# Patient Record
Sex: FEMALE | Employment: OTHER | URBAN - METROPOLITAN AREA
[De-identification: names, ages, dates, MRNs, and addresses within clinical notes are randomized per-mention and may not be internally consistent; named-entity substitution may affect disease eponyms.]

---

## 2018-01-13 ENCOUNTER — HOSPITAL ENCOUNTER (EMERGENCY)
Age: 58
Discharge: HOME | End: 2018-01-13
Attending: EMERGENCY MEDICINE
Payer: SELF-PAY

## 2018-01-13 VITALS
WEIGHT: 293 LBS | RESPIRATION RATE: 17 BRPM | OXYGEN SATURATION: 97 % | SYSTOLIC BLOOD PRESSURE: 141 MMHG | DIASTOLIC BLOOD PRESSURE: 82 MMHG | TEMPERATURE: 98.5 F | HEIGHT: 69 IN | BODY MASS INDEX: 43.4 KG/M2 | HEART RATE: 93 BPM

## 2018-01-13 DIAGNOSIS — J20.9 ACUTE BRONCHITIS, UNSPECIFIED ORGANISM: Primary | ICD-10-CM

## 2018-01-13 LAB
FLUAV AG NPH QL IA: NEGATIVE
FLUBV AG NPH QL IA: NEGATIVE

## 2018-01-13 PROCEDURE — 87804 INFLUENZA ASSAY W/OPTIC: CPT | Performed by: EMERGENCY MEDICINE

## 2018-01-13 PROCEDURE — 99282 EMERGENCY DEPT VISIT SF MDM: CPT | Performed by: EMERGENCY MEDICINE

## 2018-01-13 RX ORDER — AZITHROMYCIN 250 MG/1
TABLET, FILM COATED ORAL
Qty: 6 TAB | Refills: 0 | Status: SHIPPED | OUTPATIENT
Start: 2018-01-13 | End: 2018-01-18

## 2018-01-13 RX ORDER — BENZONATATE 100 MG/1
100 CAPSULE ORAL
Qty: 21 CAP | Refills: 0 | Status: SHIPPED | OUTPATIENT
Start: 2018-01-13 | End: 2018-01-20

## 2018-01-13 NOTE — ED NOTES
I have reviewed discharge instructions with the patient. The patient verbalized understanding. Patient left ED via Discharge Method: ambulatory to Home with (self). Opportunity for questions and clarification provided. Patient given 2 scripts. To continue your aftercare when you leave the hospital, you may receive an automated call from our care team to check in on how you are doing. This is a free service and part of our promise to provide the best care and service to meet your aftercare needs.  If you have questions, or wish to unsubscribe from this service please call [phone]. Thank you for Choosing our [facility] Emergency Department.

## 2018-01-13 NOTE — DISCHARGE INSTRUCTIONS
Bronchitis: Care Instructions  Your Care Instructions    Bronchitis is inflammation of the bronchial tubes, which carry air to the lungs. The tubes swell and produce mucus, or phlegm. The mucus and inflamed bronchial tubes make you cough. You may have trouble breathing. Most cases of bronchitis are caused by viruses like those that cause colds. Antibiotics usually do not help and they may be harmful. Bronchitis usually develops rapidly and lasts about 2 to 3 weeks in otherwise healthy people. Follow-up care is a key part of your treatment and safety. Be sure to make and go to all appointments, and call your doctor if you are having problems. It's also a good idea to know your test results and keep a list of the medicines you take. How can you care for yourself at home? · Take all medicines exactly as prescribed. Call your doctor if you think you are having a problem with your medicine. · Get some extra rest.  · Take an over-the-counter pain medicine, such as acetaminophen (Tylenol), ibuprofen (Advil, Motrin), or naproxen (Aleve) to reduce fever and relieve body aches. Read and follow all instructions on the label. · Do not take two or more pain medicines at the same time unless the doctor told you to. Many pain medicines have acetaminophen, which is Tylenol. Too much acetaminophen (Tylenol) can be harmful. · Take an over-the-counter cough medicine that contains dextromethorphan to help quiet a dry, hacking cough so that you can sleep. Avoid cough medicines that have more than one active ingredient. Read and follow all instructions on the label. · Breathe moist air from a humidifier, hot shower, or sink filled with hot water. The heat and moisture will thin mucus so you can cough it out. · Do not smoke. Smoking can make bronchitis worse. If you need help quitting, talk to your doctor about stop-smoking programs and medicines. These can increase your chances of quitting for good.   When should you call for help? Call 911 anytime you think you may need emergency care. For example, call if:  ? · You have severe trouble breathing. ?Call your doctor now or seek immediate medical care if:  ? · You have new or worse trouble breathing. ? · You cough up dark brown or bloody mucus (sputum). ? · You have a new or higher fever. ? · You have a new rash. ? Watch closely for changes in your health, and be sure to contact your doctor if:  ? · You cough more deeply or more often, especially if you notice more mucus or a change in the color of your mucus. ? · You are not getting better as expected. Where can you learn more? Go to http://fly-pam.info/. Enter H333 in the search box to learn more about \"Bronchitis: Care Instructions. \"  Current as of: May 12, 2017  Content Version: 11.4  © 6134-5208 [employer]. Care instructions adapted under license by [employer] (which disclaims liability or warranty for this information). If you have questions about a medical condition or this instruction, always ask your healthcare professional. [address] any warranty or liability for your use of this information.

## 2018-01-13 NOTE — ED PROVIDER NOTES
HPI Comments: Patient with cough, runny nose, sore throat onset 3 to 4 days ago. Diarrhea onset last night with 3 loose stools. No nausea or vomiting. No fever. No myalgias. Has had headaches. Not a smoker. 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 Patient is a 62 y.o. female presenting with cough. The history is provided by the patient. Cough   This is a new problem. The current episode started more than 2 days ago. The problem has not changed since onset. The cough is productive of brown sputum. There has been no fever. Associated symptoms include chest pain, headaches, rhinorrhea and sore throat. Pertinent negatives include no chills, no sweats, no myalgias, no wheezing, no nausea and no vomiting. She has tried nothing for the symptoms. She is not a smoker. Her past medical history does not include asthma. History reviewed. No pertinent past medical history. Past Surgical History:   Procedure Laterality Date    HX HYSTERECTOMY      HX ORTHOPAEDIC      right hand         History reviewed. No pertinent family history. Social History     Social History    Marital status:      Spouse name: N/A    Number of children: N/A    Years of education: N/A     Occupational History    Not on file. Social History Main Topics    Smoking status: Never Smoker    Smokeless tobacco: Never Used    Alcohol use No    Drug use: No    Sexual activity: Not on file     Other Topics Concern    Not on file     Social History Narrative         ALLERGIES: Tylenol-codeine elixir    Review of Systems   Constitutional: Negative. Negative for chills. HENT: Positive for congestion, rhinorrhea and sore throat. Eyes: Negative. Respiratory: Positive for cough. Negative for wheezing. Cardiovascular: Positive for chest pain. Negative for palpitations and leg swelling. Gastrointestinal: Positive for diarrhea. Negative for abdominal pain, nausea and vomiting.    Genitourinary: Negative. Musculoskeletal: Negative. Negative for myalgias. Skin: Negative. Neurological: Positive for headaches. Hematological: Negative. Psychiatric/Behavioral: Negative. Vitals:    01/13/18 0919   BP: 141/82   Pulse: 93   Resp: 17   Temp: 98.5 °F (36.9 °C)   SpO2: 97%   Weight: 156.5 kg (345 lb)   Height: 5' 9\" (1.753 m)            Physical Exam   Constitutional: She is oriented to person, place, and time. She appears well-developed and well-nourished. HENT:   Head: Normocephalic and atraumatic. Right Ear: External ear normal.   Left Ear: External ear normal.   Mouth/Throat: Oropharynx is clear and moist.   Eyes: Conjunctivae and EOM are normal. Pupils are equal, round, and reactive to light. No scleral icterus. Neck: Normal range of motion. Neck supple. No JVD present. Cardiovascular: Normal rate, regular rhythm, normal heart sounds and intact distal pulses. Pulmonary/Chest: Effort normal and breath sounds normal.   Abdominal: Soft. Bowel sounds are normal. She exhibits no mass. There is no tenderness. Musculoskeletal: Normal range of motion. She exhibits no edema or tenderness. Neurological: She is alert and oriented to person, place, and time. Skin: Skin is warm and dry. Psychiatric: She has a normal mood and affect. Her behavior is normal.   Nursing note and vitals reviewed.        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

## 2019-07-10 ENCOUNTER — HOSPITAL ENCOUNTER (EMERGENCY)
Age: 59
Discharge: HOME | End: 2019-07-10
Attending: EMERGENCY MEDICINE
Payer: COMMERCIAL

## 2019-07-10 VITALS
WEIGHT: 293 LBS | BODY MASS INDEX: 45.99 KG/M2 | SYSTOLIC BLOOD PRESSURE: 131 MMHG | DIASTOLIC BLOOD PRESSURE: 84 MMHG | OXYGEN SATURATION: 100 % | HEIGHT: 67 IN | TEMPERATURE: 98 F | HEART RATE: 89 BPM | RESPIRATION RATE: 16 BRPM

## 2019-07-10 DIAGNOSIS — R42 VERTIGO: Primary | ICD-10-CM

## 2019-07-10 PROCEDURE — 74011250636 HC RX REV CODE- 250/636: Performed by: EMERGENCY MEDICINE

## 2019-07-10 PROCEDURE — 99283 EMERGENCY DEPT VISIT LOW MDM: CPT | Performed by: EMERGENCY MEDICINE

## 2019-07-10 RX ORDER — MECLIZINE HYDROCHLORIDE 25 MG/1
25 TABLET ORAL
Status: COMPLETED | OUTPATIENT
Start: 2019-07-10 | End: 2019-07-10

## 2019-07-10 RX ORDER — MECLIZINE HYDROCHLORIDE 25 MG/1
25 TABLET ORAL
Qty: 25 TAB | Refills: 0 | Status: SHIPPED | OUTPATIENT
Start: 2019-07-10 | End: 2019-07-20

## 2019-07-10 RX ADMIN — MECLIZINE HYDROCHLORIDE 25 MG: 25 TABLET ORAL at 11:13

## 2019-07-10 NOTE — ED TRIAGE NOTES
Pt states she has been feeling dizzy for a few hours ramy when she looks down or moves head. States she does take BP medications and has taken them this morning. Denies headache.

## 2019-07-10 NOTE — ED PROVIDER NOTES
[facility] Emergency Department  Arrival Date/Time: 7/10/2019 10:13 AM      Damian Lopez  MRN: [id number]    YOB: 1960   62 y.o. female    [facility] EMERGENCY DEPT ER01/01  Seen on 7/11/2019 @ 10:47 AM      Today's Chief Complaint:   Chief Complaint   Patient presents with    Hypertension       HPI: 70-year-old female presents to the emergency department with the sudden onset of dizziness. Patient reports feeling like she spinning. Worse when she looks down towards the floor or looks way up. Other position changes also cause some dizziness but not as bad as those    No head injury. Some nausea no vomiting. No chest pain or shortness of breath    No slurred speech fusion. No focal weakness. Not dropping things. She did have some difficulty walking because she felt like she was spinning and falling to the side    She has a history of hypertension. She does take medications. HPI    Review of Systems: Review of Systems   Constitutional: Negative for activity change, appetite change, fatigue and fever. HENT: Negative for rhinorrhea and sore throat. Eyes: Negative for photophobia, pain and redness. Respiratory: Negative for cough, shortness of breath and wheezing. Cardiovascular: Negative for chest pain and palpitations. Gastrointestinal: Positive for vomiting. Negative for abdominal pain and nausea. Genitourinary: Negative for dysuria and frequency. Musculoskeletal: Negative for back pain. Skin: Negative for color change and rash. Neurological: Positive for dizziness. Negative for seizures, syncope, speech difficulty, weakness and headaches. Psychiatric/Behavioral: Negative. Past Medical History: Primary Care Doctor: Other, MD [place]  Meds, PMH, PSHx, SocHx at end of this note     Allergies:    Allergies   Allergen Reactions    Tylenol-Codeine Elixir Nausea and Vomiting         Key Anti-Platelet Anticoagulant Meds     The patient is on no antiplatelet meds or anticoagulants. Physical Exam:  Nursing documentation reviewed. Vitals:    07/10/19 1012 07/10/19 1252   BP: (!) 143/96 131/84   Pulse: 92 89   Resp: 16 16   Temp: 98.1 °F (36.7 °C) 98 °F (36.7 °C)   SpO2: 99% 100%    Vital signs were reviewed. Physical Exam   Constitutional: She is oriented to person, place, and time. She appears well-developed and well-nourished. HENT:   Head: Normocephalic and atraumatic. Eyes: Pupils are equal, round, and reactive to light. EOM are normal.   Cardiovascular: Normal rate and regular rhythm. Pulmonary/Chest: Breath sounds normal. No stridor. No respiratory distress. Abdominal: Soft. She exhibits no distension. There is no tenderness. Musculoskeletal: Normal range of motion. Neurological: She is alert and oriented to person, place, and time. Reassuring HINTS exam  Normal speech   equal     Skin: Skin is warm and dry. Capillary refill takes less than 2 seconds. Psychiatric: She has a normal mood and affect. Her behavior is normal.   Nursing note and vitals reviewed. MEDICAL DECISION MAKING:   Differential Diagnosis:    MDM  Number of Diagnoses or Management Options  Diagnosis management comments: 26-year-old female presents to the emergency department with vertiginous dizziness    Reassuring hints of exam.  Normal neurologic exam    Positive Hallpike maneuver. No fever no chills no slurred speech no confusion no head injury    I don't think imaging of the brain at this time is indicated    I suspect she has benign positional vertigo. I don't suspect a central origin at this time    We'll give her a dose of Antivert monitor for improvement plan discharge home. Data:      Lab findings during this visit (only abnormal values will be noted, if no value noted then the result   was normal range): Labs Reviewed - No data to display     Radiology studies during this visit: No results found.      Medications given in the ED:   Medications meclizine (ANTIVERT) tablet 25 mg (25 mg Oral Given 7/10/19 1113)        Recheck and Additional Documentation (Sepsis, Stroke, Hip):   Patient feeling much better. No slurred speech. No focal weakness. She like to go home. Again I do not think any imaging is indicated at this time. Procedure Documentation: Procedures     Assessment and Plan:      Impression:     ICD-10-CM ICD-9-CM   1. Vertigo R42 780.4        Disposition: Discharged      Follow-up:   Follow-up Information     Follow up With Specialties Details Why [facility] EMERGENCY DEPT Emergency Medicine   [address] [facility]  [phone]           Discharge Medications:   Discharge Medication List as of 7/10/2019 12:33 PM      START taking these medications    Details   meclizine (ANTIVERT) 25 mg tablet Take 1 Tab by mouth three (3) times daily as needed for Dizziness for up to 10 days. , Print, Disp-25 Tab, R-0         CONTINUE these medications which have NOT CHANGED    Details   LEVOTHYROXINE SODIUM (SYNTHROID PO) Take  by mouth., Historical Med              Michael Morelos MD; 07/11/19  10:47 AM      Other ED Course Notes:         Past Medical History:      Past Medical History:   Diagnosis Date    Hypertension      Past Surgical History:   Procedure Laterality Date    HX HYSTERECTOMY      HX ORTHOPAEDIC      right hand     Social History     Tobacco Use    Smoking status: Never Smoker    Smokeless tobacco: Never Used   Substance Use Topics    Alcohol use: No    Drug use: No      Home Medication:   Prior to Admission Medications   Prescriptions Last Dose Informant Patient Reported? Taking? LEVOTHYROXINE SODIUM (SYNTHROID PO)   Yes No   Sig: Take  by mouth.       Facility-Administered Medications: None

## 2019-07-10 NOTE — ED NOTES
I have reviewed discharge instructions with the patient. The patient verbalized understanding. Patient left ED via Discharge Method: ambulatory to Home with self. Opportunity for questions and clarification provided. Patient given 1 scripts. To continue your aftercare when you leave the hospital, you may receive an automated call from our care team to check in on how you are doing. This is a free service and part of our promise to provide the best care and service to meet your aftercare needs.  If you have questions, or wish to unsubscribe from this service please call [phone]. Thank you for Choosing our [facility] Emergency Department.

## 2019-07-10 NOTE — LETTER
[facility] EMERGENCY DEPT 
[address] [place] [id number] [phone] Work/School Note Date: 7/10/2019 To Whom It May concern: 
 
Kamryn Park was seen and treated today in the emergency room by the following provider(s): 
Attending Provider: Li Martin MD. Kamryn Park may return to work on [address], 07- Sincerely,

## 2021-08-20 NOTE — THERAPY EVALUATION
Alie Dominguez  : 1960  Primary: [facility]  Secondary:  [address] at [address]  [address], [address]  [phone]   [phone]           OUTPATIENT PHYSICAL THERAPY:Initial Assessment 2021    ICD-10: Treatment Diagnosis:  Pain in right knee (M25.561)  Pain in left knee (M25.562)  Stiffness of right knee, not elsewhere classified (M25.661)  Stiffness of left knee, not elsewhere classified (M25.662)  Chondromalacia, right knee (M94.261)  Chondromalacia, left knee (X23.868)          Precautions/Allergies:   Patient has no allergy information on record. Fall Risk Score: 0 (? 5 = High Risk)  MD Orders: Eval and Treat  MEDICAL/REFERRING DIAGNOSIS:  Primary osteoarthritis of left knee [M17.12]  Primary osteoarthritis of right knee [M17.11]   DATE OF ONSET: *Chronic  REFERRING PHYSICIAN: Kristyn Bradley MD  RETURN PHYSICIAN APPOINTMENT: February      INITIAL ASSESSMENT:  Ms. Maude Rueda presents to physical therapy with decreased strength, ROM, joint mobility, functional mobility -- she is not a candidate for knee surgery secondary to obesity, but suffers significant B knee pain and ROM deficits. She returns to see MD in February. Patient will benefit from skilled physical therapy for manual therapeutic techniques (as appropriate), therapeutic exercises and activities, balance and comprehensive home exercises program to address current impairments and functional limitations. ROM 10-95 deg B. Will want to transition to aquatics once she demonstrates safety walking in clinic and can get in and out of pool. Alie Dominguez will benefit from skilled PT (medically necessary) in order to address above deficits affecting participation in basic ADLs and overall functional tolerance. PROBLEM LIST (Impacting functional limitations):   1. Decreased Strength  2. Decreased ADL/Functional Activities  3.  Decreased Transfer Abilities  4. Decreased Ambulation Ability/Technique  5. Decreased Balance  6. Increased Pain  7. Decreased Joint mobility/Flexibility   8. Decreased Activity Tolerance  9. Decreased [place] with Home Exercise Program INTERVENTIONS PLANNED:   1. Balance Exercise  2. Bed Mobility  3. Cold  4. Cryotherapy  5. Family Education  6. Gait Training  7. Heat  8. Home Exercise Program (HEP)  9. Manual Therapy  10. Neuromuscular Re-education/Strengthening  11. Range of Motion (ROM)  12. Therapeutic Activites  13. Therapeutic Exercise/Strengthening  14. Transfer Training  15. Ultrasound (US)  16. Vasopneumatic Compression  17. Aquatic Therapy          TREATMENT PLAN:  Effective Dates: 8/23/2021 TO 11/18/2021 (90 days). Frequency/Duration: 2-3 times a week for 90 Days    GOALS: (Goals have been discussed and agreed upon with patient.)   Short-Term Goals~4 weeks  Goal Met   1. David Barillas will be independent with HEP for strength and ROM 1.  [] Date:   2. David Barillas will improve MMT B LE to >=4+/5 to improve current level of independence and community reintegration. 2.  [] Date:   3. David Barillas will demonstrate Bilateral knee flexion >= 100 degrees to improve functional mobility and tolerance of ADLs. 3.  [] Date:   4. David Barillas will be able to stand >5 minutes without rest break in order to improve strength and mobility. 4.  [] Date:   5. David Barillas will be able walk >50 feet with improve balance and strength. 5.  [] Date:   6. Faith Francis to increase lower extremity functional scale by 10 points to show improvement in areas of difficulty 6. [] Date:         Long Term Goals~8 weeks Goal Met   1. David Barillas will be able to go up and down stairs with B railing and good balance. 1.  [] Date:   2. David Barillas will be able to walk >75 feet without rest break and least assistive device. 2.  [] Date:   3.  Julianne Francis to increase lower extremity functional scale by 20 points to show improvement in areas of difficulty  4. [] Date:               Outcome Measure: Tool Used: Lower Extremity Functional Scale (LEFS)    Score:  Initial: 27/80 Most Recent: X/80 (Date: -- )   Interpretation of Score: 20 questions each scored on a 5 point scale with 0 representing \"extreme difficulty or unable to perform\" and 4 representing \"no difficulty\". The lower the score, the greater the functional disability. 80/80 represents no disability. Minimal detectable change is 9 points. Medical Necessity:   · Skilled intervention continues to be required due to current impairment. Reason for Services/Other Comments:  · Patient continues to require skilled intervention due to patient continues to present with impairments assessed at initial evaluation and requiring skilled physical therapy to meet goals for PT. Total Treatment Duration:  PT Patient Time In/Time Out  Time In: 1015  Time Out: 1100      Rehabilitation Potential For Stated Goals: Good  Regarding Julianne Francis's therapy, I certify that the treatment plan above will be carried out by a therapist or under their direction. Thank you for this referral,  Nikhil Latif DPT     Referring Physician Signature: Jolie Rockwell MD              Date                        HISTORY:   History of Present Injury/Illness (Reason for Referral): Jackie Orozco has been dealin with knee pain for a while, but it's been a lot worse the last 4-5 months. She's been on a walker at home for 3 months--she was prescribed this by MD.  She has w/c that she uses when necessary. Overall significantly decreased cardiopulmonary endurance. Currently she is limited with gait and walks only about 10-15 feet max--she states she walks 10 feet a day.   From car to home is typically the only time she walks, except for bathroom--she uses [facility] at night.      -Present Symptoms (on day of evaluation): B knee pain (R>L)      Pain Severity:  · Currently: 5/10  · Best: 3/10  · Worst: 6/10    · Aggravating factors: going up and down stairs, standing, walking, rising after sitting, any weight bearing   · Relieving factors: \"lying around\"  · Irritability: Medium (Onset of pain is equal to alleviation)    Past Medical History/Comorbidities:   Ms. Osvaldo Arndt  has no past medical history on file. Ms. Osvaldo Arndt  has no past surgical history on file. Active Ambulatory Problems     Diagnosis Date Noted    No Active Ambulatory Problems     Resolved Ambulatory Problems     Diagnosis Date Noted    No Resolved Ambulatory Problems     No Additional Past Medical History     Social History/Living Environment:        Social History     Socioeconomic History    Marital status: UNKNOWN     Spouse name: Not on file    Number of children: Not on file    Years of education: Not on file    Highest education level: Not on file   Occupational History    Not on file   Tobacco Use    Smoking status: Not on file   Substance and Sexual Activity    Alcohol use: Not on file    Drug use: Not on file    Sexual activity: Not on file   Other Topics Concern    Not on file   Social History Narrative    Not on file     Social Determinants of Health     Financial Resource Strain:     Difficulty of Paying Living Expenses:    Food Insecurity:     Worried About Running Out of Food in the Last Year:     Owen of Food in the Last Year:    Transportation Needs:     Lack of Transportation (Medical):      Lack of Transportation (Non-Medical):    Physical Activity:     Days of Exercise per Week:     Minutes of Exercise per Session:    Stress:     Feeling of Stress :    Social Connections:     Frequency of Communication with Friends and Family:     Frequency of Social Gatherings with Friends and Family:     Attends Restorationist Services:     Active Member of Clubs or Organizations:     Attends Club or Organization Meetings:     Marital Status:    Intimate Partner Violence:     Fear of Current or Ex-Partner:  Emotionally Abused:     Physically Abused:     Sexually Abused:      Prior Level of Function/Work/Activity:  Has been using a walker and progressively becoming more and more sedentary. Previous Treatment Approach  none  Other Clinical Tests:  X-RAY Positive for oa  Current Medications:  No current outpatient medications on file. Ambulatory/Rehab Services H2 Model Falls Risk Assessment    Risk Factors:       No Risk Factors Identified Ability to Rise from Chair:       (0)  Ability to rise in a single movement    Falls Prevention Plan:       No modifications necessary   Total: (5 or greater = High Risk): 0    ©2010 [employer] [place]. All Rights Reserved. [facility] Patent #[id number]. Federal Law prohibits the replication, distribution or use without written permission from [employer] of [address] [place] [address]         Date Last Reviewed:  8/23/2021   Number of Personal Factors/Comorbidities that affect the Plan of Care: 0: LOW COMPLEXITY   EXAMINATION:   Observation/Orthostatic Postural Assessment:   Gait:  Uses walker and very slow gait, decreased posture and decreased step length L LE, minimal push off B and supination feet.       Palpation:  Assessed @ Initial Visit: Tenderness to *Joint line B (R>;)    AROM/PROM         Joint: Eval Date:  Re-Assess Date:  Re-Assess Date:    Active ROM RIGHT LEFT RIGHT LEFT RIGHT LEFT   Knee Extension 10 10           Knee Flexion 95 95           Hip Flexion             Ankle mobility                                                 Passive ROM         Knee Extension             Knee Flexion               Strength:     Eval Date:  Re-Assess Date:  Re-Assess Date:      RIGHT LEFT RIGHT LEFT RIGHT LEFT   Knee Flexion  4/5  4/5           Knee Extension  4/5  4/5           Hip Flexion  4/5  4/5           Hip Abduction  4/5  4/5           Hip Extension  4-/5  4/5           Ankle Dorsiflexion   5/5 5/5           Ankle PF 5/5 5/5            Special Tests:   Lachman: Negative  Anterior Drawer: Negative  Cyndi: Negative      Neurological Screen:  No radiating symptoms down leg    Functional Mobility:   Sit to Stand=  Squat=  Single Leg Step Down=      Balance and Mobility:  Test Result   Timed up and Go NA Seconds          Body Structures Involved:  1. Bones  2. Joints  3. Muscles  4. Ligaments Body Functions Affected:  1. Sensory/Pain  2. Neuromusculoskeletal  3. Movement Related Activities and Participation Affected:  1. Mobility  2. Self Care   Number of elements that affect the Plan of Care: 4+: HIGH COMPLEXITY   CLINICAL PRESENTATION:   Presentation: Stable and uncomplicated: LOW COMPLEXITY   CLINICAL DECISION MAKING:      Use of outcome tool(s) and clinical judgement create a POC that gives a: Clear prediction of patient's progress: LOW COMPLEXITY   See treatment note for associated treatment provided today.       Future Appointments   Date Time Provider Julio Cesar Lis   2/24/2022 10:15 AM Essie Warner MD [address] [place] [address]         Nicole Sun DPT

## 2021-08-20 NOTE — PROGRESS NOTES
Tonita Lesch  : 1960  Payor: Griffin Cormier / Plan: Jessie Human / Product Type: PPO /  [address]  at [facility]  [address], [address]  [phone]   [phone]                                                            Beth Mathias MD      OUTPATIENT PHYSICAL THERAPY: Daily Treatment Note 2021 Visit Count:  1    Tx Diagnosis:  Pain in right knee (M25.561)  Pain in left knee (M25.562)  Stiffness of right knee, not elsewhere classified (M25.661)  Stiffness of left knee, not elsewhere classified (M25.662)  Chondromalacia, right knee (M94.261)  Chondromalacia, left knee (M94.262)        Pre-treatment Symptoms/Complaints: See Initial Eval Dated 21 for more details. Pain: Initial:4/10  Medications Last Reviewed:  2021     Post Session: 4/10   Updated Objective Findings: See Initial Eval for more details. 10-95 deg B        TREATMENT:   THERAPEUTIC EXERCISE: (24 minutes):  Exercises per grid below to improve mobility, strength and balance. Required minimal visual, verbal and manual cues to promote proper body alignment and promote proper body posture. Progressed resistance and complexity of movement as indicated. Date:  2021 Date:   Date:     Activity/Exercise Parameters Parameters Parameters   Education HEP, POC, PT goals, anatomy/pathology    Talked about walking and getting RW to clinic and transition to pool WHEN safe. LAQ 10X10\"     Bridges 10x10\"     NuStep      HRs      Walking                   MANUAL THERAPY: ( minutes): Joint mobilization, Soft tissue mobilization was utilized and necessary because of the patient's restricted joint motion and restricted motion of soft tissue mobility.         Date  2021    Technique Used Grade  Level # Time(s) Effect while being performed                                                                 HEP Log Date 1.    2021   2.  2021   3. 2021   4.    5. [employer]  Treatment/Session Summary:    Response to Treatment: Pt demonstrated understanding of POC and initial HEP. No increase in pain or adverse reactions. Communication/Consultation:  POC, HEP, PT goals, Faxed initial evaluation to MD.   Equipment provided today: HEP Handout   Recommendations/Intent for next treatment session:   Next visit will focus on knee mobility, strength, functional activities and education. Treatment Plan of Care Effective Dates: 8/23/2021 TO 11/18/2021 (90 days).   Frequency/Duration: 2-3 times a week for 90 Days             Total Treatment Billable Duration:   24  Rx plus Eval   PT Patient Time In/Time Out  Time In: 1015  Time Out: [address] Phoebe Beards, DPT    Future Appointments   Date Time Provider Julio Cesar Hays   2/24/2022 10:15 AM MD THI Walters

## 2021-08-23 ENCOUNTER — HOSPITAL ENCOUNTER (OUTPATIENT)
Dept: PHYSICAL THERAPY | Age: 61
Discharge: HOME | End: 2021-08-23
Payer: COMMERCIAL

## 2021-08-23 DIAGNOSIS — M17.12 PRIMARY OSTEOARTHRITIS OF LEFT KNEE: ICD-10-CM

## 2021-08-23 DIAGNOSIS — M17.11 PRIMARY OSTEOARTHRITIS OF RIGHT KNEE: ICD-10-CM

## 2021-08-23 PROCEDURE — 97161 PT EVAL LOW COMPLEX 20 MIN: CPT

## 2021-08-23 PROCEDURE — 97110 THERAPEUTIC EXERCISES: CPT

## 2021-08-26 ENCOUNTER — HOSPITAL ENCOUNTER (OUTPATIENT)
Dept: PHYSICAL THERAPY | Age: 61
Discharge: HOME | End: 2021-08-26
Payer: COMMERCIAL

## 2021-08-26 PROCEDURE — 97110 THERAPEUTIC EXERCISES: CPT

## 2021-08-26 NOTE — PROGRESS NOTES
Milli Miladys  : 1960  Payor: Patricia Acosta / Plan: Acie Sicard / Product Type: PPO /  [address]  at [address]  [address], [address]  [phone]   [phone]                                                            Sowmya Borges MD      OUTPATIENT PHYSICAL THERAPY: Daily Treatment Note 2021 Visit Count:  2    Tx Diagnosis:  Pain in right knee (M25.561)  Pain in left knee (M25.562)  Stiffness of right knee, not elsewhere classified (M25.661)  Stiffness of left knee, not elsewhere classified (M25.662)  Chondromalacia, right knee (M94.261)  Chondromalacia, left knee (M94.262)        Pre-treatment Symptoms/Complaints: See Initial Eval Dated 21 for more details. Patient reports knee is aching. today   Pain: Initial:4/10  Medications Last Reviewed:  2021     Post Session: 4/10   Updated Objective Findings: See Initial Eval for more details. TREATMENT:   THERAPEUTIC EXERCISE: (45 minutes):  Exercises per grid below to improve mobility, strength and balance. Required minimal visual, verbal and manual cues to promote proper body alignment and promote proper body posture. Progressed resistance and complexity of movement as indicated. Date:  2021 Date:  21 Date:     Activity/Exercise Parameters Parameters Parameters   Education HEP, POC, PT goals, anatomy/pathology    Talked about walking and getting RW to clinic and transition to pool WHEN safe. LAQ 10X10\"     Bridges 10x10\"     NuStep      HRs      Walking   X 25    Sit to stand  2 x 10    SLR  2 x 10    SAQ  2 x 10    QS  2 x 10    Seated bicep curl  # 4 2 x10    Lateral stepping   X 5 (difficult with right leg)          MANUAL THERAPY: ( minutes): Joint mobilization, Soft tissue mobilization was utilized and necessary because of the patient's restricted joint motion and restricted motion of soft tissue mobility.         Date  2021    Technique Used Grade Level # Time(s) Effect while being performed                                                                 HEP Log Date 1.    8/26/2021   2.  8/26/2021   3. 8/26/2021   4.    5.           [employer] Portal  Treatment/Session Summary:    Response to Treatment: Patient demonstrated good effort with all exercises. Needs to work on strength and endurance. Communication/Consultation:  POC, HEP, PT goals, Faxed initial evaluation to MD.   Equipment provided today: HEP Handout   Recommendations/Intent for next treatment session:   Next visit will focus on knee mobility, strength, functional activities and education. Treatment Plan of Care Effective Dates: 8/26/2021 TO 11/18/2021 (90 days).   Frequency/Duration: 2-3 times a week for 90 Days             Total Treatment Billable Duration:   45 3 TE  PT Patient Time In/Time Out  Time In: 0315  Time Out: [address], PTA    Future Appointments   Date Time Provider Julio Cesar Hays   8/31/2021  1:00 PM [facility]   9/2/2021  1:00 PM Mark Edwards DPT [facility]   9/7/2021  3:30 PM Alexander Warner [facility]   9/9/2021  1:00 PM Alexander Warner [facility]   9/14/2021  1:00 PM Alexander Warner [facility]   9/16/2021  1:00 PM Mark Edwards DPT [facility]   9/21/2021  1:00 PM [facility]   9/23/2021  1:00 PM [facility]   2/24/2022 10:15 AM MD THI Milan

## 2021-08-31 ENCOUNTER — HOSPITAL ENCOUNTER (OUTPATIENT)
Dept: PHYSICAL THERAPY | Age: 61
Discharge: HOME | End: 2021-08-31
Payer: COMMERCIAL

## 2021-08-31 NOTE — PROGRESS NOTES
Puja Matt  : 1960  Primary: [facility]  Secondary:  [address] at [facility]  [address], [facility] [address]  [phone]   [phone]      OUTPATIENT DAILY NOTE    NAME/AGE/GENDER: Puja Matt is a 61 y.o. female. DATE: 2021    Ms. Ar Garland for today's appointment due to [facility]. Jarred Lopez, PTA

## 2021-09-02 ENCOUNTER — HOSPITAL ENCOUNTER (OUTPATIENT)
Dept: PHYSICAL THERAPY | Age: 61
Discharge: HOME | End: 2021-09-02
Payer: COMMERCIAL

## 2021-09-02 PROCEDURE — 97110 THERAPEUTIC EXERCISES: CPT

## 2021-09-07 ENCOUNTER — HOSPITAL ENCOUNTER (OUTPATIENT)
Dept: PHYSICAL THERAPY | Age: 61
Discharge: HOME | End: 2021-09-07
Payer: COMMERCIAL

## 2021-09-07 NOTE — PROGRESS NOTES
Stanislaw Dexter  : 1960  Primary: [facility]  Secondary:  [address] at [facility]  [address], [facility] [address]  [phone]   [phone]      OUTPATIENT DAILY NOTE    NAME/AGE/GENDER: Stanislaw Dexter is a 61 y.o. female. DATE: 2021    Ms. Shikha Mendoza for today's appointment due to having a flat tire. Kat Leger, PTA

## 2021-09-14 ENCOUNTER — HOSPITAL ENCOUNTER (OUTPATIENT)
Dept: PHYSICAL THERAPY | Age: 61
Discharge: HOME | End: 2021-09-14
Payer: COMMERCIAL

## 2021-09-14 PROCEDURE — 97110 THERAPEUTIC EXERCISES: CPT

## 2021-09-14 NOTE — PROGRESS NOTES
Therapy Center at [facility]  [address], [address]  [phone]   [phone]    Called Mere Upton at 11:11 AM to remind her of appointment today - she agreed she is coming.  :)

## 2021-09-14 NOTE — PROGRESS NOTES
David Record  : 1960  Payor: Monica Sero / Plan: Diego Cough / Product Type: PPO /  [address]  at [facility]  [address], [address]  [phone]   [phone]                                                            Alejandra Zaragoza MD      OUTPATIENT PHYSICAL THERAPY: Daily Treatment Note 2021 Visit Count:  4    Tx Diagnosis:  Pain in right knee (M25.561)  Pain in left knee (M25.562)  Stiffness of right knee, not elsewhere classified (M25.661)  Stiffness of left knee, not elsewhere classified (M25.662)  Chondromalacia, right knee (M94.261)  Chondromalacia, left knee (M94.262)        Pre-treatment Symptoms/Complaints: See Initial Eval Dated 21 for more details. My knees are really hurting today. Pain: Initial: 610  Medications Last Reviewed:  2021     Post Session: 4/10   Updated Objective Findings: See Initial Eval for more details. TREATMENT:   THERAPEUTIC EXERCISE: (54 minutes):  Exercises per grid below to improve mobility, strength and balance. Required minimal visual, verbal and manual cues to promote proper body alignment and promote proper body posture. Progressed resistance and complexity of movement as indicated. Date:  2021 Date:  21 Date:  21 Date     Activity/Exercise Parameters Parameters Parameters Parameters   Education HEP, POC, PT goals, anatomy/pathology    Talked about walking and getting RW to clinic and transition to pool WHEN safe. LAQ 10X10\"  10x10\" 10 x 10   Bridges 10x10\"  10x10\" 10 x 10   NuStep   7  Minutes, no resistance.   X 10 min    HRs   Standing 10x to walking x 2 Standing 10x to walking x 2   Walking   X 25 50'x4    Sit to stand  2 x 10 2x10 2 x 10   SLR  2 x 10  2 x 10   SAQ  2 x 10  2 x 10   QS  2 x 10 2x10  2 x 10   Seated bicep curl  # 4 2 x10 # 4 2 x10    Lateral stepping   X 5 (difficult with right leg)     Seated marching   2x10 2 x 10   HRs   2x10 MANUAL THERAPY: ( minutes): Joint mobilization, Soft tissue mobilization was utilized and necessary because of the patient's restricted joint motion and restricted motion of soft tissue mobility. Date  9/14/2021    Technique Used Grade  Level # Time(s) Effect while being performed                                                                 HEP Log Date 1.    9/14/2021   2.  9/14/2021   3. 9/14/2021   4.    5.           [employer] Portal  Treatment/Session Summary:    Response to Treatment: Patient tolerated treatment with mild discomfort. Patient needs to continue to do home exercises at least once a day. Communication/Consultation:  POC, HEP, PT goals, Faxed initial evaluation to MD.   Equipment provided today: HEP Handout   Recommendations/Intent for next treatment session:   Next visit will focus on knee mobility, strength, functional activities and education. Treatment Plan of Care Effective Dates: 9/14/2021 TO 11/18/2021 (90 days).   Frequency/Duration: 2-3 times a week for 90 Days             Total Treatment Billable Duration:   45 3 TE  PT Patient Time In/Time Out  Time In: 0100  Time Out: DANIELA German    Future Appointments   Date Time Provider Julio Cesar Hays   9/16/2021  1:00 PM OSIRIS English [facility]   9/21/2021  1:00 PM Russ [facility]   9/23/2021  1:00 PM Luisa Coats DPT [facility]RIST[facility]   2/24/2022 10:15 AM MD THI Duron

## 2021-09-16 ENCOUNTER — HOSPITAL ENCOUNTER (OUTPATIENT)
Dept: PHYSICAL THERAPY | Age: 61
Discharge: HOME | End: 2021-09-16
Payer: COMMERCIAL

## 2021-09-16 PROCEDURE — 97110 THERAPEUTIC EXERCISES: CPT

## 2021-09-16 NOTE — PROGRESS NOTES
Viridiana Falcon  : 1960  Payor: Nilepeggy Chelita / Plan: Danny Girt / Product Type: PPO /  [address]  at [facility]  [address], [address]  [phone]   [phone]                                                            Adwoa Galeana MD      OUTPATIENT PHYSICAL THERAPY: Daily Treatment Note 2021 Visit Count:  5    Tx Diagnosis:  Pain in right knee (M25.561)  Pain in left knee (M25.562)  Stiffness of right knee, not elsewhere classified (M25.661)  Stiffness of left knee, not elsewhere classified (M25.662)  Chondromalacia, right knee (M94.261)  Chondromalacia, left knee (M94.262)        Pre-treatment Symptoms/Complaints: See Initial Eval Dated 21 for more details. Knees are a bit sore today. Pain: Initial: 610  Medications Last Reviewed:  2021     Post Session: 4/10   Updated Objective Findings: See Initial Eval for more details. TREATMENT:   THERAPEUTIC EXERCISE: (54 minutes):  Exercises per grid below to improve mobility, strength and balance. Required minimal visual, verbal and manual cues to promote proper body alignment and promote proper body posture. Progressed resistance and complexity of movement as indicated. Date:  2021 Date:  21 Date:  21 Date   Date:  21   Activity/Exercise Parameters Parameters Parameters Parameters    Education HEP, POC, PT goals, anatomy/pathology    Talked about walking and getting RW to clinic and transition to pool WHEN safe. LAQ 10X10\"  10x10\" 10 x 10 10x10\"   Bridges 10x10\"  10x10\" 10 x 10 10x10   NuStep   7  Minutes, no resistance.   X 10 min  x10 min   HRs   Standing 10x to walking x 2 Standing 10x to walking x 2 Standing 10x to walking x 2   Walking   X 25 50'x4     Sit to stand  2 x 10 2x10 2 x 10 2x10   SLR  2 x 10  2 x 10 2x10   SAQ  2 x 10  2 x 10 2x10   QS  2 x 10 2x10  2 x 10 10x10\"   Seated bicep curl  # 4 2 x10 # 4 2 x10     Lateral stepping X 5 (difficult with right leg)      Seated marching   2x10 2 x 10 2x10   HRs   2x10           MANUAL THERAPY: ( minutes): Joint mobilization, Soft tissue mobilization was utilized and necessary because of the patient's restricted joint motion and restricted motion of soft tissue mobility. Date  9/16/2021    Technique Used Grade  Level # Time(s) Effect while being performed                                                                 HEP Log Date 1.    9/16/2021   2.  9/16/2021   3. 9/16/2021   4.    5.           [employer] Portal  Treatment/Session Summary:    Response to Treatment: Patient agreed to do home exercises. Slight discomfort on NuStep. Communication/Consultation:  POC, HEP, PT goals, Faxed initial evaluation to MD.   Equipment provided today: HEP Handout   Recommendations/Intent for next treatment session:   Next visit will focus on knee mobility, strength, functional activities and education. Treatment Plan of Care Effective Dates: 9/16/2021 TO 11/18/2021 (90 days).   Frequency/Duration: 2-3 times a week for 90 Days             Total Treatment Billable Duration:   47'  4TE  PT Patient Time In/Time Out  Time In: 1300  Time Out: [facility] Gino Dennison DPT     Future Appointments   Date Time Provider Julio Cesar Hays   9/21/2021  1:00 PM [facility]   9/23/2021  1:00 PM Gregory Vera DPT [facility]   2/24/2022 10:15 AM MD THI Kumar

## 2021-09-21 ENCOUNTER — HOSPITAL ENCOUNTER (OUTPATIENT)
Dept: PHYSICAL THERAPY | Age: 61
Discharge: HOME | End: 2021-09-21
Payer: COMMERCIAL

## 2021-09-21 PROCEDURE — 97110 THERAPEUTIC EXERCISES: CPT

## 2021-09-21 NOTE — PROGRESS NOTES
Damon Katrina  : 1960  Payor: Jonothan Fleischer / Plan: Sarah Dose / Product Type: PPO /  [address]  at [facility]  [address], [address]  [phone]   [phone]                                                            Nuha Todd MD      OUTPATIENT PHYSICAL THERAPY: Daily Treatment Note 2021 Visit Count:  6    Tx Diagnosis:  Pain in right knee (M25.561)  Pain in left knee (M25.562)  Stiffness of right knee, not elsewhere classified (M25.661)  Stiffness of left knee, not elsewhere classified (M25.662)  Chondromalacia, right knee (M94.261)  Chondromalacia, left knee (M94.262)        Pre-treatment Symptoms/Complaints: See Initial Eval Dated 21 for more details. Knees have been hurting more   Pain: Initial: 610  Medications Last Reviewed:  2021     Post Session: 4/10   Updated Objective Findings: See Initial Eval for more details. TREATMENT:   THERAPEUTIC EXERCISE: (45minutes):  Exercises per grid below to improve mobility, strength and balance. Required minimal visual, verbal and manual cues to promote proper body alignment and promote proper body posture. Progressed resistance and complexity of movement as indicated. Date:  2021 Date   Date:  21 Date  21   Activity/Exercise Parameters Parameters     Education HEP, POC, PT goals, anatomy/pathology    Talked about walking and getting RW to clinic and transition to pool WHEN safe.       LAQ 10X10\" 10 x 10 10x10\" 10 x10   Bridges 10x10\" 10 x 10 10x10    NuStep  X 10 min  x10 min X 11 min   HRs  Standing 10x to walking x 2 Standing 10x to walking x 2    Walking        Sit to stand  2 x 10 2x10 2 x 10   SLR  2 x 10 2x10 2 x 10   SAQ  2 x 10 2x10    QS  2 x 10 10x10\"    Seated bicep curl       Lateral stepping        Seated marching  2 x 10 2x10    HRs       Standing marching     10 15  20   Standing with weights    Bicep curls # 2 x 20   Standing shoulder press     #2 x 2 min         MANUAL THERAPY: ( minutes): Joint mobilization, Soft tissue mobilization was utilized and necessary because of the patient's restricted joint motion and restricted motion of soft tissue mobility. Date  9/21/2021    Technique Used Grade  Level # Time(s) Effect while being performed                                                                 HEP Log Date 1.    9/21/2021   2.  9/21/2021   3. 9/21/2021   4.    5.           [employer] Portal  Treatment/Session Summary:    Response to Treatment: Patient tolerated treatment better today. Low endurance and exercise tolerance. Reviewed home exercises for clear understanding. Communication/Consultation:  POC, HEP, PT goals, Faxed initial evaluation to MD.   Equipment provided today: HEP Handout   Recommendations/Intent for next treatment session:   Next visit will focus on knee mobility, strength, functional activities and education. Treatment Plan of Care Effective Dates: 9/21/2021 TO 11/18/2021 (90 days).   Frequency/Duration: 2-3 times a week for 90 Days             Total Treatment Billable Duration:  3 TE  PT Patient Time In/Time Out  Time In: 0100  Time Out: DANIELA German    Future Appointments   Date Time Provider Julio Cesar Hays   9/23/2021  1:00 PM Keisha Saunders DPT [facility]   2/24/2022 10:15 AM MD THI Rothman

## 2021-09-23 ENCOUNTER — HOSPITAL ENCOUNTER (OUTPATIENT)
Dept: PHYSICAL THERAPY | Age: 61
Discharge: HOME | End: 2021-09-23
Payer: COMMERCIAL

## 2021-09-23 PROCEDURE — 97110 THERAPEUTIC EXERCISES: CPT

## 2021-09-23 NOTE — PROGRESS NOTES
Delia Cano  : 1960  Payor: Dav Delacruz / Plan: Adonis Goodrich / Product Type: PPO /  [address]  at [facility] Therapy  [address], [address]  [phone]   [phone]                                                            Amena Mckeon MD      OUTPATIENT PHYSICAL THERAPY: Daily Treatment Note 2021 Visit Count:  7    Tx Diagnosis:  Pain in right knee (M25.561)  Pain in left knee (M25.562)  Stiffness of right knee, not elsewhere classified (M25.661)  Stiffness of left knee, not elsewhere classified (M25.662)  Chondromalacia, right knee (M94.261)  Chondromalacia, left knee (M94.262)        Pre-treatment Symptoms/Complaints: See Initial Eval Dated 21 for more details. I am doing okay today---marco a hurts in the back (of knee (left)) when I sit down   Pain: Initial: 610  Medications Last Reviewed:  2021     Post Session: 4/10   Updated Objective Findings: See Initial Eval for more details. TREATMENT:   THERAPEUTIC EXERCISE: (54 minutes):  Exercises per grid below to improve mobility, strength and balance. Required minimal visual, verbal and manual cues to promote proper body alignment and promote proper body posture. Progressed resistance and complexity of movement as indicated. Date:  2021 Date   Date:  21 Date  21 Date:     Activity/Exercise Parameters Parameters      Education HEP, POC, PT goals, anatomy/pathology    Talked about walking and getting RW to clinic and transition to pool WHEN safe.        LAQ 10X10\" 10 x 10 10x10\" 10 x10 10x10\"   Bridges 10x10\" 10 x 10 10x10     NuStep  X 10 min  x10 min X 10 min  85 SPM X 10 min  85 SPM   HRs  Standing 10x to walking x 2 Standing 10x to walking x 2     Walking         Sit to stand  2 x 10 2x10 2 x 10 2x10   SLR  2 x 10 2x10 2 x 10    SAQ  2 x 10 2x10     QS  2 x 10 10x10\"     Seated bicep curl        Lateral stepping         Seated marching  2 x 10 2x10 HRs        Standing marching     10 15  20 *Seated today due to pain. Standing with weights    Bicep curls # 2 x 20    Standing shoulder press     #2 x 2 min 4# ~2 minutes lateral and OH. MANUAL THERAPY: ( minutes): Joint mobilization, Soft tissue mobilization was utilized and necessary because of the patient's restricted joint motion and restricted motion of soft tissue mobility. Date  9/23/2021    Technique Used Grade  Level # Time(s) Effect while being performed                                                                 HEP Log Date 1.    9/23/2021   2.  9/23/2021   3. 9/23/2021   4.    5.           [employer] Portal  Treatment/Session Summary:    Response to Treatment: Slight discomfort with eccentric strengthening - sit. Overall progressing, but does need encouragement. Recommending increased intensity on bike as well as challenge to keep steps at or above specified level throughout. Communication/Consultation:  POC, HEP, PT goals, Faxed initial evaluation to MD.   Equipment provided today: HEP Handout   Recommendations/Intent for next treatment session:   Next visit will focus on knee mobility, strength, functional activities and education. Treatment Plan of Care Effective Dates: 9/23/2021 TO 11/18/2021 (90 days).   Frequency/Duration: 2-3 times a week for 90 Days             Total Treatment Billable Duration: 47 Min   PT Patient Time In/Time Out  Time In: 1300  Time Out: [facility] Mona Ken DPT     Future Appointments   Date Time Provider Julio Cesar Hays   2/24/2022 10:15 AM MD THI Lance

## 2021-09-27 ENCOUNTER — APPOINTMENT (OUTPATIENT)
Dept: PHYSICAL THERAPY | Age: 61
End: 2021-09-27
Payer: COMMERCIAL

## 2021-10-07 ENCOUNTER — HOSPITAL ENCOUNTER (OUTPATIENT)
Dept: PHYSICAL THERAPY | Age: 61
Discharge: HOME | End: 2021-10-07
Payer: COMMERCIAL
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
## 2021-10-19 ENCOUNTER — HOSPITAL ENCOUNTER (OUTPATIENT)
Dept: PHYSICAL THERAPY | Age: 61
Discharge: HOME | End: 2021-10-19
Payer: COMMERCIAL

## 2021-10-19 PROCEDURE — 97110 THERAPEUTIC EXERCISES: CPT

## 2021-10-19 NOTE — PROGRESS NOTES
Catherne Lefort  : 1960  Payor: Fatou Solitario / Plan: Christophe Sethi / Product Type: PPO /  [address]  at [facility] Therapy  [address], [address]  [phone]   [phone]                                                            Radha Berry MD      OUTPATIENT PHYSICAL THERAPY: Daily Treatment Note 10/19/2021 Visit Count:  8    Tx Diagnosis:  Pain in right knee (M25.561)  Pain in left knee (M25.562)  Stiffness of right knee, not elsewhere classified (M25.661)  Stiffness of left knee, not elsewhere classified (M25.662)  Chondromalacia, right knee (M94.261)  Chondromalacia, left knee (M94.262)        Pre-treatment Symptoms/Complaints: See Initial Eval Dated 21 for more details. Patient reports that her knees have been so painful for the past week she hasn't done much walking because she feels like she might fall. Pain: Initial: 610  Medications Last Reviewed:  10/19/2021     Post Session: 4/10   Updated Objective Findings: See Initial Eval for more details. TREATMENT:   THERAPEUTIC EXERCISE: (45 minutes):  Exercises per grid below to improve mobility, strength and balance. Required minimal visual, verbal and manual cues to promote proper body alignment and promote proper body posture. Progressed resistance and complexity of movement as indicated. Date:  21 Date  21 Date:   Date  10/19   Activity/Exercise       Education       LAQ 10x10\" 10 x10 10x10\" 10 x 10   Bridges 10x10      NuStep x10 min X 10 min  85 SPM X 10 min  85 SPM X 10 min  85 SPM   HRs Standing 10x to walking x 2      Walking        Sit to stand 2x10 2 x 10 2x10 2 x 10   SLR 2x10 2 x 10  2 x 10   SAQ 2x10      QS 10x10\"      Seated bicep curl       Lateral stepping     2 x 10   Seated marching 2x10      HRs       Standing marching   10 15  20 *Seated today due to pain.  5 10 15   Standing with weights  Bicep curls # 2 x 20     Standing shoulder press   #2 x 2 min 4# ~2 minutes lateral and OH. Standing heel toe raises    2 x 10                MANUAL THERAPY: ( minutes): Joint mobilization, Soft tissue mobilization was utilized and necessary because of the patient's restricted joint motion and restricted motion of soft tissue mobility. Date  10/19/2021    Technique Used Grade  Level # Time(s) Effect while being performed                                                                 HEP Log Date 1.    10/19/2021   2.  10/19/2021   3. 10/19/2021   4.    5.           [employer] Portal  Treatment/Session Summary:    Response to Treatment: Patient tolerated treatment with moderate discomfort. Patient indicated that most things hurt her knees now. Discussed with acting PT and patient that the pool might be the best option for her at this time. Patient plans to do pool exercises for 1 month before returning to the pool. Communication/Consultation:  POC, HEP, PT goals, Faxed initial evaluation to MD.   Equipment provided today: HEP Handout   Recommendations/Intent for next treatment session:   Next visit will focus on knee mobility, strength, functional activities and education. Treatment Plan of Care Effective Dates: 10/19/2021 TO 11/18/2021 (90 days).   Frequency/Duration: 2-3 times a week for 90 Days             Total Treatment Billable Duration: 39 Min   PT Patient Time In/Time Out  Time In: 0100  Time Out: [facility]    Future Appointments   Date Time Provider Julio Cesar Hays   10/22/2021 12:30 PM [facility]   11/2/2021  4:30 PM DANIS Fontanez [facility]   11/4/2021  4:30 PM DANIS Fontanez [facility]   2/24/2022 10:15 AM MD THI Almonte

## 2021-10-22 ENCOUNTER — HOSPITAL ENCOUNTER (OUTPATIENT)
Dept: PHYSICAL THERAPY | Age: 61
Discharge: HOME | End: 2021-10-22
Payer: COMMERCIAL
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
## 2021-11-17 ENCOUNTER — HOSPITAL ENCOUNTER (OUTPATIENT)
Dept: PHYSICAL THERAPY | Age: 61
Discharge: HOME | End: 2021-11-17

## 2021-11-17 PROCEDURE — 97113 AQUATIC THERAPY/EXERCISES: CPT

## 2021-11-17 PROCEDURE — 97110 THERAPEUTIC EXERCISES: CPT

## 2021-11-17 NOTE — PROGRESS NOTES
Jennifer Yadav  : 1960  Payor: Meg Rajan / Plan: Elizabet Lucio / Product Type: PPO /  [address]  at [facility] HENRY BAEZ  [address] [facility], [address], [place] [address].  [phone]   [phone]       OUTPATIENT PHYSICAL THERAPY: Daily Treatment Note 2021  Visit Count: 9     ICD-10: Treatment Diagnosis: ICD-10: Treatment Diagnosis:  Pain in right knee (M25.561)  Pain in left knee (M25.562)  Stiffness of right knee, not elsewhere classified (M25.661)  Stiffness of left knee, not elsewhere classified (M25.662)  Chondromalacia, right knee (M94.261)  Chondromalacia, left knee (H06.284)     Precautions/Allergies: Patient has no allergy information on record. TREATMENT PLAN:   Effective Dates: 2021 TO 2021 (90 days). Frequency/Duration: 2-3 times a week for 90 Days   MEDICAL/REFERRING DIAGNOSIS:  Unilateral primary osteoarthritis, left knee [M17.12]  Unilateral primary osteoarthritis, right knee [M17.11]    DATE OF ONSET: chronic  REFERRING PHYSICIAN: Jorgito Hernandez MD MD Orders: Casimiro Gibson and treat  Return MD Appointment: 2022       Pre-treatment Symptoms/Complaints:  Patient states \"The R knee is really bothering me. \"  Pain: Initial:    6/10 R knee greater than L Post Session:  Pt reported less pain in the pool but she reported increased pain ambulating on land with rolling walker. Medications Last Reviewed:  21    Updated Objective Findings:   Pt ambulated with a rolling walker with an antalgic gait and decreased B heel strike. She leans forward on the walker and must take frequent rest breaks due to knee pain and fatigue. TREATMENT:     Therapeutic Exercise: ( 15 min):  Exercises per grid below to improve mobility and strength. Required moderate visual, verbal and manual cues to complete tasks. Progressed complexity of movement as indicated. Patient  ambulated from her car to the main lobby of the clinic on uneven surfaces independently.  She took a rest break and called the therapist to help her. She required min assist to transfer from sitting to standing. She ambulated 30 ft with the rolling walker and took one rest break. She required mod assist x2 to transfer from sitting to standing. Then she ambulated 20 ft into the pool area. She transferred from standing to the pool transfer chair with verbal cues for correct sequencing of steps. Pt required verbal cues and min assist x2 in the pool to transfer from the chair <> pool. After aquatics she transferred from sitting to standing using the RW for support and SBA. She ambulated 30 feet with the rolling walker with CGA and took a rest break in the waiting room. She requested a wheel chair due to knee pain. She transferred independently from the wheel chair to standing and she was able to transfer into the car by holding onto the car door. Aquatic Therapy (30 minutes): Aquatic treatment performed per flow grid for Decreased muscle strength and Decreased activity endurance. Cues provided for correct body mechanics. Assistance by therapist provided for transferring into the pool with the transfer chair. Min A x2 and pt required constant verbal cues for correct sequencing of steps to transfer from pool chair to pool. Patient has difficulty with activity tolerance due to pain. The therapist held her hand during gait activities due to fear of water. Therapist was in the pool with the pt.      Date:  11/17/21 Date:   Date:     Aquatic exercise Parameters in 3.5 ft water Parameters Parameters   Forward gait with hand held assist and UE on bar 6 lengths     Lateral walk with UE support on bar 6 lengths     Backwards gait with hand held assist and one UE on bar 6 lengths     LAQ x10 B on pool bench     Heel raises at bar x10      Standing hip extension at bar x10 B with verbal and tactile cues for hip extension               HEP: continue current HEP  [employer] Portal    Treatment/Session Summary:    · Response to Treatment: Pt had much less pain ambulating in the pool. She is limited by knee pain and requires constant verbal cues for correct body and gait mechanics. She puts forth good effort during aquatics and gait training but the knee pain limits her independence and ability to ambulate greater than 30 ft. See above for details. · Communication/Consultation:  None today  · Equipment provided today:  None today  · Recommendations/Intent for next treatment session: Next visit will focus on continuing aquatic PT.     Total Treatment Billable Duration:  45 minutes  PT Patient Time In/Time Out  Time In: 1030  Time Out: 1115    Mikal Celaya PTA    Future Appointments   Date Time Provider [facility]   11/18/2021  2:30 PM [facility]   2/24/2022 10:15 AM MD THI Cotto

## 2021-11-18 ENCOUNTER — HOSPITAL ENCOUNTER (OUTPATIENT)
Dept: PHYSICAL THERAPY | Age: 61
Discharge: HOME | End: 2021-11-18

## 2021-11-18 PROCEDURE — 97113 AQUATIC THERAPY/EXERCISES: CPT

## 2021-11-18 NOTE — PROGRESS NOTES
Judith Amarjit  : 1960  Payor: Oswaldo Herr / Plan: Nobie Corns / Product Type: PPO /  [address] [place]  at [facility] HENRY BAEZ  [address] [place] [address], [address], [place] [address].  [phone]   [phone]       OUTPATIENT PHYSICAL THERAPY: Daily Treatment Note 2021  Visit Count: 10     ICD-10: Treatment Diagnosis: ICD-10: Treatment Diagnosis:  Pain in right knee (M25.561)  Pain in left knee (M25.562)  Stiffness of right knee, not elsewhere classified (M25.661)  Stiffness of left knee, not elsewhere classified (M25.662)  Chondromalacia, right knee (M94.261)  Chondromalacia, left knee (M31.629)     Precautions/Allergies: Patient has no allergy information on record. TREATMENT PLAN:   Effective Dates: 2021 TO 2021 (90 days). Frequency/Duration: 2-3 times a week for 90 Days   MEDICAL/REFERRING DIAGNOSIS:  Unilateral primary osteoarthritis, left knee [M17.12]  Unilateral primary osteoarthritis, right knee [M17.11]    DATE OF ONSET: chronic  REFERRING PHYSICIAN: Joe Ghosh MD MD Orders: Lurlene Najjar and treat  Return MD Appointment: 2022       Pre-treatment Symptoms/Complaints:  Patient states her knee is more sore today. She didn't notice the pain while in the pool yesterday, but felt it afterward. Pain: Initial: Pain Intensity 1: 3    Post Session:  Pt reported no pain when she first got out of pool, but expected it to be sore later. Medications Last Reviewed:  21    Updated Objective Findings:   Patient came into clinic propelling herself in wheel chair     TREATMENT:     Therapeutic Exercise: (   ):   Patient transferred from wheelchair to pool lift chair (and back) with SBA. Aquatic Therapy (30 minutes): Aquatic treatment performed per flow grid for Decreased muscle strength and Decreased activity endurance. Cues provided for correct body mechanics. Transfer chair used to get in and out of pool.   Patient was able to get in pool by herself without therapist in the water with her. Date:  11/17/21 Date:  11/18/21 Date:     Aquatic exercise Parameters in 3.5 ft water Parameters Parameters   Forward gait with hand held assist and UE on bar 6 lengths 4 laps    Lateral walk with UE support on bar 6 lengths 3 laps +   2 laps    Backwards gait with hand held assist and one UE on bar 6 lengths     LAQ x10 B on pool bench X ~3 min, x ~3 min    Heel raises at bar x10  -    Standing hip extension at bar x10 B with verbal and tactile cues for hip extension -              HEP: continue current HEP  [employer] Portal    Treatment/Session Summary:    · Response to Treatment: Patient had more pain in knees today since this was her second day in a row. More frequent rests sitting on bench in pool between activities. · Communication/Consultation:  None today  · Equipment provided today:  None today  · Recommendations/Intent for next treatment session: Next visit will focus on continuing aquatic PT. Will try to schedule PT to avoid consecutive days.      Total Treatment Billable Duration:  30 minutes  PT Patient Time In/Time Out  Time In: 8998  Time Out: [address] Laquetta Kehr, PT    Future Appointments   Date Time Provider Julio Cesar Hays   11/22/2021 11:15 AM [place] [place] [facility]   2/24/2022 10:15 AM MD THI Montes

## 2021-11-22 ENCOUNTER — HOSPITAL ENCOUNTER (OUTPATIENT)
Dept: PHYSICAL THERAPY | Age: 61
Discharge: HOME | End: 2021-11-22

## 2022-05-17 NOTE — PROGRESS NOTES
Procedure confirmed with patient. Aware of where and when to arrive and  policy. Covid screening completed. No questions at this time.

## 2022-05-18 ENCOUNTER — ANESTHESIA (OUTPATIENT)
Dept: ENDOSCOPY | Age: 62
End: 2022-05-18
Payer: COMMERCIAL

## 2022-05-18 ENCOUNTER — ANESTHESIA EVENT (OUTPATIENT)
Dept: ENDOSCOPY | Age: 62
End: 2022-05-18
Payer: COMMERCIAL

## 2022-05-18 ENCOUNTER — HOSPITAL ENCOUNTER (OUTPATIENT)
Age: 62
Setting detail: OUTPATIENT SURGERY
Discharge: HOME | End: 2022-05-18
Attending: INTERNAL MEDICINE | Admitting: INTERNAL MEDICINE
Payer: COMMERCIAL

## 2022-05-18 VITALS
SYSTOLIC BLOOD PRESSURE: 130 MMHG | WEIGHT: 293 LBS | TEMPERATURE: 96.8 F | DIASTOLIC BLOOD PRESSURE: 74 MMHG | BODY MASS INDEX: 44.41 KG/M2 | HEART RATE: 82 BPM | RESPIRATION RATE: 16 BRPM | OXYGEN SATURATION: 100 % | HEIGHT: 68 IN

## 2022-05-18 PROCEDURE — 74011250636 HC RX REV CODE- 250/636: Performed by: NURSE ANESTHETIST, CERTIFIED REGISTERED

## 2022-05-18 PROCEDURE — 76060000031 HC ANESTHESIA FIRST 0.5 HR: Performed by: INTERNAL MEDICINE

## 2022-05-18 PROCEDURE — 76040000025: Performed by: INTERNAL MEDICINE

## 2022-05-18 PROCEDURE — 74011000250 HC RX REV CODE- 250: Performed by: NURSE ANESTHETIST, CERTIFIED REGISTERED

## 2022-05-18 PROCEDURE — 2709999900 HC NON-CHARGEABLE SUPPLY: Performed by: INTERNAL MEDICINE

## 2022-05-18 RX ORDER — LIDOCAINE HYDROCHLORIDE 20 MG/ML
INJECTION, SOLUTION EPIDURAL; INFILTRATION; INTRACAUDAL; PERINEURAL AS NEEDED
Status: DISCONTINUED | OUTPATIENT
Start: 2022-05-18 | End: 2022-05-18 | Stop reason: HOSPADM

## 2022-05-18 RX ORDER — PROPOFOL 10 MG/ML
INJECTION, EMULSION INTRAVENOUS AS NEEDED
Status: DISCONTINUED | OUTPATIENT
Start: 2022-05-18 | End: 2022-05-18 | Stop reason: HOSPADM

## 2022-05-18 RX ORDER — DEXTROMETHORPHAN HYDROBROMIDE, GUAIFENESIN 5; 100 MG/5ML; MG/5ML
650 LIQUID ORAL EVERY 8 HOURS
COMMUNITY

## 2022-05-18 RX ORDER — SODIUM CHLORIDE, SODIUM LACTATE, POTASSIUM CHLORIDE, CALCIUM CHLORIDE 600; 310; 30; 20 MG/100ML; MG/100ML; MG/100ML; MG/100ML
INJECTION, SOLUTION INTRAVENOUS
Status: DISCONTINUED | OUTPATIENT
Start: 2022-05-18 | End: 2022-05-18 | Stop reason: HOSPADM

## 2022-05-18 RX ADMIN — LIDOCAINE HYDROCHLORIDE 40 MG: 20 INJECTION, SOLUTION EPIDURAL; INFILTRATION; INTRACAUDAL; PERINEURAL at 12:40

## 2022-05-18 RX ADMIN — PROPOFOL 140 MCG/KG/MIN: 10 INJECTION, EMULSION INTRAVENOUS at 12:41

## 2022-05-18 RX ADMIN — PROPOFOL 40 MG: 10 INJECTION, EMULSION INTRAVENOUS at 12:50

## 2022-05-18 RX ADMIN — PROPOFOL 50 MG: 10 INJECTION, EMULSION INTRAVENOUS at 12:40

## 2022-05-18 RX ADMIN — SODIUM CHLORIDE, SODIUM LACTATE, POTASSIUM CHLORIDE, AND CALCIUM CHLORIDE: 600; 310; 30; 20 INJECTION, SOLUTION INTRAVENOUS at 12:36

## 2022-05-18 NOTE — DISCHARGE INSTRUCTIONS
Gastrointestinal Esophagogastroduodenoscopy (EGD) - Upper Exam Discharge Instructions    1. Call Dr. Corry Conley at [phone] for any problems or questions. 2. Contact the doctor's office for follow up appointment as directed. 3. Medication may cause drowsiness for several hours, therefore:  · Do not drive or operate machinery for remainder of the day. · No alcohol today. · Do not make any important or legal decisions for 24 hours. · Do not sign any legal documents for 24 hours. 5. Ordinarily, you may resume regular diet and activity after exam unless otherwise specified by your physician. 6. For mild soreness in your throat you may use Cepacol throat lozenges or warm salt-water gargles as needed. Any additional instructions:  1. Start taking Omeprazole (Prilosec) 40mg twice daily. Buy over the counter. 2. Call office to reschedule colonoscopy and for a return appointment in 1-2 months. Instructions given to Jonathan Nguyen and other family member.

## 2022-05-18 NOTE — PROCEDURES
Colonoscopy Procedure Note    Procedure: Colonoscopy    Pre-operative Diagnosis: Screening for CRC     Post-operative Diagnosis: Incomplete colonoscopy because of poor bowel prep   Procedure to the sigmoid colon. Recommendations: The patient was advised not to drive today nor to make any important decisions. They may resume normal diet and medications unless otherwise instructed in recovery. Surveillance interval: She will need to reschedule colonoscopy with a two-day bowel prep. Anesthesia/Sedation:  MAC,, (see separate report). Procedure Details:  Informed consent was obtained for the procedure, including anesthesia. Risks of perforation, hemorrhage, adverse drug reaction and aspiration were discussed. The patient was placed in the left lateral decubitus position. Based on the pre-procedure assessment, including review of the patient's medical history, medications, allergies, and review of systems, she had been deemed to be an appropriate candidate for this procedure. A rectal examination was performed. The colonoscope was inserted into the rectum and advanced under direct vision to the sigmoid where solid stool necessitated stopping. A careful inspection was made as the colonoscope was withdrawn; findings and interventions are described below. Findings:   TERMINAL ILEUM: The terminal ileum was not entered. COLON:  The colonic mucosa from the sigmoid to the rectum was examined. The mucosa was mostly coated with solid stool and unable to be evaluated. ANUS/RECTUM: Poor bowel prep precluded adequate exam. .     EBL: 0cc's. PATH:  None. Complications: None; patient tolerated the procedure well. Attending Attestation: I performed the procedure.     Erin El MD Satisfactory Satisfactory no

## 2022-05-18 NOTE — H&P
Chief complaint/HPI and PMH:  Please refer to outpatient note below or attached to the chart. Today's exam:  LUNGS:  Clear and equal.  COR:  RRR without murmurs heard. NEURO:  A and Oriented fully. I have thoroughly explained the preparation, procedure and sedation process to the patient as well as the risks and alternatives. They will sign informed consent prior to the procedure. Pat Palacios MD                          Patient:   Sonia Reza  YOB: 1960   Date:                        03/24/2022 2:15 PM   Visit Type:                  Consult  Provider:   YESSICA Longoria  Referring Provider:  Tomasa Hardy MD [facility].    This 64year old  patient was referred by Tomasa Hardy MD.  This 64year old female presents for Dysphagia:.    History of Present Illness:  1. Dysphagia:     Ms. Paulette Ruby is a 65 y/o new patient with a PMHx  of anxiety, depression, HTN, and thyroid disease who was seen today at the request of Dr. Bunny Schneider for further evaluation of dysphagia. Dr. Bunny Schneider office visit note from 2/2/22 and barium esophagram from 2/9/22 were reviewed. Onset of dysphagia occurred 2 months ago. She has trouble swallowing solids with food boluses getting hung up at the sternal notch. She has to vomit in order to alleviate food bolus. No liquid dysphagia. Reports rare mild heartburn symptoms that requires no treatment. Barium swallow revealed a Schatzki's ring, small sliding-type hiatal hernia, & no evidence of GERD. She has had no prior EGD. No weight loss. Patient reports having a colonoscopy 10 years ago at [facility]. We cannot locate any colonoscopy report and the [facility]'s EMR system. Per patient, colonoscopy was negative. No first-degree relatives with colon cancer or polyps. She denies abdominal pain, changes in bowel habits, or rectal bleeding.         Past Medical/Surgical History:   (Detailed)  Disease/disorder Onset Date Management Date Comments   Anxiety       Depression       High cholesterol       Hypertension       Thyroid disease         Additional Medical History  Morbid obesity BMI 50    Additional Surgical History  CCY  Hysterectomy  Tonsillectomy    Family History:  (Detailed)  Relationship Family Member Name  Age at Death Condition Onset Age Cause of Death       No family history of Cancer, colon  N       Social History:  (Detailed)  Preferred language is English. Tobacco use status: Current non-smoker. Smoking status: Never smoker. ALCOHOL  There is no history of alcohol use. Current Medications:  Medication Generic Name Directions   amlodipine besylate (bulk) amlodipine besylate take daily   folic acid 1 mg tablet folic acid take 1 tablet by oral route  every day   ibuprofen 800 mg tablet ibuprofen take 1 tablet by oral route 2 times every day with food   iron 325 mg (65 mg iron) tablet ferrous sulfate take 1 tablet by oral route  every day   sertraline 100 mg tablet sertraline HCl take 1 tablet by oral route  every day     Allergies:  Ingredient Reaction (Severity) Medication Name Comment   Milli      Reviewed, updated. Review of Systems:  An 11-point review of systems was negative except as mentioned in the HPI and Past Medical History. Vital Signs:  BP mm/Hg Pulse Resp Pulse Ox Temp F Ht (Total in.) Weight (lbs.) Weight (oz.) BMI   142/84  16  97.70 68.00 331.20  50.36     Physical Exam:  GENERAL: A well nourished, well hydrated patient who appears their stated age. SKIN: Extremities and face reveal no rashes. No palmar erythema. HEENT: Sclerae anicteric. No oral ulcerations or abnormal pigment of the lips. PERRLA  CARDIOVASCULAR: RRR. No M/G/R. Carotids without bruits. RESPIRATORY:  Clear breath sounds with no rales or wheezes. GI: The abdomen is soft, ND/NT with NABS. Neg for HSM. No masses palpable. RECTAL: Deferred. MUSCULOSKELETAL: Gait appears steady.  No pitting edema of the lower legs. NEUROLOGICAL: Gross memory appears intact. A&O x3  PSYCHIATRIC: Mood appears appropriate with judgement intact. LYMPHATIC: No cervical or supraclavicular adenopathy. Assessment/Plan:  # Detail Type Description    1. Assessment Esophageal dysphagia (R13.19). Plan Orders Further evaluations ordered today include EGD W/WO Cytology to be performed, Next Lab Date is on 05/18/2022.         2. Assessment Colon cancer screening (Z12.11). Plan Orders Further evaluations ordered today include [place] W/WO Cytology [id number] to be performed, Next Lab Date is on 05/18/2022.         3. Assessment BMI 50.0-59.9, adult (E38.08). Provider Plan 25-year-old female with MMPs outlined above who presents today with a 2 month history of solid food dysphagia. Recent barium swallow revealed Schatzki's ring and small HH. No evidence of GERD. She has had no prior EGD. Proceed with upper endoscopy with dilation. Careful mastication. She is due for surveillance colonoscopy as well. ASA PS III  Plan procedure includes EGD with esophageal dilation / screening colonoscopy - hospital setting given the BMI of 50. The risks of the procedures were discussed with patient including but not limited to anesthesia, bleeding, and perforation. Patient verbalizes understanding and wishes to proceed with EGD / colonoscopy. Any modifications to patient's medications discussed at today's visit    Further recommendations pending endoscopic findings             Elements of this note may have been dictated using speech recognition software. As a result, errors of speech recognition may have occurred.       Provider:   Karan Peters 03/24/2022 3:41 PM   Document generated by: Deuce Haque 03/24/2022    CC Providers:  [address] [place] [address], [address] [place] [address],First Floor-      Electronically signed by Deuce HUGHES on 03/24/2022 03:43 PM

## 2022-05-18 NOTE — ANESTHESIA PREPROCEDURE EVALUATION
Anesthetic History   No history of anesthetic complications            Review of Systems / Medical History  Patient summary reviewed and pertinent labs reviewed    Pulmonary        Sleep apnea: CPAP           Neuro/Psych   Within defined limits           Cardiovascular    Hypertension: well controlled              Exercise tolerance: >4 METS     GI/Hepatic/Renal  Within defined limits              Endo/Other      Hypothyroidism: well controlled  Morbid obesity     Other Findings              Physical Exam    Airway  Mallampati: II  TM Distance: > 6 cm  Neck ROM: normal range of motion   Mouth opening: Normal     Cardiovascular  Regular rate and rhythm,  S1 and S2 normal,  no murmur, click, rub, or gallop             Dental    Dentition: Edentulous     Pulmonary  Breath sounds clear to auscultation               Abdominal  GI exam deferred       Other Findings            Anesthetic Plan    ASA: 3  Anesthesia type: total IV anesthesia          Induction: Intravenous  Anesthetic plan and risks discussed with: Patient

## 2022-05-18 NOTE — H&P
Chief complaint/HPI and PMH:  Please refer to outpatient note below or attached to the chart. Today's exam:  LUNGS:  Clear and equal.  COR:  RRR without murmurs heard. NEURO:  A and Oriented fully.     I have thoroughly explained the preparation, procedure and sedation process to the patient as well as the risks and alternatives. They will sign informed consent prior to the procedure.          Orquidea Ramos MD                                       Patient:                                    Mayelin Everett  YOB: 1960                                                                                                      Date:                                        03/24/2022 2:15 PM   Visit Type:                   Consult  Provider:                                  YESSICA Richardson  Referring Provider:                  Christina Brunson MD [facility].     This 64year old  patient was referred by Christina Brunson MD.  This 64year old female presents for Dysphagia:.     History of Present Illness:  1. Dysphagia:      Ms. Miriam Blanca is a 63 y/o new patient with a PMHx  of anxiety, depression, HTN, and thyroid disease who was seen today at the request of Dr. Princess Painter for further evaluation of dysphagia. Dr. Princess Painter office visit note from 2/2/22 and barium esophagram from 2/9/22 were reviewed. Onset of dysphagia occurred 2 months ago. She has trouble swallowing solids with food boluses getting hung up at the sternal notch. She has to vomit in order to alleviate food bolus. No liquid dysphagia. Reports rare mild heartburn symptoms that requires no treatment. Barium swallow revealed a Schatzki's ring, small sliding-type hiatal hernia, & no evidence of GERD. She has had no prior EGD. No weight loss.     Patient reports having a colonoscopy 10 years ago at [facility]. We cannot locate any colonoscopy report and the [facility]'s EMR system. Per patient, colonoscopy was negative. No first-degree relatives with colon cancer or polyps. She denies abdominal pain, changes in bowel habits, or rectal bleeding.         Past Medical/Surgical History:   (Detailed)  Disease/disorder Onset Date Management Date Comments   Anxiety           Depression           High cholesterol           Hypertension           Thyroid disease              Additional Medical History  Morbid obesity BMI 50     Additional Surgical History  CCY  Hysterectomy  Tonsillectomy     Family History:  (Detailed)  Relationship Family Member Name  Age at Death Condition Onset Age Cause of Death           No family history of Cancer, colon   N         Social History:  (Detailed)  Preferred language is English. Tobacco use status: Current non-smoker. Smoking status: Never smoker. ALCOHOL  There is no history of alcohol use.      Current Medications:  Medication Generic Name Directions   amlodipine besylate (bulk) amlodipine besylate take daily   folic acid 1 mg tablet folic acid take 1 tablet by oral route  every day   ibuprofen 800 mg tablet ibuprofen take 1 tablet by oral route 2 times every day with food   iron 325 mg (65 mg iron) tablet ferrous sulfate take 1 tablet by oral route  every day   sertraline 100 mg tablet sertraline HCl take 1 tablet by oral route  every day      Allergies:  Ingredient Reaction (Severity) Medication Name Comment   CODEINE         MORPHINE         NITRIC OXIDE         Reviewed, updated.     Review of Systems:  An 11-point review of systems was negative except as mentioned in the HPI and Past Medical History.     Vital Signs:  BP mm/Hg Pulse Resp Pulse Ox Temp F Ht (Total in.) Weight (lbs.) Weight (oz.) BMI   142/84   16   97.70 68.00 331.20   50.36      Physical Exam:  GENERAL: A well nourished, well hydrated patient who appears their stated age. SKIN: Extremities and face reveal no rashes. No palmar erythema. HEENT: Sclerae anicteric. No oral ulcerations or abnormal pigment of the lips. PERRLA  CARDIOVASCULAR: RRR. No M/G/R. Carotids without bruits. RESPIRATORY:  Clear breath sounds with no rales or wheezes. GI: The abdomen is soft, ND/NT with NABS. Neg for HSM. No masses palpable. RECTAL: Deferred. MUSCULOSKELETAL: Gait appears steady. No pitting edema of the lower legs. NEUROLOGICAL: Gross memory appears intact. A&O x3  PSYCHIATRIC: Mood appears appropriate with judgement intact. LYMPHATIC: No cervical or supraclavicular adenopathy.     Assessment/Plan:  # Detail Type Description    1. Assessment Esophageal dysphagia (R13.19).   Plan Orders Further evaluations ordered today include EGD W/WO Cytology to be performed, Next Lab Date is on 05/18/2022.            2. Assessment Colon cancer screening (Z12.11).   Plan Orders Further evaluations ordered today include [place] W/WO Cytology [id number] to be performed, Next Lab Date is on 05/18/2022.            3. Assessment BMI 50.0-59.9, adult (L80.24).   Provider Plan 78-year-old female with MMPs outlined above who presents today with a 2 month history of solid food dysphagia. Recent barium swallow revealed Schatzki's ring and small HH. No evidence of GERD. She has had no prior EGD. Proceed with upper endoscopy with dilation.     Careful mastication.     She is due for surveillance colonoscopy as well.     ASA PS III  Plan procedure includes EGD with esophageal dilation / screening colonoscopy - hospital setting given the BMI of 50. The risks of the procedures were discussed with patient including but not limited to anesthesia, bleeding, and perforation. Patient verbalizes understanding and wishes to proceed with EGD / colonoscopy. Any modifications to patient's medications discussed at today's visit     Further recommendations pending endoscopic findings                  Elements of this note may have been dictated using speech recognition software.   As a result, errors of speech recognition may have occurred.        Provider:   Maria Esther Jackson Shahida Suarez 03/24/2022 3:41 PM   Document generated by: Lilly Chaudhary 03/24/2022     CC Providers:  [facility], [address] [place] [address],[facility] Floor-        Electronically signed by Lilly HUGHES on 03/24/2022 03:43 PM

## 2022-05-18 NOTE — PROCEDURES
Esophagogastroduodenoscopy (EGD) Procedure Note    Procedure: EGD    Pre-operative Diagnosis: GERD   Dyspphagia to solids      Post-operative Diagnosis: Erosive esophagitis-L a grade B  Esophageal stricture GE junction-mild trauma after a 56-French Serrano dilation     Recommendations:  PPI b.i.d. for 1 month and then decrease to once daily-follow up outpatient as needed. Follow up:   Colonoscopy to follow this procedure-outpatient follow up as needed     Anesthesia/Sedation: MAC, (see separate report). Procedure Details:  Informed consent was obtained for the procedure, including sedation. Risks of perforation, hemorrhage, adverse drug reaction and aspiration were discussed. The patient was placed in the left lateral decubitus position. Based on the pre-procedure assessment, including review of the patient's medical history, medications, allergies, and review of systems, she had been deemed to be an appropriate candidate for anesthesia. The patient was monitored continuously with ECG tracing, pulse oximetry, blood pressure monitoring, and direct observations. Please refer to the anesthesia record for details and dosages of medications. The esophagus was intubated with direct visualization. The esophagus, stomach and duodenum were traversed to the full extent of the endoscope. Retroflexed views of the cardia and fundus were performed. The stomach was fully insufflated and deflated. Findings:   OROPHARYNX: The oropharynx was briefly viewed on entry and withdrawal with very brief evaluation of the cords, arytenoids and pyriform sinuses. No abnormalities found. ESOPHAGUS:  There was a prominent erosion at the GE junction which was about 2 cm in length. There was no apparent stricture but after passage of Serrano 56-French there was mild trauma in that region. No deep tears or active bleeding.  The endoscope was easily passed into the gastric pouch and there was no apparent hiatal hernia. STOMACH: The gastric pouch inflated and deflated normally. The mucosal surface and gastric rugae were normal without erosions, ulcerations, raised lesions, vascular ectasias or other anomalies. The pylorus was patent to passage of the endoscope without difficulty. DUODENUM:  The endoscope was easily passed into the third section of the duodenum. The villous mucosa was normal without blunting or scalloped folds. There were no mucosal erosions, ulcerations, raised lesions or vascular ectasias. EBL: <5 cc's. Pathology speciment:  None. Complications: No apparent complications and the patient tolerated sedation and the procedure well. Attending Attestation: I performed the procedure.     Nolan Roberts MD

## 2022-05-18 NOTE — ANESTHESIA POSTPROCEDURE EVALUATION
Procedure(s):  ESOPHAGOGASTRODUODENOSCOPY (EGD)  COLONOSCOPY/ 51  ESOPHAGEAL DILATION. total IV anesthesia    Anesthesia Post Evaluation      Multimodal analgesia: multimodal analgesia used between 6 hours prior to anesthesia start to PACU discharge  Patient location during evaluation: bedside  Patient participation: complete - patient participated  Level of consciousness: awake  Pain management: adequate  Airway patency: patent  Anesthetic complications: no  Cardiovascular status: acceptable  Respiratory status: spontaneous ventilation and acceptable  Hydration status: acceptable  Post anesthesia nausea and vomiting:  none      INITIAL Post-op Vital signs:   Vitals Value Taken Time   /75 05/18/22 1340   Temp 36 °C (96.8 °F) 05/18/22 1301   Pulse 86 05/18/22 1341   Resp 16 05/18/22 1330   SpO2 100 % 05/18/22 1335   Vitals shown include unvalidated device data.

## 2022-06-09 DIAGNOSIS — M17.12 PRIMARY OSTEOARTHRITIS OF LEFT KNEE: Primary | ICD-10-CM

## 2022-06-09 RX ORDER — IBUPROFEN 800 MG/1
800 TABLET ORAL EVERY 6 HOURS PRN
Qty: 120 TABLET | Refills: 1 | Status: SHIPPED | OUTPATIENT
Start: 2022-06-09 | End: 2022-09-20 | Stop reason: SDUPTHER

## 2022-08-03 ENCOUNTER — OFFICE VISIT (OUTPATIENT)
Dept: ORTHOPEDIC SURGERY | Age: 62
End: 2022-08-03
Payer: MEDICAID

## 2022-08-03 VITALS — WEIGHT: 293 LBS | HEIGHT: 66 IN | BODY MASS INDEX: 47.09 KG/M2

## 2022-08-03 DIAGNOSIS — M17.11 PRIMARY OSTEOARTHRITIS OF RIGHT KNEE: ICD-10-CM

## 2022-08-03 DIAGNOSIS — M17.12 PRIMARY OSTEOARTHRITIS OF LEFT KNEE: Primary | ICD-10-CM

## 2022-08-03 PROCEDURE — 20610 DRAIN/INJ JOINT/BURSA W/O US: CPT | Performed by: PHYSICIAN ASSISTANT

## 2022-08-03 RX ORDER — METHYLPREDNISOLONE ACETATE 40 MG/ML
80 INJECTION, SUSPENSION INTRA-ARTICULAR; INTRALESIONAL; INTRAMUSCULAR; SOFT TISSUE ONCE
Status: COMPLETED | OUTPATIENT
Start: 2022-08-03 | End: 2022-08-03

## 2022-08-03 RX ADMIN — METHYLPREDNISOLONE ACETATE 80 MG: 40 INJECTION, SUSPENSION INTRA-ARTICULAR; INTRALESIONAL; INTRAMUSCULAR; SOFT TISSUE at 10:05

## 2022-08-03 NOTE — PROGRESS NOTES
Name: Marylen Gehrig  YOB: 1960  Gender: female  MRN: [id number]     CC: Bilateral knee pain and stiffness     HPI:  This patient presents with a long history of bilateral knee pain secondary to osteoarthritis. The pain has become quite limiting as she has frequent falls and often uses her wheelchair or walker to ambulate  She has had a series of viscosupplementation injections without much improvement. She does get temporary relief with cortisone injections. She was previously referred to bariatric clinic for weight loss, however she states her 's insurance would not cover these visits. She did previously see Dr. Adeline Wilkerson for evaluation of knee replacement surgery and was told that she could have the surgery after weight loss. She has been working very hard on this and has made dietary changes as well as starting water aerobics. She has done very well with this and has lost 13lbs since her last office visit. Procedure Note    Patient Name: Marylen Gehrig    Date of Procedure: August 3, 2022    Preoperative Diagnosis: No diagnosis found. Post Operative Diagnosis: same    Procedure: bilateral knees joint Injection    Consent: The patient was given the opportunity to ask questions regarding the procedure and its associated risks. Procedure: The patient was placed in a sitting position on the exam table and both knees were prepped in the usual sterile manner. Each knee was anesthestized with 3 cc of xylocaine with a 25 gauge needle, the needle was left in place a total of 80 methylprednisolone and 5 cc of Lidocaine was slowly injected. The patient tolerated the procedure well. The injection areas were cleaned and Band-Aid applied. No excessive bleeding was noted. Patient dressed and was discharged to home with instructions. Discussion:  The patient tolerated the procedure well.     Follow up: four months     This patient's clinical history and physical exam is consistent with osteoarthritis of the bilateral  knee(s). We discussed the natural history of this condition as a degenerative process that causes inflammation of the joints due to a breakdown of cartilage, the hard, thick tissue that cushions the joints. We discussed that osteoarthritis never completely resolves on its own and symptoms including pain, stiffness, and swelling may wax and wane as the condition progresses. She understands that conservative treatments typically include activity modification, NSAIDs and physical therapy. Oral and/or steroid injections into the joint could be considered in resistant scenarios. Viscosupplementation injections may also be useful as a temporary cartilage replacement in certain patients. I advised to avoid any prolonged bedrest and to try to maintain ADLs as much as possible. The patient was counseled to follow up with me should she develop any worsening symptoms that begin to limit activities of daily living. Patient is doing a fantastic job with her weight loss. I encouraged her to keep this up in hopes of potentially doing a knee replacement next year. In the meantime we will continue to monitor her and inject her knees periodically as needed. ----The patient will be treated observantly with self directed symptomatic care. Instructions were given to follow up if there is any neurologic or functional decline.  ----The patient tolerated cortisone injection well today.  ----Weight Loss: We discussed that pain and problems in the hip and/or knee may be aggravated by obesity. The effectiveness of joint replacement surgery can also be affected by a patient's weight. Obese patients are higher risk of infections and failure of hardware due to weight stresses. We discussed that attention to weight loss before undergoing surgery may improve the healing process and overall outcome. Weight loss options include: diet, exercise, nutritional support and bariatric surgery.           Emelyn Yanez [place]  August 3, 2022

## 2022-08-09 DIAGNOSIS — M17.12 PRIMARY OSTEOARTHRITIS OF LEFT KNEE: ICD-10-CM

## 2022-08-15 RX ORDER — IBUPROFEN 800 MG/1
800 TABLET ORAL EVERY 6 HOURS PRN
Qty: 120 TABLET | Refills: 1 | OUTPATIENT
Start: 2022-08-15

## 2022-09-20 DIAGNOSIS — M17.12 PRIMARY OSTEOARTHRITIS OF LEFT KNEE: ICD-10-CM

## 2022-09-20 RX ORDER — IBUPROFEN 800 MG/1
800 TABLET ORAL EVERY 6 HOURS PRN
Qty: 120 TABLET | Refills: 1 | Status: SHIPPED | OUTPATIENT
Start: 2022-09-20

## 2022-10-03 RX ORDER — IBUPROFEN 800 MG/1
800 TABLET ORAL EVERY 6 HOURS PRN
Qty: 120 TABLET | Refills: 1 | OUTPATIENT
Start: 2022-10-03

## 2022-11-17 DIAGNOSIS — M17.12 PRIMARY OSTEOARTHRITIS OF LEFT KNEE: ICD-10-CM

## 2022-11-29 RX ORDER — IBUPROFEN 800 MG/1
800 TABLET ORAL EVERY 6 HOURS PRN
Qty: 120 TABLET | Refills: 1 | Status: SHIPPED | OUTPATIENT
Start: 2022-11-29

## 2023-01-11 ENCOUNTER — OFFICE VISIT (OUTPATIENT)
Dept: ORTHOPEDIC SURGERY | Age: 63
End: 2023-01-11
Payer: MEDICAID

## 2023-01-11 VITALS — BODY MASS INDEX: 51.65 KG/M2 | WEIGHT: 293 LBS

## 2023-01-11 DIAGNOSIS — M17.12 PRIMARY OSTEOARTHRITIS OF LEFT KNEE: Primary | ICD-10-CM

## 2023-01-11 DIAGNOSIS — M17.11 PRIMARY OSTEOARTHRITIS OF RIGHT KNEE: ICD-10-CM

## 2023-01-11 PROCEDURE — 20610 DRAIN/INJ JOINT/BURSA W/O US: CPT | Performed by: PHYSICIAN ASSISTANT

## 2023-01-11 RX ORDER — TRIAMCINOLONE ACETONIDE 40 MG/ML
80 INJECTION, SUSPENSION INTRA-ARTICULAR; INTRAMUSCULAR ONCE
Status: COMPLETED | OUTPATIENT
Start: 2023-01-11 | End: 2023-01-11

## 2023-01-11 RX ADMIN — TRIAMCINOLONE ACETONIDE 80 MG: 40 INJECTION, SUSPENSION INTRA-ARTICULAR; INTRAMUSCULAR at 11:46

## 2023-01-11 NOTE — PROGRESS NOTES
Procedure Note    Patient Name: Julianna Files    Date of Procedure: January 11, 2023    Preoperative Diagnosis:     ICD-10-CM    1. Primary osteoarthritis of left knee  M17.12 triamcinolone acetonide (KENALOG-40) injection 80 mg     TX ARTHROCENTESIS ASPIR&/INJ MAJOR JT/BURSA W/O US     triamcinolone acetonide (KENALOG-40) injection 80 mg      2. Primary osteoarthritis of right knee  M17.11 triamcinolone acetonide (KENALOG-40) injection 80 mg     TX ARTHROCENTESIS ASPIR&/INJ MAJOR JT/BURSA W/O US     triamcinolone acetonide (KENALOG-40) injection 80 mg          Post Operative Diagnosis: same    Procedure: bilateral knees joint Injection    Consent: The patient was given the opportunity to ask questions regarding the procedure and its associated risks. Procedure: The patient was placed in a sitting position on the exam table and both knees were prepped in the usual sterile manner. Each knee was anesthestized with 3 cc of xylocaine with a 25 gauge needle, the needle was left in place a total of 80 kenalog and 5 cc of Lidocaine was slowly injected. The patient tolerated the procedure well. The injection areas were cleaned and Band-Aid applied. No excessive bleeding was noted. Patient dressed and was discharged to home with instructions. Discussion:  The patient tolerated the procedure well.     Follow up: four months        ECTOR Armas  January 11, 2023

## 2023-03-13 ENCOUNTER — TELEPHONE (OUTPATIENT)
Dept: ORTHOPEDIC SURGERY | Age: 63
End: 2023-03-13

## 2023-03-29 ENCOUNTER — OFFICE VISIT (OUTPATIENT)
Dept: ORTHOPEDIC SURGERY | Age: 63
End: 2023-03-29
Payer: COMMERCIAL

## 2023-03-29 VITALS — HEIGHT: 66 IN | WEIGHT: 293 LBS | BODY MASS INDEX: 47.09 KG/M2

## 2023-03-29 DIAGNOSIS — M17.11 PRIMARY OSTEOARTHRITIS OF RIGHT KNEE: ICD-10-CM

## 2023-03-29 DIAGNOSIS — M17.12 PRIMARY OSTEOARTHRITIS OF LEFT KNEE: Primary | ICD-10-CM

## 2023-03-29 PROCEDURE — 20610 DRAIN/INJ JOINT/BURSA W/O US: CPT | Performed by: PHYSICIAN ASSISTANT

## 2023-03-29 RX ORDER — METHYLPREDNISOLONE ACETATE 40 MG/ML
40 INJECTION, SUSPENSION INTRA-ARTICULAR; INTRALESIONAL; INTRAMUSCULAR; SOFT TISSUE ONCE
Status: COMPLETED | OUTPATIENT
Start: 2023-03-29 | End: 2023-03-29

## 2023-03-29 RX ADMIN — METHYLPREDNISOLONE ACETATE 40 MG: 40 INJECTION, SUSPENSION INTRA-ARTICULAR; INTRALESIONAL; INTRAMUSCULAR; SOFT TISSUE at 13:30

## 2023-03-29 RX ADMIN — METHYLPREDNISOLONE ACETATE 40 MG: 40 INJECTION, SUSPENSION INTRA-ARTICULAR; INTRALESIONAL; INTRAMUSCULAR; SOFT TISSUE at 13:29

## 2023-03-29 NOTE — PROGRESS NOTES
options include: diet, exercise, nutritional support and bariatric surgery.           ECTOR Olmstead  March 29, 2023

## 2023-03-31 ENCOUNTER — TELEPHONE (OUTPATIENT)
Dept: ORTHOPEDIC SURGERY | Age: 63
End: 2023-03-31

## 2023-03-31 NOTE — TELEPHONE ENCOUNTER
Pt  ask  can you write her a letter  to  help  her  get a  2  bedroom apt.   She may need a medical letter  for  the  landlord  I  am not  sure

## 2023-04-03 DIAGNOSIS — M17.12 PRIMARY OSTEOARTHRITIS OF LEFT KNEE: ICD-10-CM

## 2023-04-03 RX ORDER — IBUPROFEN 800 MG/1
800 TABLET ORAL EVERY 6 HOURS PRN
Qty: 120 TABLET | Refills: 1 | OUTPATIENT
Start: 2023-04-03

## 2023-04-03 NOTE — TELEPHONE ENCOUNTER
Called patient she stated she needs a letter for a two bedroom apartment so she can have a care giver.

## 2023-05-22 DIAGNOSIS — M17.12 PRIMARY OSTEOARTHRITIS OF LEFT KNEE: ICD-10-CM

## 2023-05-22 NOTE — TELEPHONE ENCOUNTER
She is asking for a call from someone with [facility]. She says he knee pain is getting unbearable.

## 2023-05-22 NOTE — TELEPHONE ENCOUNTER
Patient stated she is having a flare up & will follow up in a few months on BMI check. She will continue ibuprofen, she wanted to see if dr. Brittaney Pedroza will give her 800 mg ibuprofen I explained she can take 4 200mg tabs if needed but I can ask him.

## 2023-05-23 RX ORDER — IBUPROFEN 800 MG/1
800 TABLET ORAL EVERY 6 HOURS PRN
Qty: 120 TABLET | Refills: 1 | Status: SHIPPED | OUTPATIENT
Start: 2023-05-23

## 2023-08-23 ENCOUNTER — OFFICE VISIT (OUTPATIENT)
Dept: ORTHOPEDIC SURGERY | Age: 63
End: 2023-08-23
Payer: COMMERCIAL

## 2023-08-23 DIAGNOSIS — M17.11 PRIMARY OSTEOARTHRITIS OF RIGHT KNEE: ICD-10-CM

## 2023-08-23 DIAGNOSIS — M17.12 PRIMARY OSTEOARTHRITIS OF LEFT KNEE: Primary | ICD-10-CM

## 2023-08-23 PROCEDURE — 20610 DRAIN/INJ JOINT/BURSA W/O US: CPT | Performed by: PHYSICIAN ASSISTANT

## 2023-08-23 RX ORDER — METHYLPREDNISOLONE ACETATE 40 MG/ML
80 INJECTION, SUSPENSION INTRA-ARTICULAR; INTRALESIONAL; INTRAMUSCULAR; SOFT TISSUE ONCE
Status: COMPLETED | OUTPATIENT
Start: 2023-08-23 | End: 2023-08-23

## 2023-08-23 RX ADMIN — METHYLPREDNISOLONE ACETATE 80 MG: 40 INJECTION, SUSPENSION INTRA-ARTICULAR; INTRALESIONAL; INTRAMUSCULAR; SOFT TISSUE at 14:45

## 2023-08-23 NOTE — PROGRESS NOTES
Procedure Note    Patient Name: Melly García    Date of Procedure: August 23, 2023    Preoperative Diagnosis:     ICD-10-CM    1. Primary osteoarthritis of left knee  M17.12 methylPREDNISolone acetate (DEPO-MEDROL) injection 80 mg     MT ARTHROCENTESIS ASPIR&/INJ MAJOR JT/BURSA W/O US      2. Primary osteoarthritis of right knee  M17.11 methylPREDNISolone acetate (DEPO-MEDROL) injection 80 mg     MT ARTHROCENTESIS ASPIR&/INJ MAJOR JT/BURSA W/O US          Post Operative Diagnosis: same    Procedure: bilateral knees joint Injection    Consent: The patient was given the opportunity to ask questions regarding the procedure and its associated risks. Procedure: The patient was placed in a sitting position on the exam table and both knees were prepped in the usual sterile manner. Each knee was anesthestized with 3 cc of xylocaine with a 25 gauge needle, the needle was left in place a total of 2cc of 40mg methylprednisolone and 5 cc of Lidocaine was slowly injected. The patient tolerated the procedure well. The injection areas were cleaned and Band-Aid applied. No excessive bleeding was noted. Patient dressed and was discharged to home with instructions. Discussion:  The patient tolerated the procedure well.     Follow up: four months        ECTOR Russ  August 23, 2023

## 2023-09-13 ENCOUNTER — TELEPHONE (OUTPATIENT)
Dept: ORTHOPEDIC SURGERY | Age: 63
End: 2023-09-13

## 2023-10-03 ENCOUNTER — TELEPHONE (OUTPATIENT)
Dept: ORTHOPEDIC SURGERY | Age: 63
End: 2023-10-03

## 2023-10-03 NOTE — TELEPHONE ENCOUNTER
Informed patient the walker she wants she can find the place that has one and we can get an order sent over.  Insurance will only cover every 3 years, patient expressed understanding

## 2023-10-16 ENCOUNTER — TELEPHONE (OUTPATIENT)
Dept: ORTHOPEDIC SURGERY | Age: 63
End: 2023-10-16

## 2023-10-16 DIAGNOSIS — M17.11 PRIMARY OSTEOARTHRITIS OF RIGHT KNEE: ICD-10-CM

## 2023-10-16 DIAGNOSIS — M17.12 PRIMARY OSTEOARTHRITIS OF LEFT KNEE: Primary | ICD-10-CM

## 2023-10-16 NOTE — TELEPHONE ENCOUNTER
She is wanting to get another walker rx sent to [facility]. [address] She needs an \"obesity\" walker.

## 2023-10-16 NOTE — TELEPHONE ENCOUNTER
Confirmed that we would send an obesity walker order to [facility] in [place].

## 2023-10-18 ENCOUNTER — TELEPHONE (OUTPATIENT)
Dept: ORTHOPEDIC SURGERY | Age: 63
End: 2023-10-18

## 2023-10-18 DIAGNOSIS — M17.11 PRIMARY OSTEOARTHRITIS OF RIGHT KNEE: ICD-10-CM

## 2023-10-18 DIAGNOSIS — M17.12 PRIMARY OSTEOARTHRITIS OF LEFT KNEE: Primary | ICD-10-CM

## 2023-10-18 NOTE — TELEPHONE ENCOUNTER
She is requesting that you send the RX for the rollator to [address] [place] [address]. Fax # [phone]. The original place did not take her insurance. She is asking for a call when it is sent.

## 2023-10-18 NOTE — TELEPHONE ENCOUNTER
Sent order for an obesity walker to [address] [facility]. Called pt to confirm that fax was sent.

## 2023-10-31 ENCOUNTER — TELEPHONE (OUTPATIENT)
Dept: ORTHOPEDIC SURGERY | Age: 63
End: 2023-10-31

## 2023-10-31 NOTE — TELEPHONE ENCOUNTER
Pt is trying to get a Alan Fallen and the company  Sent over a form for Zuri Ying to sign and  Fax back to  [phone]

## 2024-01-03 ENCOUNTER — TELEPHONE (OUTPATIENT)
Dept: ORTHOPEDIC SURGERY | Age: 64
End: 2024-01-03

## 2024-01-03 NOTE — TELEPHONE ENCOUNTER
She left a voicemail message wanting to know where she got her walker from. She says something is wrong with it and she don't remember where she got it. She is asking for the phone # .

## 2024-01-03 NOTE — TELEPHONE ENCOUNTER
Talked to patient and told her that we sent the walker order to [facility] Fax# [phone]. Told pt to call back if she needed anything further concerning her walker.

## 2024-01-22 NOTE — PROGRESS NOTES
period.   4.  Exercise assessment with [facility].   5. EKG      She will return after the above are complete for assessment of her progress in 2-4 weeks and we will discuss the medication of choice.      Time: I spent 60 minutes preparing to see patient (including chart review and preparation), obtaining and/or reviewing additional medical history, performing a physical exam and evaluation, documenting clinical information in the electronic health record, independently interpreting results, communicating results to patient, family or caregiver, and/or coordinating care.        Signed: Lizbeth Zurita MD  Bariatric & Minimally Invasive Surgery  1/25/2024

## 2024-01-25 ENCOUNTER — OFFICE VISIT (OUTPATIENT)
Dept: SURGERY | Age: 64
End: 2024-01-25
Payer: MEDICAID

## 2024-01-25 ENCOUNTER — CLINICAL DOCUMENTATION (OUTPATIENT)
Dept: OTHER | Age: 64
End: 2024-01-25

## 2024-01-25 VITALS
SYSTOLIC BLOOD PRESSURE: 140 MMHG | HEIGHT: 65 IN | HEART RATE: 99 BPM | BODY MASS INDEX: 48.82 KG/M2 | DIASTOLIC BLOOD PRESSURE: 81 MMHG | WEIGHT: 293 LBS

## 2024-01-25 DIAGNOSIS — E78.2 MIXED HYPERLIPIDEMIA: ICD-10-CM

## 2024-01-25 DIAGNOSIS — E65 ABDOMINAL OBESITY: ICD-10-CM

## 2024-01-25 DIAGNOSIS — K21.9 GASTROESOPHAGEAL REFLUX DISEASE, UNSPECIFIED WHETHER ESOPHAGITIS PRESENT: ICD-10-CM

## 2024-01-25 DIAGNOSIS — I10 ESSENTIAL HYPERTENSION: Primary | ICD-10-CM

## 2024-01-25 DIAGNOSIS — G47.33 OSA (OBSTRUCTIVE SLEEP APNEA): ICD-10-CM

## 2024-01-25 DIAGNOSIS — E66.01 CLASS 3 SEVERE OBESITY DUE TO EXCESS CALORIES WITH SERIOUS COMORBIDITY AND BODY MASS INDEX (BMI) OF 50.0 TO 59.9 IN ADULT (HCC): ICD-10-CM

## 2024-01-25 PROCEDURE — 3077F SYST BP >= 140 MM HG: CPT | Performed by: SURGERY

## 2024-01-25 PROCEDURE — 3079F DIAST BP 80-89 MM HG: CPT | Performed by: SURGERY

## 2024-01-25 PROCEDURE — 99205 OFFICE O/P NEW HI 60 MIN: CPT | Performed by: SURGERY

## 2024-01-25 RX ORDER — BUPROPION HYDROCHLORIDE 75 MG/1
75 TABLET ORAL 2 TIMES DAILY
COMMUNITY

## 2024-01-25 RX ORDER — FOLIC ACID 1 MG/1
1 TABLET ORAL DAILY
COMMUNITY

## 2024-01-25 RX ORDER — SERTRALINE HYDROCHLORIDE 100 MG/1
100 TABLET, FILM COATED ORAL DAILY
COMMUNITY

## 2024-01-25 RX ORDER — AMLODIPINE BESYLATE 10 MG/1
10 TABLET ORAL DAILY
COMMUNITY

## 2024-01-25 RX ORDER — OMEPRAZOLE 40 MG/1
40 CAPSULE, DELAYED RELEASE ORAL DAILY
COMMUNITY

## 2024-01-25 RX ORDER — FUROSEMIDE 20 MG/1
20 TABLET ORAL 2 TIMES DAILY
COMMUNITY

## 2024-01-25 RX ORDER — ICOSAPENT ETHYL 1000 MG/1
2 CAPSULE ORAL 2 TIMES DAILY
COMMUNITY

## 2024-01-25 NOTE — PROGRESS NOTES
Spoke to Ms. Garduno about the program and the cost. We made an appointment for 1-31-24 for exercise assessment.1-25-24    Ms. Garduno was a no show for appointment. She called late that day and rescheduled appointment for February 15. 2-2-24

## 2024-01-26 ENCOUNTER — TELEPHONE (OUTPATIENT)
Dept: ORTHOPEDIC SURGERY | Age: 64
End: 2024-01-26

## 2024-01-26 NOTE — TELEPHONE ENCOUNTER
Told pt that the diagnosis of osteoarthritis would not be covered by insurance for home health. I advised her to visit her primary care physician for a home health order.

## 2024-01-29 ENCOUNTER — HOSPITAL ENCOUNTER (OUTPATIENT)
Dept: NON INVASIVE DIAGNOSTICS | Age: 64
Discharge: HOME | End: 2024-01-31
Attending: SURGERY

## 2024-01-29 ENCOUNTER — HOSPITAL ENCOUNTER (OUTPATIENT)
Dept: NUTRITION | Age: 64
Setting detail: RECURRING SERIES
Discharge: HOME | End: 2024-02-01
Payer: MEDICAID

## 2024-01-29 DIAGNOSIS — I10 ESSENTIAL HYPERTENSION: ICD-10-CM

## 2024-01-29 DIAGNOSIS — E66.01 CLASS 3 SEVERE OBESITY DUE TO EXCESS CALORIES WITH SERIOUS COMORBIDITY AND BODY MASS INDEX (BMI) OF 50.0 TO 59.9 IN ADULT (HCC): ICD-10-CM

## 2024-01-29 DIAGNOSIS — G47.33 OSA (OBSTRUCTIVE SLEEP APNEA): ICD-10-CM

## 2024-01-29 DIAGNOSIS — K21.9 GASTROESOPHAGEAL REFLUX DISEASE, UNSPECIFIED WHETHER ESOPHAGITIS PRESENT: ICD-10-CM

## 2024-01-29 DIAGNOSIS — E78.2 MIXED HYPERLIPIDEMIA: ICD-10-CM

## 2024-01-29 LAB
EKG ATRIAL RATE: 96 BPM
EKG DIAGNOSIS: NORMAL
EKG P-R INTERVAL: 156 MS
EKG Q-T INTERVAL: 396 MS
EKG QRS DURATION: 150 MS
EKG QTC CALCULATION (BAZETT): 500 MS
EKG R AXIS: 107 DEGREES
EKG T AXIS: 169 DEGREES
EKG VENTRICULAR RATE: 96 BPM

## 2024-01-29 PROCEDURE — 97802 MEDICAL NUTRITION INDIV IN: CPT

## 2024-01-29 PROCEDURE — 93005 ELECTROCARDIOGRAM TRACING: CPT

## 2024-01-29 PROCEDURE — 93010 ELECTROCARDIOGRAM REPORT: CPT | Performed by: INTERNAL MEDICINE

## 2024-01-29 NOTE — PROGRESS NOTES
Barb Smith, MS, RD, LD  Outpatient Registered Dietitian  [facility]  Phone: [phone]  Fax: [phone]    INITIAL ASSESSMENT   Bindu Garduno is a 63 y.o. female referred with the following diagnosis (es): Dietary counseling and surveillance [Z71.3]   PMH includes HTN, HLD, GERD, hypothyroidism, OA  Labs: 8/8/2023 Total Cholesterol 225 (H) TRIG 481 (H) HDL 58 LDL 89   Meds: omeprazole    Patient stated goal: weight loss     Goal is to improve health through weight loss, be able to have knee surgery, to be able to get around better. Spouse & pt both provided diet history. Spouse cooks and takes care of pt, patient reports being 'lazy' the last 2-3 years. Not having responsibilities & allowing  to take care of household duties, cooking etc. She will do things as she wants to.     She loves fruit & vegetables, also enjoys french fries, chips. Notes she will be most successful if snack foods & cookies are not kept in the house.     Spouse cooks, reports cooking her foods by baking, broiling not frying except for fish sometimes. They do have an airfryer they want to start using. He will prepare spinach for her side and potatoes for his.     *Biggest barrier is snacking and eating out   If she is out of the house, she will get fast food for lunch. Otherwise skips lunch meal.     Factors impacting food choices:   -Established food preferences/eating behaviors  -Confusion on nutrition advice  -Poor snack choices  -Skips meals  -Dining out often   -Boredom eating     Initial Diet Recall:   B between 8 & 10AM: sausage, scrambled eggs    -snacking throughout the day     D 2-3PM: beans, sweet potatoes, spinach   Goes to bed at 5-6PM    Beverages: a lot of water, some diet juices     Eating pattern appears excessive in energy, sodium, and refined carbohydrate, and inadequate in fiber, fruit, and vegetables.    Lifestyle/Family Influence/Support:   -lives with spouse.   -goal to

## 2024-01-30 DIAGNOSIS — E78.2 MIXED HYPERLIPIDEMIA: ICD-10-CM

## 2024-01-30 DIAGNOSIS — I10 ESSENTIAL HYPERTENSION: ICD-10-CM

## 2024-01-30 DIAGNOSIS — K21.9 GASTROESOPHAGEAL REFLUX DISEASE, UNSPECIFIED WHETHER ESOPHAGITIS PRESENT: ICD-10-CM

## 2024-01-30 DIAGNOSIS — G47.33 OSA (OBSTRUCTIVE SLEEP APNEA): ICD-10-CM

## 2024-01-30 DIAGNOSIS — E66.01 CLASS 3 SEVERE OBESITY DUE TO EXCESS CALORIES WITH SERIOUS COMORBIDITY AND BODY MASS INDEX (BMI) OF 50.0 TO 59.9 IN ADULT (HCC): ICD-10-CM

## 2024-01-30 LAB
25(OH)D3 SERPL-MCNC: 32.1 NG/ML (ref 30–100)
ALBUMIN SERPL-MCNC: 3.9 G/DL (ref 3.2–4.6)
ALBUMIN/GLOB SERPL: 1.1 (ref 0.4–1.6)
ALP SERPL-CCNC: 68 U/L (ref 50–136)
ALT SERPL-CCNC: 23 U/L (ref 12–65)
ANION GAP SERPL CALC-SCNC: 3 MMOL/L (ref 2–11)
AST SERPL-CCNC: 15 U/L (ref 15–37)
BASOPHILS # BLD: 0 K/UL (ref 0–0.2)
BASOPHILS NFR BLD: 0 % (ref 0–2)
BILIRUB SERPL-MCNC: 0.4 MG/DL (ref 0.2–1.1)
BUN SERPL-MCNC: 8 MG/DL (ref 8–23)
CALCIUM SERPL-MCNC: 9.8 MG/DL (ref 8.3–10.4)
CHLORIDE SERPL-SCNC: 106 MMOL/L (ref 103–113)
CHOLEST SERPL-MCNC: 169 MG/DL
CO2 SERPL-SCNC: 29 MMOL/L (ref 21–32)
CREAT SERPL-MCNC: 0.7 MG/DL (ref 0.6–1)
DIFFERENTIAL METHOD BLD: ABNORMAL
EOSINOPHIL # BLD: 0.1 K/UL (ref 0–0.8)
EOSINOPHIL NFR BLD: 1 % (ref 0.5–7.8)
ERYTHROCYTE [DISTWIDTH] IN BLOOD BY AUTOMATED COUNT: 14.1 % (ref 11.9–14.6)
GLOBULIN SER CALC-MCNC: 3.6 G/DL (ref 2.8–4.5)
GLUCOSE SERPL-MCNC: 97 MG/DL (ref 65–100)
HCT VFR BLD AUTO: 39.9 % (ref 35.8–46.3)
HDLC SERPL-MCNC: 63 MG/DL (ref 40–60)
HDLC SERPL: 2.7
HGB BLD-MCNC: 12.5 G/DL (ref 11.7–15.4)
IMM GRANULOCYTES # BLD AUTO: 0.1 K/UL (ref 0–0.5)
IMM GRANULOCYTES NFR BLD AUTO: 1 % (ref 0–5)
LDLC SERPL CALC-MCNC: 73.2 MG/DL
LYMPHOCYTES # BLD: 4.2 K/UL (ref 0.5–4.6)
LYMPHOCYTES NFR BLD: 55 % (ref 13–44)
MCH RBC QN AUTO: 32.1 PG (ref 26.1–32.9)
MCHC RBC AUTO-ENTMCNC: 31.3 G/DL (ref 31.4–35)
MCV RBC AUTO: 102.3 FL (ref 82–102)
MONOCYTES # BLD: 0.4 K/UL (ref 0.1–1.3)
MONOCYTES NFR BLD: 5 % (ref 4–12)
NEUTS SEG # BLD: 2.9 K/UL (ref 1.7–8.2)
NEUTS SEG NFR BLD: 38 % (ref 43–78)
NRBC # BLD: 0 K/UL (ref 0–0.2)
PLATELET # BLD AUTO: 296 K/UL (ref 150–450)
PLATELET COMMENT: ADEQUATE
PMV BLD AUTO: 9.8 FL (ref 9.4–12.3)
POTASSIUM SERPL-SCNC: 4 MMOL/L (ref 3.5–5.1)
PROT SERPL-MCNC: 7.5 G/DL (ref 6.3–8.2)
RBC # BLD AUTO: 3.9 M/UL (ref 4.05–5.2)
RBC MORPH BLD: ABNORMAL
RBC MORPH BLD: ABNORMAL
SODIUM SERPL-SCNC: 138 MMOL/L (ref 136–146)
TRIGL SERPL-MCNC: 164 MG/DL (ref 35–150)
TSH, 3RD GENERATION: 0.79 UIU/ML (ref 0.36–3.74)
VLDLC SERPL CALC-MCNC: 32.8 MG/DL (ref 6–23)
WBC # BLD AUTO: 7.7 K/UL (ref 4.3–11.1)
WBC MORPH BLD: ABNORMAL

## 2024-01-31 LAB
EST. AVERAGE GLUCOSE BLD GHB EST-MCNC: 97 MG/DL
HBA1C MFR BLD: 5 % (ref 4.8–5.6)

## 2024-02-06 ENCOUNTER — TELEMEDICINE (OUTPATIENT)
Dept: SURGERY | Age: 64
End: 2024-02-06

## 2024-02-06 DIAGNOSIS — Z71.3 NUTRITIONAL COUNSELING: Primary | ICD-10-CM

## 2024-02-06 NOTE — PROGRESS NOTES
Obesity Medicine Initial Nutrition Assessment     Assessment:    Initial Consult Date 01/25/2024   Initial Height 5'5\"   Initial Weight 344lb        Estimated Nutrition Needs:  EEN for weight loss = MSJ x 1.3 - 500kcal/day = 2115kcal/day  Protein: 68-102g/day (1.0-1.5 gm/kg IBW)    Carbohydrate: 237g/day (45%)    Fat: 82g/day (35%)     Eating Habits:   Eating occasions/d: 2 x daily   Main cook at home: Pts  is main cook   Restaurant/Fast Food Intake: 2 x per week   Typical Beverage Consumption: water and apple juice (16 oz)  Food Allergies: None  Cultural Preferences: None  Diet Recall:   Breakfast: Scrambled salmon  Lunch: Sliced apples and grapes   Dinner: Fish and broccoli    Lifestyle Assessment:    Hours of sleep/night: ~5-7 hrs    Current Occupation: None    Number of people living in house and influence: 2 people including  and pt - reports  to sometimes be a fair influence.     Exercise Assessment:   PAR-Q: No contraindications to exercise   Medical:     Pain or injuries (present): Both knees and back     Past surgeries related to mobility: None   Exercise equipment at home: None    Gym Membership: [employer]   Current Exercise Routine: None   Assessment Exercise Goal: Begin regular workout routine and increase as able    DIAGNOSIS:  Class III obesity R/T hx of yoyo dieting and excessive energy intake as evidenced by BMI = 57.24 and 229 % IBW.     Intervention:   Evaluated diet recall and identified modifications.  Educated pt on mindful eating techniques and macronutrients.  Encouraged continued and increased activity.          Monitoring and Evaluation:  Monitor for continued safe, supervised weight loss for OM   F/U per MD Karen Hernández MBA, Nutritionist, RD eligible

## 2024-02-15 ENCOUNTER — CLINICAL DOCUMENTATION (OUTPATIENT)
Dept: CARDIAC REHAB | Age: 64
End: 2024-02-15

## 2024-02-15 NOTE — PROGRESS NOTES
Patient was seen today for a fitness assessment.  Completed six minute walk test on seated stepper (NuStep) d/t knee pain.  Patient in need of bilateral knee replacement, so equipment shown was non-weight bearing and appropriate for pre-surgery exercise.  She will use the gym as she is able for the remainder of the month. Jennifer Lombardo, Exercise specialist

## 2024-02-21 NOTE — PROGRESS NOTES
OBESITY MEDICINE NUTRITION REASSESSMENT     Assessment:    Pt reports eagerness to begin dietary compliance per last virtual visit. Pt is eating at least 2x/d. Pt is drinking water and OJ. Pt is exercising via walking as she is able - pt expresses knee issues.  Diet Recall:              Breakfast: Hendricks and eggs              Lunch: Hot dog              Dinner: None     Intervention:   Evaluated diet recall and identified modifications.  Educated pt on high fat meats such as hot dogs and hendricks   Encouraged pt to eat at least 3 x daily with lean protein focus  Re educated pt on lean protein sources   Encouraged continued and increased activity, as able       Monitoring and Evaluation:  Monitor for continued safe, supervised weight loss for OM.    F/U per MD Karen Hernández MBA, Nutritionist, RD eligible   
sodium (VOLTAREN) 1 % GEL Apply 4 g topically 4 times daily as needed for Pain 150 g 2    ibuprofen (ADVIL;MOTRIN) 800 MG tablet Take 1 tablet by mouth every 6 hours as needed for Pain 120 tablet 1    diclofenac sodium (VOLTAREN) 1 % GEL Apply 4g to the affected area 4 times daily. 100 g 1     No current facility-administered medications for this visit.        ALLERGIES:  Allergies   Allergen Reactions    Hydrocodone Itching    Other Nausea Only and Nausea And Vomiting     \"laughing gas causes nausea\"       ROS: The patient has no difficulty with chest pain or shortness of breath.  No fever or chills.  Comprehensive 13 point review of systems was otherwise unremarkable except as noted above.    Physical Exam:     /88   Pulse 73   Ht 1.651 m (5' 5\")   Wt (!) 156 kg (344 lb)   BMI 57.24 kg/m²          General:  Well-developed, well-nourished, no distress.  Psych:  Cooperative, good insight and judgement.  Neuro:  Alert, oriented to person, place and time.  HEENT:  Normocephalic, atraumatic. Sclera clear.  Lungs:  Unlabored breathing. Symmetrical chest expansion.   Chest wall:  No tenderness or deformity.  Heart:  Regular rate and rhythm. No JVD.  Abdomen:  Soft, non-tender, non-distended. No guarding or rebound.    Extremities:  Extremities normal, atraumatic, no cyanosis or edema.  Skin:  Skin color, texture, turgor normal. No rashes.    ASSESSMENT:  Morbid obesity with a Body mass index is 57.24 kg/m². following up in our multi-disciplinary weight loss prep program.    PLAN:  We have discussed the patient's participation and reviewed the steps in our weight loss program. She has had a long history of failed diet and exercise programs and has good understanding about the risks, benefits, and commitment related to medical weight loss.    She is interested in proceeding with our program seeking the incorporation of medication for medical weight loss to assist with her co morbidities.       Diagnosis Orders

## 2024-02-22 ENCOUNTER — OFFICE VISIT (OUTPATIENT)
Dept: SURGERY | Age: 64
End: 2024-02-22

## 2024-02-22 VITALS
SYSTOLIC BLOOD PRESSURE: 134 MMHG | HEIGHT: 65 IN | HEART RATE: 73 BPM | BODY MASS INDEX: 48.82 KG/M2 | DIASTOLIC BLOOD PRESSURE: 88 MMHG | WEIGHT: 293 LBS

## 2024-02-22 DIAGNOSIS — E66.01 CLASS 3 SEVERE OBESITY DUE TO EXCESS CALORIES WITH SERIOUS COMORBIDITY AND BODY MASS INDEX (BMI) OF 50.0 TO 59.9 IN ADULT (HCC): ICD-10-CM

## 2024-02-22 DIAGNOSIS — K21.9 GASTROESOPHAGEAL REFLUX DISEASE, UNSPECIFIED WHETHER ESOPHAGITIS PRESENT: ICD-10-CM

## 2024-02-22 DIAGNOSIS — E78.2 MIXED HYPERLIPIDEMIA: ICD-10-CM

## 2024-02-22 DIAGNOSIS — I10 ESSENTIAL HYPERTENSION: ICD-10-CM

## 2024-02-22 DIAGNOSIS — E65 ABDOMINAL OBESITY: ICD-10-CM

## 2024-02-22 DIAGNOSIS — G47.33 OSA (OBSTRUCTIVE SLEEP APNEA): Primary | ICD-10-CM

## 2024-02-22 DIAGNOSIS — Z71.3 NUTRITIONAL COUNSELING: ICD-10-CM

## 2024-03-04 DIAGNOSIS — M17.12 PRIMARY OSTEOARTHRITIS OF LEFT KNEE: ICD-10-CM

## 2024-03-05 RX ORDER — IBUPROFEN 800 MG/1
800 TABLET ORAL EVERY 6 HOURS PRN
Qty: 120 TABLET | Refills: 1 | OUTPATIENT
Start: 2024-03-05

## 2024-03-13 ENCOUNTER — OFFICE VISIT (OUTPATIENT)
Dept: SURGERY | Age: 64
End: 2024-03-13
Payer: MEDICARE

## 2024-03-13 VITALS
WEIGHT: 293 LBS | DIASTOLIC BLOOD PRESSURE: 78 MMHG | SYSTOLIC BLOOD PRESSURE: 144 MMHG | HEART RATE: 94 BPM | BODY MASS INDEX: 48.82 KG/M2 | HEIGHT: 65 IN

## 2024-03-13 DIAGNOSIS — G47.33 OSA (OBSTRUCTIVE SLEEP APNEA): Primary | ICD-10-CM

## 2024-03-13 DIAGNOSIS — I10 ESSENTIAL HYPERTENSION: ICD-10-CM

## 2024-03-13 DIAGNOSIS — E66.01 CLASS 3 SEVERE OBESITY DUE TO EXCESS CALORIES WITH SERIOUS COMORBIDITY AND BODY MASS INDEX (BMI) OF 50.0 TO 59.9 IN ADULT (HCC): ICD-10-CM

## 2024-03-13 DIAGNOSIS — E78.2 MIXED HYPERLIPIDEMIA: ICD-10-CM

## 2024-03-13 DIAGNOSIS — E65 ABDOMINAL OBESITY: ICD-10-CM

## 2024-03-13 DIAGNOSIS — K21.9 GASTROESOPHAGEAL REFLUX DISEASE, UNSPECIFIED WHETHER ESOPHAGITIS PRESENT: ICD-10-CM

## 2024-03-13 DIAGNOSIS — Z71.3 NUTRITIONAL COUNSELING: ICD-10-CM

## 2024-03-13 PROCEDURE — G8428 CUR MEDS NOT DOCUMENT: HCPCS | Performed by: SURGERY

## 2024-03-13 PROCEDURE — 3077F SYST BP >= 140 MM HG: CPT | Performed by: SURGERY

## 2024-03-13 PROCEDURE — 3017F COLORECTAL CA SCREEN DOC REV: CPT | Performed by: SURGERY

## 2024-03-13 PROCEDURE — 1036F TOBACCO NON-USER: CPT | Performed by: SURGERY

## 2024-03-13 PROCEDURE — 99215 OFFICE O/P EST HI 40 MIN: CPT | Performed by: SURGERY

## 2024-03-13 PROCEDURE — G8484 FLU IMMUNIZE NO ADMIN: HCPCS | Performed by: SURGERY

## 2024-03-13 PROCEDURE — G8417 CALC BMI ABV UP PARAM F/U: HCPCS | Performed by: SURGERY

## 2024-03-13 PROCEDURE — 3078F DIAST BP <80 MM HG: CPT | Performed by: SURGERY

## 2024-03-13 NOTE — PROGRESS NOTES
OBESITY MEDICINE NUTRITION REASSESSMENT     Assessment:    Pt reports good dietary compliance since last office visit. Pt is eating at least 3x/d. Pt is drinking tea Pt is exercising very little due to knee pain. Pt reports making more conscience decisions about what she is eating.   Diet Recall:              Breakfast: Eggs and apple              Lunch: Salad with chicken              Dinner: Grapes and watermelon     Intervention:   Evaluated diet recall and identified modifications.  Encouraged pt to increase lean protein intake  Encouraged continued and increased activity.    Encouraged pt to increase exercise, as able - focus on chair exercises        Monitoring and Evaluation:  Monitor for continued safe, supervised weight loss for OM.    F/U per MD Karen Hernández MBA, Nutritionist, RD eligible   
300 minutes a week.    7. Behavior Recommendations: increase physical activity        Scripts: Metformin 500 mg BID    FU 1 months - RD + MD        Time: I spent 40 minutes preparing to see patient (including chart review and preparation), obtaining and/or reviewing additional medical history, performing a physical exam and evaluation, documenting clinical information in the electronic health record, independently interpreting results, communicating results to patient, family or caregiver, and/or coordinating care.        Signed: Lizbeth Zurita MD  Bariatric & Minimally Invasive Surgery  3/13/2024

## 2024-04-10 ENCOUNTER — OFFICE VISIT (OUTPATIENT)
Dept: ORTHOPEDIC SURGERY | Age: 64
End: 2024-04-10
Payer: MEDICARE

## 2024-04-10 VITALS — WEIGHT: 293 LBS | BODY MASS INDEX: 56.58 KG/M2

## 2024-04-10 DIAGNOSIS — M17.11 PRIMARY OSTEOARTHRITIS OF RIGHT KNEE: ICD-10-CM

## 2024-04-10 DIAGNOSIS — M17.12 PRIMARY OSTEOARTHRITIS OF LEFT KNEE: Primary | ICD-10-CM

## 2024-04-10 PROCEDURE — G8427 DOCREV CUR MEDS BY ELIG CLIN: HCPCS | Performed by: PHYSICIAN ASSISTANT

## 2024-04-10 PROCEDURE — 3017F COLORECTAL CA SCREEN DOC REV: CPT | Performed by: PHYSICIAN ASSISTANT

## 2024-04-10 PROCEDURE — 1036F TOBACCO NON-USER: CPT | Performed by: PHYSICIAN ASSISTANT

## 2024-04-10 PROCEDURE — 20610 DRAIN/INJ JOINT/BURSA W/O US: CPT | Performed by: PHYSICIAN ASSISTANT

## 2024-04-10 PROCEDURE — G8417 CALC BMI ABV UP PARAM F/U: HCPCS | Performed by: PHYSICIAN ASSISTANT

## 2024-04-10 PROCEDURE — 99214 OFFICE O/P EST MOD 30 MIN: CPT | Performed by: PHYSICIAN ASSISTANT

## 2024-04-10 RX ORDER — METHYLPREDNISOLONE ACETATE 40 MG/ML
80 INJECTION, SUSPENSION INTRA-ARTICULAR; INTRALESIONAL; INTRAMUSCULAR; SOFT TISSUE ONCE
Status: COMPLETED | OUTPATIENT
Start: 2024-04-10 | End: 2024-04-10

## 2024-04-10 RX ADMIN — METHYLPREDNISOLONE ACETATE 80 MG: 40 INJECTION, SUSPENSION INTRA-ARTICULAR; INTRALESIONAL; INTRAMUSCULAR; SOFT TISSUE at 12:53

## 2024-04-10 NOTE — PROGRESS NOTES
of xylocaine with a 25 gauge needle, the needle was left in place a total of 2cc of 40mg methylprednisolone and 5 cc of Lidocaine was slowly injected. The patient tolerated the procedure well. The injection areas were cleaned and Band-Aid applied. No excessive bleeding was noted. Patient dressed and was discharged to home with instructions.    Discussion:  The patient tolerated the procedure well.     This patient's clinical history and physical exam is consistent with osteoarthritis of the bilateral  knee(s). We discussed the natural history of this condition as a degenerative process that causes inflammation of the joints due to a breakdown of cartilage, the hard, thick tissue that cushions the joints. We discussed that osteoarthritis never completely resolves on its own and symptoms including pain, stiffness, and swelling may wax and wane as the condition progresses. She understands that conservative treatments typically include activity modification, NSAIDs and physical therapy.  Oral and/or steroid injections into the joint could be considered in resistant scenarios. Viscosupplementation injections may also be useful as a temporary cartilage replacement in certain patients. I advised to avoid any prolonged bedrest and to try to maintain ADLs as much as possible. The patient was counseled to follow up with me should she develop any worsening symptoms that begin to limit activities of daily living.     we will continue to monitor her and inject her knees periodically as needed.    ----The patient will be treated observantly with self directed symptomatic care. Instructions were given to follow up if there is any neurologic or functional decline.  ----The patient tolerated cortisone injection well today.  ---- A home exercise program was prescribed for stretching and strengthening. A list of exercises was provided.   ----Weight Loss: We discussed that pain and problems in the hip and/or knee may be aggravated by

## 2024-04-11 ENCOUNTER — TELEPHONE (OUTPATIENT)
Dept: ORTHOPEDIC SURGERY | Age: 64
End: 2024-04-11

## 2024-04-11 NOTE — TELEPHONE ENCOUNTER
Please  call pt  with the  [facility]  facility and  phone  number  where she  needs to  call for  her  custom  brace

## 2024-04-21 DIAGNOSIS — M17.12 PRIMARY OSTEOARTHRITIS OF LEFT KNEE: ICD-10-CM

## 2024-04-22 RX ORDER — IBUPROFEN 800 MG/1
800 TABLET ORAL EVERY 6 HOURS PRN
Qty: 120 TABLET | Refills: 1 | OUTPATIENT
Start: 2024-04-22

## 2024-04-23 DIAGNOSIS — M17.12 PRIMARY OSTEOARTHRITIS OF LEFT KNEE: Primary | ICD-10-CM

## 2024-04-23 DIAGNOSIS — M17.11 PRIMARY OSTEOARTHRITIS OF RIGHT KNEE: ICD-10-CM

## 2024-04-23 RX ORDER — IBUPROFEN 800 MG/1
800 TABLET ORAL EVERY 6 HOURS PRN
Qty: 270 TABLET | Refills: 1 | Status: SHIPPED | OUTPATIENT
Start: 2024-04-23

## 2024-04-23 NOTE — TELEPHONE ENCOUNTER
She left a message on [facility] requesting a refill on ibuprofen 800mg to [facility] on [address].

## 2024-04-24 ENCOUNTER — OFFICE VISIT (OUTPATIENT)
Dept: SURGERY | Age: 64
End: 2024-04-24
Payer: MEDICARE

## 2024-04-24 VITALS
HEART RATE: 110 BPM | BODY MASS INDEX: 48.82 KG/M2 | HEIGHT: 65 IN | SYSTOLIC BLOOD PRESSURE: 135 MMHG | WEIGHT: 293 LBS | DIASTOLIC BLOOD PRESSURE: 86 MMHG

## 2024-04-24 DIAGNOSIS — E65 ABDOMINAL OBESITY: ICD-10-CM

## 2024-04-24 DIAGNOSIS — Z71.3 NUTRITIONAL COUNSELING: ICD-10-CM

## 2024-04-24 DIAGNOSIS — G47.33 OSA (OBSTRUCTIVE SLEEP APNEA): Primary | ICD-10-CM

## 2024-04-24 DIAGNOSIS — E66.01 CLASS 3 SEVERE OBESITY DUE TO EXCESS CALORIES WITH SERIOUS COMORBIDITY AND BODY MASS INDEX (BMI) OF 50.0 TO 59.9 IN ADULT (HCC): ICD-10-CM

## 2024-04-24 DIAGNOSIS — K21.9 GASTROESOPHAGEAL REFLUX DISEASE, UNSPECIFIED WHETHER ESOPHAGITIS PRESENT: ICD-10-CM

## 2024-04-24 DIAGNOSIS — E78.2 MIXED HYPERLIPIDEMIA: ICD-10-CM

## 2024-04-24 DIAGNOSIS — I10 ESSENTIAL HYPERTENSION: ICD-10-CM

## 2024-04-24 PROCEDURE — G8417 CALC BMI ABV UP PARAM F/U: HCPCS | Performed by: SURGERY

## 2024-04-24 PROCEDURE — 1036F TOBACCO NON-USER: CPT | Performed by: SURGERY

## 2024-04-24 PROCEDURE — 3075F SYST BP GE 130 - 139MM HG: CPT | Performed by: SURGERY

## 2024-04-24 PROCEDURE — 3079F DIAST BP 80-89 MM HG: CPT | Performed by: SURGERY

## 2024-04-24 PROCEDURE — 3017F COLORECTAL CA SCREEN DOC REV: CPT | Performed by: SURGERY

## 2024-04-24 PROCEDURE — G8427 DOCREV CUR MEDS BY ELIG CLIN: HCPCS | Performed by: SURGERY

## 2024-04-24 PROCEDURE — 99215 OFFICE O/P EST HI 40 MIN: CPT | Performed by: SURGERY

## 2024-04-24 NOTE — PROGRESS NOTES
OBESITY MEDICINE NUTRITION REASSESSMENT     Assessment:    Pt reports good dietary compliance since last office visit. Pt is eating at least 3x/d. Pt is drinking water and OJ. Pt is exercising via chair exercises.   Diet Recall:              Breakfast: Turkey hendricks and egg              Lunch: Fish              Dinner: Mozzarella slices, watermelon, grapes     Intervention:   Evaluated diet recall and identified modifications.  Encouraged pt to slightly increase protein intake  Encouraged continued and increased activity - as able     Monitoring and Evaluation:  Monitor for continued safe, supervised weight loss for OM.    F/U per MD Karen Hernández MBA, Nutritionist, RD eligible   
       MEDICATIONS:  Current Outpatient Medications   Medication Sig Dispense Refill    ibuprofen (ADVIL;MOTRIN) 800 MG tablet Take 1 tablet by mouth every 6 hours as needed for Pain 270 tablet 1    diclofenac sodium (VOLTAREN) 1 % GEL Apply 2 g topically 4 times daily 150 g 2    metFORMIN (GLUCOPHAGE) 500 MG tablet Take 1 tablet by mouth 2 times daily (with meals) 60 tablet 0    omeprazole (PRILOSEC) 40 MG delayed release capsule Take 1 capsule by mouth daily      furosemide (LASIX) 20 MG tablet Take 1 tablet by mouth 2 times daily      buPROPion (WELLBUTRIN) 75 MG tablet Take 1 tablet by mouth 2 times daily      folic acid (FOLVITE) 1 MG tablet Take 1 tablet by mouth daily      Icosapent Ethyl (VASCEPA) 1 g CAPS capsule Take 2 capsules by mouth 2 times daily      sertraline (ZOLOFT) 100 MG tablet Take 1 tablet by mouth daily      amLODIPine (NORVASC) 10 MG tablet Take 1 tablet by mouth daily      NONFORMULARY Iron - patient unsure of dosage      diclofenac sodium (VOLTAREN) 1 % GEL Apply 4 g topically 4 times daily as needed for Pain 150 g 2    metFORMIN (GLUCOPHAGE) 500 MG tablet Take 1 tablet by mouth daily (with breakfast) for 15 days, THEN 1 tablet 2 times daily (with meals) for 15 days. 45 tablet 0     No current facility-administered medications for this visit.        ALLERGIES:  Allergies   Allergen Reactions    Hydrocodone Itching    Other Nausea Only and Nausea And Vomiting     \"laughing gas causes nausea\"       ROS: The patient has no difficulty with chest pain or shortness of breath.  No fever or chills.  Comprehensive 13 point review of systems was otherwise unremarkable except as noted above.    Physical Exam:     /86   Pulse (!) 110   Ht 1.651 m (5' 5\")   Wt (!) 151.5 kg (334 lb)   BMI 55.58 kg/m²       General:  Well-developed, well-nourished, no distress.  Psych:  Cooperative, good insight and judgement.  Neuro:  Alert, oriented to person, place and time.  HEENT:  Normocephalic,

## 2024-07-10 ENCOUNTER — OFFICE VISIT (OUTPATIENT)
Dept: ORTHOPEDIC SURGERY | Age: 64
End: 2024-07-10
Payer: MEDICARE

## 2024-07-10 VITALS — HEIGHT: 65 IN | BODY MASS INDEX: 48.82 KG/M2 | WEIGHT: 293 LBS

## 2024-07-10 DIAGNOSIS — M17.11 PRIMARY OSTEOARTHRITIS OF RIGHT KNEE: ICD-10-CM

## 2024-07-10 DIAGNOSIS — M17.12 PRIMARY OSTEOARTHRITIS OF LEFT KNEE: Primary | ICD-10-CM

## 2024-07-10 PROCEDURE — G8427 DOCREV CUR MEDS BY ELIG CLIN: HCPCS | Performed by: PHYSICIAN ASSISTANT

## 2024-07-10 PROCEDURE — 3017F COLORECTAL CA SCREEN DOC REV: CPT | Performed by: PHYSICIAN ASSISTANT

## 2024-07-10 PROCEDURE — 99213 OFFICE O/P EST LOW 20 MIN: CPT | Performed by: PHYSICIAN ASSISTANT

## 2024-07-10 PROCEDURE — 1036F TOBACCO NON-USER: CPT | Performed by: PHYSICIAN ASSISTANT

## 2024-07-10 PROCEDURE — G8417 CALC BMI ABV UP PARAM F/U: HCPCS | Performed by: PHYSICIAN ASSISTANT

## 2024-07-10 PROCEDURE — 20610 DRAIN/INJ JOINT/BURSA W/O US: CPT | Performed by: PHYSICIAN ASSISTANT

## 2024-07-10 RX ORDER — METHYLPREDNISOLONE ACETATE 40 MG/ML
80 INJECTION, SUSPENSION INTRA-ARTICULAR; INTRALESIONAL; INTRAMUSCULAR; SOFT TISSUE ONCE
Status: COMPLETED | OUTPATIENT
Start: 2024-07-10 | End: 2024-07-10

## 2024-07-10 RX ADMIN — METHYLPREDNISOLONE ACETATE 80 MG: 40 INJECTION, SUSPENSION INTRA-ARTICULAR; INTRALESIONAL; INTRAMUSCULAR; SOFT TISSUE at 11:36

## 2024-07-10 NOTE — PROGRESS NOTES
Name: Bindu Garduno  YOB: 1960  Gender: female  MRN: [id number]     CC: Bilateral knee pain and stiffness     HPI:  This patient presents with a long history of bilateral knee pain secondary to osteoarthritis.  She also notes increased swelling and instability of the right knee.  She has had fluid aspirated from this knee in the past.  The pain has become quite limiting as she has frequent falls and often uses her wheelchair or walker to ambulate  She has had a series of viscosupplementation injections without much improvement.  She does get temporary relief with cortisone injections.  She was previously referred to bariatric clinic for weight loss, and has started on metformin for weight loss.   She did previously see Dr. Garvin for evaluation of knee replacement surgery and was told that she could have the surgery after weight loss. She has been working very hard on this and has made dietary changes as well as starting water aerobics. She has done very well with this and has lost more weight since her last office visit.     Procedure Note    Patient Name: Bindu Garduno    Date of Procedure: July 10, 2024    Preoperative Diagnosis:     ICD-10-CM    1. Primary osteoarthritis of left knee  M17.12 methylPREDNISolone acetate (DEPO-MEDROL) injection 80 mg     CA ARTHROCENTESIS ASPIR&/INJ MAJOR JT/BURSA W/O US     Ambulatory referral to Physical Therapy      2. Primary osteoarthritis of right knee  M17.11 methylPREDNISolone acetate (DEPO-MEDROL) injection 80 mg     CA ARTHROCENTESIS ASPIR&/INJ MAJOR JT/BURSA W/O US     Ambulatory referral to Physical Therapy          Post Operative Diagnosis: same    Procedure: bilateral knees joint Injection    Consent: The patient was given the opportunity to ask questions regarding the procedure and its associated risks.     Procedure: The patient was placed in a sitting position on the exam table and both knees were prepped in the usual sterile manner. Each knee

## 2024-07-19 NOTE — PROGRESS NOTES
Lizbeth Zurita MD   Bariatric & Advanced Laparoscopic Surgery & Endoscopy  [address], [address]  [place]  [address]                          Office [phone] Fax [phone]        Date of visit: 7/24/2024      History and Physical    Patient: Bindu Garduno MRN: [id number]     YOB: 1960  Age: 63 y.o.  Sex: female              PCP: Allison Loja MD   Pharmacy:   [facility]/pharmacy #[id number] - [place], [place] - [address] - P [phone] - F [phone]  [address] [facility] [address]  Phone: [phone] Fax: [phone]     Date:  7/24/2024        2 month(s) post medication start  She has lost,  12 lbs. since her last office visit.    How is patient taking medication? Metformin 1000 mg daily         She has been doing well. She reports no changes in appetite.     Clinical Assessment and Physical Exam:    she is doing very well today and is feeling good. She reports that she has been adhering to the protein first diet without difficulty.  She denies any tachycardia, abdominal pain, nausea or vomiting.  She does not report having problems with constipation.     Protein:  ?45 grams per day  Fluids:    60 ounces per day  Physical Activity:  chair exercises    Initial Consult Date 1/25/24   Initial Weight 344   Ideal Body Weight 150   Initial BMI 57.24   Initial Neck Circumference 17   Initial Waist Circumference 54   Initial Hip Circumference 68   Initial BF% Unable to obtain       Medication See history below   Medication Start Date 2/22/2024       Today's Weight (!) 146.1 kg (322 lb)   Today's BMI Body mass index is 53.58 kg/m².    Today's Neck Circumference 20   Today's Waist 55   Today's Hip 68   Today's BF% 61.5       Evaluation of Co-morbid Conditions  Sleep Apnea Yes, uses CPAP- No   Diabetes No, insulin dependent- NA   Hypertension Yes, number of medications- 1   GERD Yes, treatment medication- omeprazole   Hyperlipidemia Yes

## 2024-07-22 ENCOUNTER — APPOINTMENT (OUTPATIENT)
Dept: PHYSICAL THERAPY | Age: 64
End: 2024-07-22
Payer: MEDICARE

## 2024-07-24 ENCOUNTER — OFFICE VISIT (OUTPATIENT)
Dept: SURGERY | Age: 64
End: 2024-07-24
Payer: MEDICARE

## 2024-07-24 VITALS
WEIGHT: 293 LBS | DIASTOLIC BLOOD PRESSURE: 90 MMHG | HEART RATE: 99 BPM | HEIGHT: 65 IN | SYSTOLIC BLOOD PRESSURE: 136 MMHG | BODY MASS INDEX: 48.82 KG/M2

## 2024-07-24 DIAGNOSIS — Z71.3 NUTRITIONAL COUNSELING: ICD-10-CM

## 2024-07-24 DIAGNOSIS — E65 ABDOMINAL OBESITY: ICD-10-CM

## 2024-07-24 DIAGNOSIS — E66.01 CLASS 3 SEVERE OBESITY DUE TO EXCESS CALORIES WITH SERIOUS COMORBIDITY AND BODY MASS INDEX (BMI) OF 50.0 TO 59.9 IN ADULT (HCC): ICD-10-CM

## 2024-07-24 DIAGNOSIS — G47.33 OSA (OBSTRUCTIVE SLEEP APNEA): Primary | ICD-10-CM

## 2024-07-24 DIAGNOSIS — I10 ESSENTIAL HYPERTENSION: ICD-10-CM

## 2024-07-24 DIAGNOSIS — E78.2 MIXED HYPERLIPIDEMIA: ICD-10-CM

## 2024-07-24 PROCEDURE — 1036F TOBACCO NON-USER: CPT | Performed by: SURGERY

## 2024-07-24 PROCEDURE — 3080F DIAST BP >= 90 MM HG: CPT | Performed by: SURGERY

## 2024-07-24 PROCEDURE — G8417 CALC BMI ABV UP PARAM F/U: HCPCS | Performed by: SURGERY

## 2024-07-24 PROCEDURE — 99215 OFFICE O/P EST HI 40 MIN: CPT | Performed by: SURGERY

## 2024-07-24 PROCEDURE — 3075F SYST BP GE 130 - 139MM HG: CPT | Performed by: SURGERY

## 2024-07-24 PROCEDURE — 3017F COLORECTAL CA SCREEN DOC REV: CPT | Performed by: SURGERY

## 2024-07-24 PROCEDURE — G8428 CUR MEDS NOT DOCUMENT: HCPCS | Performed by: SURGERY

## 2024-08-02 ENCOUNTER — HOSPITAL ENCOUNTER (OUTPATIENT)
Dept: PHYSICAL THERAPY | Age: 64
Setting detail: RECURRING SERIES
Discharge: HOME | End: 2024-08-05
Payer: MEDICARE

## 2024-08-02 DIAGNOSIS — R26.2 DIFFICULTY IN WALKING, NOT ELSEWHERE CLASSIFIED: ICD-10-CM

## 2024-08-02 DIAGNOSIS — M25.562 CHRONIC PAIN OF LEFT KNEE: ICD-10-CM

## 2024-08-02 DIAGNOSIS — M25.561 CHRONIC PAIN OF RIGHT KNEE: Primary | ICD-10-CM

## 2024-08-02 DIAGNOSIS — G89.29 CHRONIC PAIN OF RIGHT KNEE: Primary | ICD-10-CM

## 2024-08-02 DIAGNOSIS — G89.29 CHRONIC PAIN OF LEFT KNEE: ICD-10-CM

## 2024-08-02 PROCEDURE — 97162 PT EVAL MOD COMPLEX 30 MIN: CPT

## 2024-08-02 PROCEDURE — 97110 THERAPEUTIC EXERCISES: CPT

## 2024-08-02 NOTE — THERAPY EVALUATION
Bindu Garduno  : 1960  Primary: [employer] (Medicare Managed)  Secondary:  [facility] [address]  [place] [address]  Phone: [phone]  Fax: [phone] Plan Frequency: 2/week x 12 weeks    Plan of Care/Certification Expiration Date: 10/11/24        Plan of Care/Certification Expiration Date:  Plan of Care/Certification Expiration Date: 10/11/24    Frequency/Duration: Plan Frequency: 2/week x 12 weeks      Time In/Out:   Time In: 1115  Time Out: 1200      PT Visit Info:    Total # of Visits to Date: 1      Visit Count:  1                OUTPATIENT PHYSICAL THERAPY:             Initial Assessment 2024               Episode (B knee pain)         Treatment Diagnosis:     Chronic pain of right knee  Chronic pain of left knee  Difficulty in walking, not elsewhere classified  Medical/Referring Diagnosis:    Primary osteoarthritis of left knee  Primary osteoarthritis of right knee      Referring Physician:  Miriam Herrera PA MD Orders:  PT Eval and Treat   Return MD Appt:  next week  Date of Onset:  Onset Date: 21   chronic 3 yr history   Allergies:  Hydrocodone and Other  Restrictions/Precautions:    Fall Precautions: uses rolling walker      Medications Last Reviewed:  2024     SUBJECTIVE   History of Injury/Illness (Reason for Referral):  Pt has 3 yr history of B knee pain. She states both are bone on bone and she needs knee replacement but she is overweight.She is being evaluated for bariatric surgery She has done aquatic therapy 3 yrs ago or so and she had good results but did not continue.  B knee pain with sharp pains in medial area of both knees.  She recently had cortisone injections and the pain is decreased. She would like to improve her mobility and be safer as she had a fall last week at [employer].    Patient Stated Goal(s):  \"to walk better\"  Initial Pain Level:      3 /10 now  rates it 9/10 before

## 2024-08-02 NOTE — PROGRESS NOTES
Binduminda Garduno  : 1960  Primary: [employer] (Medicare Managed)  Secondary:  [facility] [place] [address]  Phone: [phone]  Fax: [phone] Plan Frequency: 2/week x 12 weeks    Plan of Care/Certification Expiration Date: 10/11/24        Plan of Care/Certification Expiration Date:  Plan of Care/Certification Expiration Date: 10/11/24    Frequency/Duration:   Plan Frequency: 2/week x 12 weeks      Time In/Out:   Time In: 1115  Time Out: 1200      PT Visit Info:    Total # of Visits to Date: 1      Visit Count:  1    OUTPATIENT PHYSICAL THERAPY:   Treatment Note 2024       Episode  (B knee pain)               Treatment Diagnosis:    Chronic pain of right knee  Chronic pain of left knee  Difficulty in walking, not elsewhere classified  Medical/Referring Diagnosis:    Primary osteoarthritis of left knee  Primary osteoarthritis of right knee      Referring Physician:  Miriam Herrera PA MD Orders:  PT Eval and Treat   Return MD Appt:  next week   Date of Onset:  Onset Date: 21     Allergies:   Hydrocodone and Other  Restrictions/Precautions:   Fall Precautions: used rolling walker had recent fall      Interventions Planned (Treatment may consist of any combination of the following):  Current Treatment Recommendations: Strengthening; ROM; Home exercise program; Aquatics    See Assessment Note    Subjective Comments:   See eval  Initial Pain Level::   3   /10  Post Session Pain Level:     3   /10  Medications Last Reviewed:  2024  Updated Objective Findings:  See Evaluation Note from today  Treatment   THERAPEUTIC EXERCISE: (15 minutes):    Exercises per grid below to improve mobility and strength.  Required minimal visual and verbal cues to promote proper body alignment and promote proper body posture.  Progressed range and repetitions as indicated.   Date:  24 Date:   Date:     Activity/Exercise Parameters

## 2024-08-05 ENCOUNTER — APPOINTMENT (OUTPATIENT)
Dept: PHYSICAL THERAPY | Age: 64
End: 2024-08-05
Payer: MEDICARE

## 2024-08-07 NOTE — PROGRESS NOTES
Lydia Smith PA-C  Bariatric & Advanced Laparoscopic Surgery & Endoscopy      Name: Bindu Garduno      MRN: [id number]       : 1960       Sex: female  PCP: Allison Ljoa MD     CC:  Bariatric monthly dietary follow up  Surgeon: Dr. Lizbeth Zurita  Procedure: laparoscopic gastric bypass, hiatal hernia repair  Surgical Consult Date: 2024    The patient is a 63 y.o. female presenting with a height of 1.651 m (5' 5\") and weight of (!) 146.5 kg (323 lb), giving her a Body mass index is 53.75 kg/m².  She has an ideal body weight of 150 lbs, and excess body weight of 173 lbs.   She started our program with a weight of 323 lbs, remained weight stable.    Subjective   She is in the process of completing all aspects of our prep program and to be deemed an acceptable candidate for bariatric surgery.    Patient has a long term history of obesity with multiple failed attempts at weight reduction.  Patient denies any changes in health history or physical health since last visit.  Patient has been adherent to her diet and exercise regimen.  Patient feels that she is well informed regarding her bariatric surgery choice and would like to proceed with a laparoscopic yakov-en-y gastric bypass for weight reduction, improvement of her medical conditions, and improved quality of life. Patient verbalized understanding of the risks associated with bariatric surgery. Patient also verbalized understanding that bariatric surgery is a tool and that weight reduction is dependent on behavioral changes in regards to what she eats and how much.       Objective   PRE-OPERATIVE TESTING:   PSYCHOLOGICAL EVALUATION:  Not completed  DIETITIAN EVALUATION:  In progress with Karen Hernández RD, LD  BARIATRIC LABS:  Not completed  UPPER GI:  Not completed  PHYSICAL THERAPY EVALUATION:  Not completed  CARDIAC CLEARANCE:  Not completed  PULMONARY CLEARANCE:  Not completed    MEDICAL HISTORY:  Morbid

## 2024-08-08 ENCOUNTER — OFFICE VISIT (OUTPATIENT)
Dept: SURGERY | Age: 64
End: 2024-08-08

## 2024-08-08 VITALS
DIASTOLIC BLOOD PRESSURE: 99 MMHG | BODY MASS INDEX: 48.82 KG/M2 | HEIGHT: 65 IN | WEIGHT: 293 LBS | HEART RATE: 109 BPM | SYSTOLIC BLOOD PRESSURE: 146 MMHG

## 2024-08-08 DIAGNOSIS — Z71.3 DIETARY COUNSELING: ICD-10-CM

## 2024-08-08 DIAGNOSIS — E66.01 MORBID OBESITY WITH BMI OF 50.0-59.9, ADULT (HCC): ICD-10-CM

## 2024-08-08 DIAGNOSIS — E66.01 MORBID OBESITY (HCC): Primary | ICD-10-CM

## 2024-08-08 DIAGNOSIS — Z71.89 ENCOUNTER FOR PRE-BARIATRIC SURGERY COUNSELING AND EDUCATION: ICD-10-CM

## 2024-08-08 DIAGNOSIS — I10 ESSENTIAL HYPERTENSION: ICD-10-CM

## 2024-08-08 DIAGNOSIS — E66.01 OBESITY, MORBID, BMI 50 OR HIGHER (HCC): ICD-10-CM

## 2024-08-08 DIAGNOSIS — K21.9 GASTROESOPHAGEAL REFLUX DISEASE, UNSPECIFIED WHETHER ESOPHAGITIS PRESENT: ICD-10-CM

## 2024-08-08 DIAGNOSIS — K44.9 PARAESOPHAGEAL HERNIA: ICD-10-CM

## 2024-08-08 DIAGNOSIS — G47.33 OSA (OBSTRUCTIVE SLEEP APNEA): ICD-10-CM

## 2024-08-08 DIAGNOSIS — Z01.812 ENCOUNTER FOR PRE-OPERATIVE LABORATORY TESTING: ICD-10-CM

## 2024-08-08 DIAGNOSIS — Z13.21 ENCOUNTER FOR VITAMIN DEFICIENCY SCREENING: ICD-10-CM

## 2024-08-08 DIAGNOSIS — Z86.39 HX OF OBESITY: ICD-10-CM

## 2024-08-08 DIAGNOSIS — E78.2 MIXED HYPERLIPIDEMIA: ICD-10-CM

## 2024-08-08 DIAGNOSIS — Z71.82 EXERCISE COUNSELING: ICD-10-CM

## 2024-08-08 PROBLEM — G89.29 CHRONIC BACK PAIN: Status: ACTIVE | Noted: 2021-04-15

## 2024-08-08 PROBLEM — M47.816 SPONDYLOSIS OF LUMBAR REGION WITHOUT MYELOPATHY OR RADICULOPATHY: Status: ACTIVE | Noted: 2020-06-15

## 2024-08-08 PROBLEM — Z90.49 HISTORY OF CHOLECYSTECTOMY: Status: ACTIVE | Noted: 2019-12-06

## 2024-08-08 PROBLEM — M25.559 CHRONIC HIP PAIN: Status: ACTIVE | Noted: 2021-04-15

## 2024-08-08 PROBLEM — K22.2 SCHATZKI'S RING: Status: ACTIVE | Noted: 2022-02-09

## 2024-08-08 PROBLEM — E53.8 FOLIC ACID DEFICIENCY: Status: ACTIVE | Noted: 2020-03-02

## 2024-08-08 PROBLEM — Z90.710 H/O: HYSTERECTOMY: Status: ACTIVE | Noted: 2019-12-06

## 2024-08-08 PROBLEM — R60.0 PERIPHERAL EDEMA: Status: ACTIVE | Noted: 2023-07-25

## 2024-08-08 PROBLEM — K13.0 ANGULAR CHEILITIS: Status: ACTIVE | Noted: 2019-12-06

## 2024-08-08 PROBLEM — G89.29 CHRONIC HIP PAIN: Status: ACTIVE | Noted: 2021-04-15

## 2024-08-08 PROBLEM — M71.20 BAKER CYST: Status: ACTIVE | Noted: 2020-07-01

## 2024-08-08 PROBLEM — G44.219 EPISODIC TENSION-TYPE HEADACHE, NOT INTRACTABLE: Status: ACTIVE | Noted: 2023-08-08

## 2024-08-08 PROBLEM — K57.90 DIVERTICULOSIS: Status: ACTIVE | Noted: 2023-10-24

## 2024-08-08 PROBLEM — R42 VERTIGO: Status: ACTIVE | Noted: 2019-07-18

## 2024-08-08 PROBLEM — M19.90 ARTHRITIS: Status: ACTIVE | Noted: 2022-04-20

## 2024-08-08 PROBLEM — E55.9 VITAMIN D DEFICIENCY: Status: ACTIVE | Noted: 2023-01-23

## 2024-08-08 PROBLEM — L30.4 INTERTRIGO: Status: ACTIVE | Noted: 2021-11-11

## 2024-08-08 PROBLEM — R13.12 OROPHARYNGEAL DYSPHAGIA: Status: ACTIVE | Noted: 2022-02-02

## 2024-08-08 PROBLEM — M54.9 CHRONIC BACK PAIN: Status: ACTIVE | Noted: 2021-04-15

## 2024-08-08 PROBLEM — E27.8 MASS OF LEFT ADRENAL GLAND (HCC): Status: ACTIVE | Noted: 2023-10-24

## 2024-08-08 PROBLEM — D50.9 IRON DEFICIENCY ANEMIA: Status: ACTIVE | Noted: 2019-12-13

## 2024-08-08 RX ORDER — FERROUS SULFATE 325(65) MG
1 TABLET ORAL DAILY
COMMUNITY
Start: 2024-05-14

## 2024-08-08 RX ORDER — LOVASTATIN 40 MG/1
80 TABLET ORAL
COMMUNITY
Start: 2024-06-27

## 2024-08-08 RX ORDER — POTASSIUM CHLORIDE 750 MG/1
10 TABLET, FILM COATED, EXTENDED RELEASE ORAL DAILY
COMMUNITY
Start: 2024-05-13

## 2024-08-08 RX ORDER — CLOTRIMAZOLE AND BETAMETHASONE DIPROPIONATE 10; .64 MG/G; MG/G
CREAM TOPICAL
COMMUNITY
Start: 2024-05-21

## 2024-08-08 RX ORDER — KETOCONAZOLE 20 MG/G
CREAM TOPICAL 2 TIMES DAILY
COMMUNITY
Start: 2024-05-13

## 2024-08-08 RX ORDER — LEVOTHYROXINE SODIUM 0.03 MG/1
25 TABLET ORAL DAILY
COMMUNITY
Start: 2024-05-13

## 2024-08-08 NOTE — PROGRESS NOTES
[facility] INITIAL ASSESSMENT    Nutrition Assessment:  Anthropometrics:    Ht: 5’5”, wt: 322#, 215% IBW, 53.58 BMI    Macronutrient needs assessment   EER: 1178-7102 kcal/d (14-16 kcal/kg ABW)    MSJ x 1.3-500 = 2120 kcal/d (sedentary AF)   EPR: 68-102g pro (1.0-1.5 gm pro/kg IBW)   CHO: ~265 g CHO/d (50% EER, ~5.5 servings CHO/meal)   H2O: 1mL/kcal or per MD recommendations     Eating Habits:   Eating occasions/d: 2 x daily   Main cook at home: Pts  is main cook   Restaurant/Fast Food Intake: 2 x per week   Typical Beverage Consumption: water and apple juice (16 oz)  Food Allergies: None  Cultural Preferences: None  Diet Recall:   Breakfast: Eggs and apple  Lunch: Tuna and boiled egg   Dinner: none     Lifestyle Assessment:               Hours of sleep/night: ~5-7 hrs               Current Occupation: None               Number of people living in house and influence: 2 people including  and pt - reports  to sometimes be a fair influence.      Exercise Assessment:   PAR-Q: No contraindications to exercise   Medical:                           Pain or injuries (present): Both knees and back                           Past surgeries related to mobility: None   Exercise equipment at home: None               Gym Membership: [employer]   Current Exercise Routine: leg lifts   Assessment Exercise Goal: continue current exercise and increase as able  Nutrition Diagnosis:  Morbid obesity R/T excessive energy intake and yoyo dieting as evidenced by BMI = 53.58 and 215 % IBW.      Nutrition Intervention:   Diet Rx - Low-moderate calorie intake (~1950 kcal/day).  Work on eating 3 meals/d and meal balance.   Modify distribution, type or amount of food and nutrients within meals or at a specified time-    Discussed need for 3 meals per day and snacks. Explained macronutrients and emphasized mindful eating behaviors.  Discussed meal priority and encouraged practice.   Discussed effect of caffeine, carbonated, and

## 2024-08-09 ENCOUNTER — HOSPITAL ENCOUNTER (OUTPATIENT)
Dept: PHYSICAL THERAPY | Age: 64
Setting detail: RECURRING SERIES
Discharge: HOME | End: 2024-08-12
Payer: MEDICARE

## 2024-08-09 PROCEDURE — 97116 GAIT TRAINING THERAPY: CPT

## 2024-08-09 PROCEDURE — 97530 THERAPEUTIC ACTIVITIES: CPT

## 2024-08-09 NOTE — PROGRESS NOTES
Bindu Garduno  : 1960  Primary: [employer] (Medicare Managed)  Secondary:  [facility] [address] [place] [address]  Phone: [phone]  Fax: [phone] Plan Frequency: 2/week x 12 weeks    Plan of Care/Certification Expiration Date: 10/11/24        Plan of Care/Certification Expiration Date:  Plan of Care/Certification Expiration Date: 10/11/24    Frequency/Duration:   Plan Frequency: 2/week x 12 weeks      Time In/Out:   Time In: 1115  Time Out: 1200      PT Visit Info:    Total # of Visits to Date: 2      Visit Count:  2    OUTPATIENT PHYSICAL THERAPY:   Treatment Note 2024       Episode  (B knee pain)               Treatment Diagnosis:    No data found  Medical/Referring Diagnosis:          Referring Physician:  Miriam Herrera PA MD Orders:  PT Eval and Treat   Return MD Appt:  next week   Date of Onset:  Onset Date: 21     Allergies:   Acetaminophen-codeine, Hydrocodone-acetaminophen, Hydrocodone, and Other  Restrictions/Precautions:   Fall Precautions: used rolling walker had recent fall      Interventions Planned (Treatment may consist of any combination of the following):  Current Treatment Recommendations: Strengthening; ROM; Home exercise program; Aquatics    See Assessment Note    Subjective Comments:   I have been trying to walk more.  Initial Pain Level::   3   /10  Post Session Pain Level:     3   /10  Medications Last Reviewed:  2024  Updated Objective Findings:  None Today  Treatment   THERAPEUTIC EXERCISE: (15 minutes):    Exercises per grid below to improve mobility and strength.  Required minimal visual and verbal cues to promote proper body alignment and promote proper body posture.  Progressed range and repetitions as indicated.   Date:  24 Date:  24 Date:     Activity/Exercise Parameters Parameters Parameters   Seated knee ext 10x each 2# 2 x 10 each    Seated march 10x each 2# 2 x 10

## 2024-08-12 ENCOUNTER — HOSPITAL ENCOUNTER (OUTPATIENT)
Dept: PHYSICAL THERAPY | Age: 64
Setting detail: RECURRING SERIES
End: 2024-08-12
Payer: MEDICARE

## 2024-08-14 ENCOUNTER — HOSPITAL ENCOUNTER (OUTPATIENT)
Dept: PHYSICAL THERAPY | Age: 64
Setting detail: RECURRING SERIES
End: 2024-08-14
Payer: MEDICARE

## 2024-08-15 DIAGNOSIS — Z01.812 ENCOUNTER FOR PRE-OPERATIVE LABORATORY TESTING: ICD-10-CM

## 2024-08-15 DIAGNOSIS — Z86.39 HX OF OBESITY: ICD-10-CM

## 2024-08-15 DIAGNOSIS — Z71.89 ENCOUNTER FOR PRE-BARIATRIC SURGERY COUNSELING AND EDUCATION: ICD-10-CM

## 2024-08-15 DIAGNOSIS — E66.01 MORBID OBESITY (HCC): ICD-10-CM

## 2024-08-15 DIAGNOSIS — Z13.21 ENCOUNTER FOR VITAMIN DEFICIENCY SCREENING: ICD-10-CM

## 2024-08-15 DIAGNOSIS — E66.01 MORBID OBESITY WITH BMI OF 50.0-59.9, ADULT (HCC): ICD-10-CM

## 2024-08-15 LAB
25(OH)D3 SERPL-MCNC: 31 NG/ML (ref 30–100)
CHOLEST SERPL-MCNC: 155 MG/DL (ref 0–200)
EST. AVERAGE GLUCOSE BLD GHB EST-MCNC: 102 MG/DL
FERRITIN SERPL-MCNC: 102 NG/ML (ref 8–388)
FOLATE SERPL-MCNC: 28.1 NG/ML (ref 3.1–17.5)
HBA1C MFR BLD: 5.2 % (ref 0–5.6)
HDLC SERPL-MCNC: 69 MG/DL (ref 40–60)
HDLC SERPL: 2.3 (ref 0–5)
IRON SERPL-MCNC: 55 UG/DL (ref 35–100)
LDLC SERPL CALC-MCNC: 44 MG/DL (ref 0–100)
TRIGL SERPL-MCNC: 211 MG/DL (ref 0–150)
TSH, 3RD GENERATION: 0.87 UIU/ML (ref 0.27–4.2)
VIT B12 SERPL-MCNC: 398 PG/ML (ref 193–986)
VLDLC SERPL CALC-MCNC: 42 MG/DL (ref 6–23)

## 2024-08-16 DIAGNOSIS — M17.12 PRIMARY OSTEOARTHRITIS OF LEFT KNEE: ICD-10-CM

## 2024-08-16 DIAGNOSIS — M17.11 PRIMARY OSTEOARTHRITIS OF RIGHT KNEE: ICD-10-CM

## 2024-08-19 RX ORDER — IBUPROFEN 800 MG/1
800 TABLET, FILM COATED ORAL EVERY 6 HOURS PRN
Qty: 270 TABLET | Refills: 1 | OUTPATIENT
Start: 2024-08-19

## 2024-08-20 ENCOUNTER — HOSPITAL ENCOUNTER (OUTPATIENT)
Dept: PHYSICAL THERAPY | Age: 64
Setting detail: RECURRING SERIES
End: 2024-08-20
Payer: MEDICARE

## 2024-08-26 ENCOUNTER — APPOINTMENT (OUTPATIENT)
Dept: PHYSICAL THERAPY | Age: 64
End: 2024-08-26
Payer: MEDICARE

## 2024-08-26 LAB
COTININE SERPL-MCNC: <1 NG/ML
NICOTINE SERPL-MCNC: <1 NG/ML

## 2024-08-28 ENCOUNTER — APPOINTMENT (OUTPATIENT)
Dept: PHYSICAL THERAPY | Age: 64
End: 2024-08-28
Payer: MEDICARE

## 2024-09-03 DIAGNOSIS — M17.12 PRIMARY OSTEOARTHRITIS OF LEFT KNEE: ICD-10-CM

## 2024-09-03 DIAGNOSIS — M17.11 PRIMARY OSTEOARTHRITIS OF RIGHT KNEE: ICD-10-CM

## 2024-09-03 RX ORDER — IBUPROFEN 800 MG/1
800 TABLET, FILM COATED ORAL EVERY 6 HOURS PRN
Qty: 270 TABLET | Refills: 1 | OUTPATIENT
Start: 2024-09-03

## 2024-09-10 ENCOUNTER — INITIAL CONSULT (OUTPATIENT)
Age: 64
End: 2024-09-10
Payer: MEDICARE

## 2024-09-10 VITALS
WEIGHT: 293 LBS | DIASTOLIC BLOOD PRESSURE: 80 MMHG | BODY MASS INDEX: 48.82 KG/M2 | HEART RATE: 80 BPM | SYSTOLIC BLOOD PRESSURE: 130 MMHG | HEIGHT: 65 IN

## 2024-09-10 DIAGNOSIS — I45.10 RBBB: ICD-10-CM

## 2024-09-10 DIAGNOSIS — Z01.818 PRE-OP EVALUATION: ICD-10-CM

## 2024-09-10 DIAGNOSIS — Z01.818 PRE-OP EVALUATION: Primary | ICD-10-CM

## 2024-09-10 LAB — NT PRO BNP: 71 PG/ML (ref 0–125)

## 2024-09-10 PROCEDURE — 3075F SYST BP GE 130 - 139MM HG: CPT | Performed by: INTERNAL MEDICINE

## 2024-09-10 PROCEDURE — 3017F COLORECTAL CA SCREEN DOC REV: CPT | Performed by: INTERNAL MEDICINE

## 2024-09-10 PROCEDURE — 99203 OFFICE O/P NEW LOW 30 MIN: CPT | Performed by: INTERNAL MEDICINE

## 2024-09-10 PROCEDURE — 93000 ELECTROCARDIOGRAM COMPLETE: CPT | Performed by: INTERNAL MEDICINE

## 2024-09-10 PROCEDURE — 3079F DIAST BP 80-89 MM HG: CPT | Performed by: INTERNAL MEDICINE

## 2024-09-10 PROCEDURE — G8428 CUR MEDS NOT DOCUMENT: HCPCS | Performed by: INTERNAL MEDICINE

## 2024-09-10 PROCEDURE — 1036F TOBACCO NON-USER: CPT | Performed by: INTERNAL MEDICINE

## 2024-09-10 PROCEDURE — G8417 CALC BMI ABV UP PARAM F/U: HCPCS | Performed by: INTERNAL MEDICINE

## 2024-09-11 ENCOUNTER — OFFICE VISIT (OUTPATIENT)
Dept: SURGERY | Age: 64
End: 2024-09-11
Payer: MEDICARE

## 2024-09-11 VITALS
WEIGHT: 293 LBS | HEART RATE: 101 BPM | HEIGHT: 65 IN | BODY MASS INDEX: 48.82 KG/M2 | DIASTOLIC BLOOD PRESSURE: 84 MMHG | SYSTOLIC BLOOD PRESSURE: 132 MMHG

## 2024-09-11 DIAGNOSIS — G47.33 OSA (OBSTRUCTIVE SLEEP APNEA): ICD-10-CM

## 2024-09-11 DIAGNOSIS — E78.2 MIXED HYPERLIPIDEMIA: ICD-10-CM

## 2024-09-11 DIAGNOSIS — K21.9 GASTROESOPHAGEAL REFLUX DISEASE, UNSPECIFIED WHETHER ESOPHAGITIS PRESENT: ICD-10-CM

## 2024-09-11 DIAGNOSIS — K44.9 PARAESOPHAGEAL HERNIA: ICD-10-CM

## 2024-09-11 DIAGNOSIS — Z71.82 EXERCISE COUNSELING: ICD-10-CM

## 2024-09-11 DIAGNOSIS — Z71.3 DIETARY COUNSELING: ICD-10-CM

## 2024-09-11 DIAGNOSIS — E66.01 MORBID OBESITY (HCC): Primary | ICD-10-CM

## 2024-09-11 DIAGNOSIS — I10 ESSENTIAL HYPERTENSION: ICD-10-CM

## 2024-09-11 DIAGNOSIS — E66.01 OBESITY, MORBID, BMI 50 OR HIGHER (HCC): ICD-10-CM

## 2024-09-11 PROCEDURE — 3075F SYST BP GE 130 - 139MM HG: CPT | Performed by: PHYSICIAN ASSISTANT

## 2024-09-11 PROCEDURE — 3017F COLORECTAL CA SCREEN DOC REV: CPT | Performed by: PHYSICIAN ASSISTANT

## 2024-09-11 PROCEDURE — 3079F DIAST BP 80-89 MM HG: CPT | Performed by: PHYSICIAN ASSISTANT

## 2024-09-11 PROCEDURE — G8427 DOCREV CUR MEDS BY ELIG CLIN: HCPCS | Performed by: PHYSICIAN ASSISTANT

## 2024-09-11 PROCEDURE — 1036F TOBACCO NON-USER: CPT | Performed by: PHYSICIAN ASSISTANT

## 2024-09-11 PROCEDURE — 99215 OFFICE O/P EST HI 40 MIN: CPT | Performed by: PHYSICIAN ASSISTANT

## 2024-09-11 PROCEDURE — G8417 CALC BMI ABV UP PARAM F/U: HCPCS | Performed by: PHYSICIAN ASSISTANT

## 2024-09-23 ENCOUNTER — OFFICE VISIT (OUTPATIENT)
Dept: BEHAVIORAL/MENTAL HEALTH CLINIC | Facility: CLINIC | Age: 64
End: 2024-09-23
Payer: MEDICARE

## 2024-09-23 DIAGNOSIS — F33.42 MDD (MAJOR DEPRESSIVE DISORDER), RECURRENT, IN FULL REMISSION (HCC): ICD-10-CM

## 2024-09-23 DIAGNOSIS — Z86.59 HISTORY OF POSTTRAUMATIC STRESS DISORDER (PTSD): ICD-10-CM

## 2024-09-23 DIAGNOSIS — E66.01 MORBID OBESITY WITH BMI OF 50.0-59.9, ADULT: Primary | ICD-10-CM

## 2024-09-23 PROCEDURE — 1036F TOBACCO NON-USER: CPT | Performed by: SOCIAL WORKER

## 2024-09-23 PROCEDURE — 90791 PSYCH DIAGNOSTIC EVALUATION: CPT | Performed by: SOCIAL WORKER

## 2024-09-23 ASSESSMENT — PATIENT HEALTH QUESTIONNAIRE - PHQ9
5. POOR APPETITE OR OVEREATING: NOT AT ALL
4. FEELING TIRED OR HAVING LITTLE ENERGY: SEVERAL DAYS
SUM OF ALL RESPONSES TO PHQ9 QUESTIONS 1 & 2: 0
SUM OF ALL RESPONSES TO PHQ QUESTIONS 1-9: 2
6. FEELING BAD ABOUT YOURSELF - OR THAT YOU ARE A FAILURE OR HAVE LET YOURSELF OR YOUR FAMILY DOWN: NOT AT ALL
2. FEELING DOWN, DEPRESSED OR HOPELESS: NOT AT ALL
7. TROUBLE CONCENTRATING ON THINGS, SUCH AS READING THE NEWSPAPER OR WATCHING TELEVISION: NOT AT ALL
SUM OF ALL RESPONSES TO PHQ QUESTIONS 1-9: 2
8. MOVING OR SPEAKING SO SLOWLY THAT OTHER PEOPLE COULD HAVE NOTICED. OR THE OPPOSITE, BEING SO FIGETY OR RESTLESS THAT YOU HAVE BEEN MOVING AROUND A LOT MORE THAN USUAL: NOT AT ALL
SUM OF ALL RESPONSES TO PHQ QUESTIONS 1-9: 2
SUM OF ALL RESPONSES TO PHQ QUESTIONS 1-9: 2
9. THOUGHTS THAT YOU WOULD BE BETTER OFF DEAD, OR OF HURTING YOURSELF: NOT AT ALL
1. LITTLE INTEREST OR PLEASURE IN DOING THINGS: NOT AT ALL
3. TROUBLE FALLING OR STAYING ASLEEP: SEVERAL DAYS

## 2024-09-23 ASSESSMENT — ANXIETY QUESTIONNAIRES
4. TROUBLE RELAXING: NOT AT ALL
1. FEELING NERVOUS, ANXIOUS, OR ON EDGE: NOT AT ALL
2. NOT BEING ABLE TO STOP OR CONTROL WORRYING: NOT AT ALL
3. WORRYING TOO MUCH ABOUT DIFFERENT THINGS: SEVERAL DAYS
5. BEING SO RESTLESS THAT IT IS HARD TO SIT STILL: NOT AT ALL
GAD7 TOTAL SCORE: 2
7. FEELING AFRAID AS IF SOMETHING AWFUL MIGHT HAPPEN: NOT AT ALL
6. BECOMING EASILY ANNOYED OR IRRITABLE: SEVERAL DAYS

## 2024-10-01 NOTE — PROGRESS NOTES
Lydia Smith PA-C  Bariatric & Advanced Laparoscopic Surgery & Endoscopy      Name: Bindu Garduno      MRN: [id number]       : 1960       Sex: female  PCP: Allison Loja MD     CC:  Bariatric monthly dietary follow up  Surgeon: Dr. Lizbeth Zurita  Procedure:  laparoscopic gastric bypass, hiatal hernia repair   Surgical Consult Date: 2024    The patient is a 63 y.o. female presenting with a height of 1.651 m (5' 5\") and weight of (!) 145.6 kg (321 lb), giving her a Body mass index is 53.42 kg/m².  She has an ideal body weight of 150 lbs, and excess body weight of 171 lbs.   She started our program with a weight of 323 lbs, lost 2lbs.    Subjective   She is in the process of completing all aspects of our prep program and to be deemed an acceptable candidate for bariatric surgery.    Patient has a long term history of obesity with multiple failed attempts at weight reduction.  Patient denies any changes in health history or physical health since last visit.  Patient has been adherent to her diet and exercise regimen.  Patient feels that she is well informed regarding her bariatric surgery choice and would like to proceed with a laparoscopic yakov-en-y gastric bypass for weight reduction, improvement of her medical conditions, and improved quality of life. Patient verbalized understanding of the risks associated with bariatric surgery. Patient also verbalized understanding that bariatric surgery is a tool and that weight reduction is dependent on behavioral changes in regards to what she eats and how much.       Objective   PRE-OPERATIVE TESTING:   PSYCHOLOGICAL EVALUATION:  Completed, 2024 with Naz Brennan deeming her an acceptable candidate.  DIETITIAN EVALUATION:  In progress with Karen Hernández RD, LD  BARIATRIC LABS:  Completed, 08/15/2024 (triglycerides 211, HDL 69, folate 28.1)  UPPER GI:  Not scheduled  PHYSICAL THERAPY EVALUATION:  Completed, 2024  CARDIAC

## 2024-10-02 ENCOUNTER — OFFICE VISIT (OUTPATIENT)
Dept: SURGERY | Age: 64
End: 2024-10-02

## 2024-10-02 VITALS
WEIGHT: 293 LBS | SYSTOLIC BLOOD PRESSURE: 129 MMHG | HEIGHT: 65 IN | HEART RATE: 75 BPM | DIASTOLIC BLOOD PRESSURE: 88 MMHG | BODY MASS INDEX: 48.82 KG/M2

## 2024-10-02 DIAGNOSIS — E78.2 MIXED HYPERLIPIDEMIA: ICD-10-CM

## 2024-10-02 DIAGNOSIS — G47.33 OSA (OBSTRUCTIVE SLEEP APNEA): ICD-10-CM

## 2024-10-02 DIAGNOSIS — K44.9 PARAESOPHAGEAL HERNIA: ICD-10-CM

## 2024-10-02 DIAGNOSIS — Z71.82 EXERCISE COUNSELING: ICD-10-CM

## 2024-10-02 DIAGNOSIS — E66.01 OBESITY, MORBID, BMI 50 OR HIGHER: ICD-10-CM

## 2024-10-02 DIAGNOSIS — E66.01 MORBID OBESITY: Primary | ICD-10-CM

## 2024-10-02 DIAGNOSIS — I10 ESSENTIAL HYPERTENSION: ICD-10-CM

## 2024-10-02 DIAGNOSIS — Z71.3 DIETARY COUNSELING: ICD-10-CM

## 2024-10-02 DIAGNOSIS — K21.9 GASTROESOPHAGEAL REFLUX DISEASE, UNSPECIFIED WHETHER ESOPHAGITIS PRESENT: ICD-10-CM

## 2024-10-02 NOTE — PROGRESS NOTES
NUTRITION REASSESSMENT    Evaluation:    Pt reports good dietary compliance over the past month- given the circumstances of Shani.  Pt is eating at least 3x/d.  Pt reports good compliance with mindful eating behaviors. Pt is practicing not drinking with meals. Pt is exercising via 3 x daily. Pt has been educated on acceptable dietary supplements for use after surgery.  Diet Recall:  Breakfast: Eggs   Lunch: None  Dinner: Chx and rice    Intervention:    Continue with current diet rx.    *Pt verbalizes understanding of information provided during this session.     Impression:      Readiness to learn and change: Fair   Comprehension level: Fair   Anticipated compliance: Fair     Fair SWL candidate at this time.  Pt has made good changes during supervised diet with RD. RD feels as though pt will be able to adhere to post-operative instructions and plan of care. Pt understands the habits that must be in place to support the SWL tool. I look forward to continuing to work with pt.     Karen Hernández MBA. ANDREW, LD

## 2024-10-08 ENCOUNTER — CLINICAL DOCUMENTATION (OUTPATIENT)
Dept: SURGERY | Age: 64
End: 2024-10-08

## 2024-10-08 DIAGNOSIS — F43.10 PTSD (POST-TRAUMATIC STRESS DISORDER): ICD-10-CM

## 2024-10-08 DIAGNOSIS — F33.42 MDD (MAJOR DEPRESSIVE DISORDER), RECURRENT, IN FULL REMISSION (HCC): Primary | ICD-10-CM

## 2024-10-08 DIAGNOSIS — Z86.59 HISTORY OF POSTTRAUMATIC STRESS DISORDER (PTSD): ICD-10-CM

## 2024-10-17 ENCOUNTER — TELEPHONE (OUTPATIENT)
Age: 64
End: 2024-10-17

## 2024-10-17 NOTE — TELEPHONE ENCOUNTER
PT missed echo 10/17. Asking to be rescheduled also wants to know if she should keep her 10/21 appt

## 2024-10-21 ENCOUNTER — OFFICE VISIT (OUTPATIENT)
Dept: BEHAVIORAL/MENTAL HEALTH CLINIC | Facility: CLINIC | Age: 64
End: 2024-10-21
Payer: MEDICARE

## 2024-10-21 DIAGNOSIS — F33.42 MDD (MAJOR DEPRESSIVE DISORDER), RECURRENT, IN FULL REMISSION (HCC): Primary | ICD-10-CM

## 2024-10-21 DIAGNOSIS — Z86.59 HISTORY OF POSTTRAUMATIC STRESS DISORDER (PTSD): ICD-10-CM

## 2024-10-21 PROCEDURE — 90837 PSYTX W PT 60 MINUTES: CPT | Performed by: SOCIAL WORKER

## 2024-10-21 PROCEDURE — 1036F TOBACCO NON-USER: CPT | Performed by: SOCIAL WORKER

## 2024-10-21 NOTE — PROGRESS NOTES
INDIVIDUAL THERAPY NOTE  NAME: Bindu Garduno    DATE: 10/21/2024    TYPE OF SERVICE: Individual Therapy    LOCATION OF SERVICE: [facility]    DURATION: 55 mins    DIAGNOSIS: :    1. MDD (major depressive disorder), recurrent, in full remission (HCC)    2. History of posttraumatic stress disorder (PTSD)        New Symptoms:   [x]No  []Yes:  Progress Status:  [x]Improving []Declining []Stable []Static  Change in Goals:  [x]No  []Yes    Chief Complaint: symptoms of depression    Plan: CBT, DBT, CPT, Humanistic/Client Centered, ACT, Seeking Safety, Psychodynamic, ERP, IFS, and Poly Vagal Theory may be used to address symptoms associated with pt diagnosis and goals.    Mental Status Exam:  Pt was appropriately dressed and groomed.  Pt was 0x4. No unusual mannerisms were noted.  No psychotic symptoms displayed by pt.  Pt was cooperative and engaged in the session.  Insight and judgment were intact.  Denied active suicidal or homicidal ideation.  Fund of knowledge and attention span are WNL.  Denies developmental or language difficulties.  Mood/Affect: mildly depressed with congruent affect  Thought Process: linear and coherent    Summary of Service:  This SW met with the pt face to face in the office.  The pt updated this SW on her mental status.  Goals continued to be addressed using various therapeutic models listed above.  HW is given as deemed appropriate.  Guided questions were asked, support and active listening were provided, distorted thinking was challenged, and strengths were validated and reinforced.  Motivational interviewing questions were asked to promote change.  Other therapeutic techniques were also utilized based upon the pt's needs and issues.    Issues Addressed:       []Trauma/Abuse Symptoms    []Grief    []Anxiety/Panic Attacks/OCD    [x]Depression    [x]Relational Issues   []Self Esteem    []Anger Management    []Mood Instability  []ADHD Symptoms  []Other     Follow Up: 2 weeks

## 2024-10-21 NOTE — PSYCHOTHERAPY
INDIVIDUAL THERAPY NOTE  NAME: [place] Garduno    Pt is not happy with her spouse.  He is now supportive  He can sabotage,  Psycho education about PD was provided.  Detachment was discussed.  Support given.

## 2024-10-24 ENCOUNTER — HOSPITAL ENCOUNTER (OUTPATIENT)
Dept: GENERAL RADIOLOGY | Age: 64
Discharge: HOME | End: 2024-10-27
Payer: MEDICARE

## 2024-10-24 DIAGNOSIS — E66.01 MORBID OBESITY: ICD-10-CM

## 2024-10-24 DIAGNOSIS — Z71.89 ENCOUNTER FOR PRE-BARIATRIC SURGERY COUNSELING AND EDUCATION: ICD-10-CM

## 2024-10-24 DIAGNOSIS — Z01.812 ENCOUNTER FOR PRE-OPERATIVE LABORATORY TESTING: ICD-10-CM

## 2024-10-24 DIAGNOSIS — K21.9 GASTROESOPHAGEAL REFLUX DISEASE, UNSPECIFIED WHETHER ESOPHAGITIS PRESENT: ICD-10-CM

## 2024-10-24 PROCEDURE — 2500000003 HC RX 250 WO HCPCS: Performed by: PHYSICIAN ASSISTANT

## 2024-10-24 PROCEDURE — 6370000000 HC RX 637 (ALT 250 FOR IP): Performed by: PHYSICIAN ASSISTANT

## 2024-10-24 PROCEDURE — 74246 X-RAY XM UPR GI TRC 2CNTRST: CPT

## 2024-10-24 RX ADMIN — BARIUM SULFATE 1 TABLET: 700 TABLET ORAL at 09:49

## 2024-10-24 RX ADMIN — ANTACID/ANTIFLATULENT 1 EACH: 380; 550; 10; 10 GRANULE, EFFERVESCENT ORAL at 09:49

## 2024-10-24 RX ADMIN — BARIUM SULFATE 355 ML: 0.6 SUSPENSION ORAL at 09:49

## 2024-10-24 RX ADMIN — BARIUM SULFATE 140 ML: 980 POWDER, FOR SUSPENSION ORAL at 09:49

## 2024-10-28 DIAGNOSIS — Z01.818 PRE-OP EVALUATION: Primary | ICD-10-CM

## 2024-11-05 ENCOUNTER — TELEPHONE (OUTPATIENT)
Dept: SURGERY | Age: 64
End: 2024-11-05

## 2024-11-05 ENCOUNTER — HOSPITAL ENCOUNTER (OUTPATIENT)
Dept: GENERAL RADIOLOGY | Age: 64
Discharge: HOME | End: 2024-11-08
Payer: MEDICARE

## 2024-11-05 ENCOUNTER — OFFICE VISIT (OUTPATIENT)
Dept: PULMONOLOGY | Age: 64
End: 2024-11-05
Payer: MEDICARE

## 2024-11-05 VITALS
BODY MASS INDEX: 44.41 KG/M2 | SYSTOLIC BLOOD PRESSURE: 140 MMHG | WEIGHT: 293 LBS | TEMPERATURE: 98 F | RESPIRATION RATE: 20 BRPM | HEART RATE: 99 BPM | OXYGEN SATURATION: 95 % | DIASTOLIC BLOOD PRESSURE: 80 MMHG | HEIGHT: 68 IN

## 2024-11-05 DIAGNOSIS — E66.01 CLASS 3 SEVERE OBESITY DUE TO EXCESS CALORIES WITH SERIOUS COMORBIDITY AND BODY MASS INDEX (BMI) OF 45.0 TO 49.9 IN ADULT: ICD-10-CM

## 2024-11-05 DIAGNOSIS — Z01.818 PREOP EXAMINATION: Primary | ICD-10-CM

## 2024-11-05 DIAGNOSIS — Z01.818 PRE-OP EVALUATION: ICD-10-CM

## 2024-11-05 DIAGNOSIS — E66.813 CLASS 3 SEVERE OBESITY DUE TO EXCESS CALORIES WITH SERIOUS COMORBIDITY AND BODY MASS INDEX (BMI) OF 45.0 TO 49.9 IN ADULT: ICD-10-CM

## 2024-11-05 DIAGNOSIS — G47.33 OSA (OBSTRUCTIVE SLEEP APNEA): ICD-10-CM

## 2024-11-05 LAB
EXPIRATORY TIME: NORMAL
FEF 25-75% %PRED-PRE: NORMAL
FEF 25-75% PRED: NORMAL
FEF 25-75-PRE: NORMAL
FEV1 %PRED-PRE: 74 %
FEV1 PRED: NORMAL
FEV1/FVC %PRED-PRE: NORMAL
FEV1/FVC PRED: NORMAL
FEV1/FVC: 79 %
FEV1: 1.74 L
FVC %PRED-PRE: 74 %
FVC PRED: NORMAL
FVC: 2.2 L
PEF %PRED-PRE: NORMAL
PEF PRED: NORMAL
PEF-PRE: NORMAL

## 2024-11-05 PROCEDURE — 71046 X-RAY EXAM CHEST 2 VIEWS: CPT

## 2024-11-05 PROCEDURE — G8484 FLU IMMUNIZE NO ADMIN: HCPCS | Performed by: INTERNAL MEDICINE

## 2024-11-05 PROCEDURE — 3017F COLORECTAL CA SCREEN DOC REV: CPT | Performed by: INTERNAL MEDICINE

## 2024-11-05 PROCEDURE — G8427 DOCREV CUR MEDS BY ELIG CLIN: HCPCS | Performed by: INTERNAL MEDICINE

## 2024-11-05 PROCEDURE — 1036F TOBACCO NON-USER: CPT | Performed by: INTERNAL MEDICINE

## 2024-11-05 PROCEDURE — G8417 CALC BMI ABV UP PARAM F/U: HCPCS | Performed by: INTERNAL MEDICINE

## 2024-11-05 PROCEDURE — 94010 BREATHING CAPACITY TEST: CPT | Performed by: INTERNAL MEDICINE

## 2024-11-05 PROCEDURE — 99204 OFFICE O/P NEW MOD 45 MIN: CPT | Performed by: INTERNAL MEDICINE

## 2024-11-05 PROCEDURE — 3077F SYST BP >= 140 MM HG: CPT | Performed by: INTERNAL MEDICINE

## 2024-11-05 PROCEDURE — 3079F DIAST BP 80-89 MM HG: CPT | Performed by: INTERNAL MEDICINE

## 2024-11-05 ASSESSMENT — PULMONARY FUNCTION TESTS
FEV1/FVC: 79
FVC: 2.20
FVC_PERCENT_PREDICTED_PRE: 74
FEV1: 1.74
FEV1_PERCENT_PREDICTED_PRE: 74

## 2024-11-05 NOTE — PROGRESS NOTES
Name:  Bindu Garduno  YOB: 1960   MRN: [id number]      Office Visit: 11/5/2024       Assessment & Plan (Medical Decision Making)    Impression: 64 y.o. female with a history of obesity and what sounds like past obstructive sleep apnea, secondhand smoke exposure, here for preop pulmonary evaluation prior to bariatric surgery.    -Based on her current symptoms it would seem that the patient is at average risk of [place] pulmonary complications.  -Her [place] sleepiness scale score was low and she is not currently using CPAP.  I do not have a good reason to put her through a PSG prior to her surgery but this could be considered in the future if she has any worsening symptoms.  -There is some mild restriction on her spirometry but this is expected with obesity.  -Do not feel that she needs an ABG to evaluate for hypercarbia given the relatively normal CO2 on a recent BMP.  -Can follow-up with us on a as needed basis    1. Preop examination    - Spirometry Without Bronchodilator    2. Class 3 severe obesity due to excess calories with serious comorbidity and body mass index (BMI) of 45.0 to 49.9 in adult      3. STEPHANIE (obstructive sleep apnea)      No orders of the defined types were placed in this encounter.    No orders of the defined types were placed in this encounter.    Edgar Mina MD    No specialty comments available.  No problem-specific Assessment & Plan notes found for this encounter.        _________________________________________________________________________    HISTORY OF PRESENT ILLNESS:    Ms. Bindu Garduno is a 64 y.o. female who is seen at [facility] today for  Pre-op Exam (Bariatric Surgery)   She has a history of HTN, obesity, arthritis, HLD.   She is here today for pre op pulmonary evaluation before bariatric surgery.     She states she has never had any pulmonary problems.   She states she had a sleep study years ago. The first time she was told she needed CPAP and

## 2024-11-05 NOTE — TELEPHONE ENCOUNTER
Patient called requesting a refill of Metformin. Patient has recently decided to pursue surgical weight loss, and has been in the process of completing her requirements. Per [facility], she would like patient to continue taking Metformin until patient has a surgery date. Patient was made aware that she would need to be seen for a refill. Patient verbalized understanding, and patient was made a follow up visit for 11/6/2024.

## 2024-11-06 ENCOUNTER — OFFICE VISIT (OUTPATIENT)
Dept: SURGERY | Age: 64
End: 2024-11-06
Payer: MEDICARE

## 2024-11-06 VITALS
BODY MASS INDEX: 44.41 KG/M2 | SYSTOLIC BLOOD PRESSURE: 152 MMHG | DIASTOLIC BLOOD PRESSURE: 68 MMHG | HEIGHT: 68 IN | HEART RATE: 72 BPM | WEIGHT: 293 LBS

## 2024-11-06 DIAGNOSIS — G47.33 OSA (OBSTRUCTIVE SLEEP APNEA): ICD-10-CM

## 2024-11-06 DIAGNOSIS — Z71.3 DIETARY COUNSELING: ICD-10-CM

## 2024-11-06 DIAGNOSIS — E78.2 MIXED HYPERLIPIDEMIA: ICD-10-CM

## 2024-11-06 DIAGNOSIS — E66.01 OBESITY, MORBID, BMI 50 OR HIGHER: ICD-10-CM

## 2024-11-06 DIAGNOSIS — K21.9 GASTROESOPHAGEAL REFLUX DISEASE, UNSPECIFIED WHETHER ESOPHAGITIS PRESENT: ICD-10-CM

## 2024-11-06 DIAGNOSIS — K44.9 PARAESOPHAGEAL HERNIA: ICD-10-CM

## 2024-11-06 DIAGNOSIS — I10 ESSENTIAL HYPERTENSION: Primary | ICD-10-CM

## 2024-11-06 PROCEDURE — G8428 CUR MEDS NOT DOCUMENT: HCPCS | Performed by: SURGERY

## 2024-11-06 PROCEDURE — 99215 OFFICE O/P EST HI 40 MIN: CPT | Performed by: SURGERY

## 2024-11-06 PROCEDURE — 3078F DIAST BP <80 MM HG: CPT | Performed by: SURGERY

## 2024-11-06 PROCEDURE — G8484 FLU IMMUNIZE NO ADMIN: HCPCS | Performed by: SURGERY

## 2024-11-06 PROCEDURE — 1036F TOBACCO NON-USER: CPT | Performed by: SURGERY

## 2024-11-06 PROCEDURE — G8417 CALC BMI ABV UP PARAM F/U: HCPCS | Performed by: SURGERY

## 2024-11-06 PROCEDURE — 3077F SYST BP >= 140 MM HG: CPT | Performed by: SURGERY

## 2024-11-06 PROCEDURE — 3017F COLORECTAL CA SCREEN DOC REV: CPT | Performed by: SURGERY

## 2024-11-06 NOTE — PROGRESS NOTES
OBESITY MEDICINE NUTRITION REASSESSMENT     Assessment:    Pt reports good dietary compliance since last office visit. Pt is eating at least 3x/d. Pt is drinking water. Pt is exercising via walking.   Diet Recall:              Breakfast: Eggs              Lunch: Grapes              Dinner: Fish and greens     Intervention:   Evaluated diet recall and identified modifications.  Encouraged pt to continue to increase lean protein intake  Encouraged continued and increased activity- as able.          Monitoring and Evaluation:  Monitor for continued safe, supervised weight loss for OM.    F/U per MD Karen Hernández MBA. RD, LD    
nausea\"       ROS: The patient has no difficulty with chest pain or shortness of breath.  No fever or chills.  Comprehensive 13 point review of systems was otherwise unremarkable except as noted above.    Physical Exam:     BP (!) 152/68   Pulse 72   Ht 1.727 m (5' 8\")   Wt (!) 144.2 kg (318 lb)   BMI 48.35 kg/m²       General:  Well-developed, well-nourished, no distress.  Psych:  Cooperative, good insight and judgement.  Neuro:  Alert, oriented to person, place and time.  HEENT:  Normocephalic, atraumatic. Sclera clear.  Lungs:  Unlabored breathing. Symmetrical chest expansion.   Chest wall:  No tenderness or deformity.  Heart:  Regular rate and rhythm. No JVD.  Extremities:  Extremities normal, atraumatic, no cyanosis or edema.  Skin:  Skin color, texture, turgor normal. No rashes.    ASSESSMENT:  Morbid obesity with a Body mass index is 48.35 kg/m². during multi-disciplinary weight loss prep program.    Pt notes no changes to comorbid conditions and no side effects to current medication regimen.     OM History   Metformin 500-1000 mg - 2/22/2024  Metformin 500 mg BID - 3/13/2024  Metformin 1000 mg BID - 4/24/2024     Diagnosis Orders   1. Essential hypertension        2. Obesity, morbid, BMI 50 or higher        3. Gastroesophageal reflux disease, unspecified whether esophagitis present        4. Paraesophageal hernia        5. STEPHANIE (obstructive sleep apnea)        6. Mixed hyperlipidemia        7. Dietary counseling              PLAN:     1.         Patient is an excellent candidate with a clear medical necessity for weight loss.     2.         We discussed current medication. I went over risks and benefits of the medication at length.     Metformin increases glucose uptake in muscle and helps block glucose absorption in intestine. Side effects include lactic acidosis, nausea, vomiting, diarrhea, abdominal pain and altered taste.       3.         Encouraged patient to participate in our Bariatric Support

## 2024-11-15 ENCOUNTER — OFFICE VISIT (OUTPATIENT)
Dept: ORTHOPEDIC SURGERY | Age: 64
End: 2024-11-15

## 2024-11-15 DIAGNOSIS — M17.11 PRIMARY OSTEOARTHRITIS OF RIGHT KNEE: ICD-10-CM

## 2024-11-15 DIAGNOSIS — M17.12 PRIMARY OSTEOARTHRITIS OF LEFT KNEE: Primary | ICD-10-CM

## 2024-11-15 RX ORDER — METHYLPREDNISOLONE ACETATE 40 MG/ML
80 INJECTION, SUSPENSION INTRA-ARTICULAR; INTRALESIONAL; INTRAMUSCULAR; SOFT TISSUE ONCE
Status: COMPLETED | OUTPATIENT
Start: 2024-11-15 | End: 2024-11-15

## 2024-11-15 RX ADMIN — METHYLPREDNISOLONE ACETATE 80 MG: 40 INJECTION, SUSPENSION INTRA-ARTICULAR; INTRALESIONAL; INTRAMUSCULAR; SOFT TISSUE at 10:19

## 2024-11-15 RX ADMIN — METHYLPREDNISOLONE ACETATE 80 MG: 40 INJECTION, SUSPENSION INTRA-ARTICULAR; INTRALESIONAL; INTRAMUSCULAR; SOFT TISSUE at 10:18

## 2024-11-15 NOTE — PROGRESS NOTES
the procedure and its associated risks.     Procedure: The patient was placed in a sitting position on the exam table and both knees were prepped in the usual sterile manner. Each knee was anesthestized with 3 cc of xylocaine with a 25 gauge needle, the needle was left in place a total of 2cc of 40mg methylprednisolone and 5 cc of Lidocaine was slowly injected. The patient tolerated the procedure well. The injection areas were cleaned and Band-Aid applied. No excessive bleeding was noted. Patient dressed and was discharged to home with instructions.    Discussion:  The patient tolerated the procedure well.    Follow up: four months        ECTOR Strong  November 15, 2024      Recommendations:   This patient's clinical history and physical exam is consistent with osteoarthritis of the bilateral  knee(s). We discussed the natural history of this condition as a degenerative process that causes inflammation of the joints due to a breakdown of cartilage, the hard, thick tissue that cushions the joints. We discussed that osteoarthritis never completely resolves on its own and symptoms including pain, stiffness, and swelling may wax and wane as the condition progresses. She understands that conservative treatments typically include activity modification, NSAIDs and physical therapy.  Oral and/or steroid injections into the joint could be considered in resistant scenarios. Viscosupplementation injections may also be useful as a temporary cartilage replacement in certain patients. I advised to avoid any prolonged bedrest and to try to maintain ADLs as much as possible. The patient was counseled to follow up with me should she develop any worsening symptoms that begin to limit activities of daily living.   Patient tolerated cortisone injection well today.  We discussed conservative management including tylenol, ice, activity modification, and additional shots or PT in the future.  For now they will function as

## 2024-11-25 ENCOUNTER — TELEPHONE (OUTPATIENT)
Dept: ORTHOPEDIC SURGERY | Age: 64
End: 2024-11-25

## 2024-11-25 DIAGNOSIS — M17.11 PRIMARY OSTEOARTHRITIS OF RIGHT KNEE: ICD-10-CM

## 2024-11-25 DIAGNOSIS — M17.12 PRIMARY OSTEOARTHRITIS OF LEFT KNEE: Primary | ICD-10-CM

## 2024-11-25 NOTE — TELEPHONE ENCOUNTER
She left a voicemail message needing to speak to someone about getting a note that she needs to use a walker or wheelchair at all times. She also mentioned a bedside commode. Please give her a call.

## 2024-11-25 NOTE — TELEPHONE ENCOUNTER
Dropped order for bedside commode and printed letter saying that she needs to use a walker or wheelchair at all times. Left both at the  at [address].

## 2024-11-26 ENCOUNTER — OFFICE VISIT (OUTPATIENT)
Dept: BEHAVIORAL/MENTAL HEALTH CLINIC | Facility: CLINIC | Age: 64
End: 2024-11-26
Payer: MEDICARE

## 2024-11-26 DIAGNOSIS — F33.42 MDD (MAJOR DEPRESSIVE DISORDER), RECURRENT, IN FULL REMISSION (HCC): Primary | ICD-10-CM

## 2024-11-26 DIAGNOSIS — Z86.59 HISTORY OF POSTTRAUMATIC STRESS DISORDER (PTSD): ICD-10-CM

## 2024-11-26 DIAGNOSIS — Z63.8 FAMILY CONFLICT: ICD-10-CM

## 2024-11-26 PROCEDURE — 1036F TOBACCO NON-USER: CPT | Performed by: SOCIAL WORKER

## 2024-11-26 PROCEDURE — 90834 PSYTX W PT 45 MINUTES: CPT | Performed by: SOCIAL WORKER

## 2024-11-26 SDOH — SOCIAL STABILITY - SOCIAL INSECURITY: OTHER SPECIFIED PROBLEMS RELATED TO PRIMARY SUPPORT GROUP: Z63.8

## 2024-11-26 ASSESSMENT — PATIENT HEALTH QUESTIONNAIRE - PHQ9
SUM OF ALL RESPONSES TO PHQ QUESTIONS 1-9: 4
4. FEELING TIRED OR HAVING LITTLE ENERGY: MORE THAN HALF THE DAYS
7. TROUBLE CONCENTRATING ON THINGS, SUCH AS READING THE NEWSPAPER OR WATCHING TELEVISION: NOT AT ALL
SUM OF ALL RESPONSES TO PHQ QUESTIONS 1-9: 4
2. FEELING DOWN, DEPRESSED OR HOPELESS: NOT AT ALL
3. TROUBLE FALLING OR STAYING ASLEEP: NOT AT ALL
SUM OF ALL RESPONSES TO PHQ9 QUESTIONS 1 & 2: 2
SUM OF ALL RESPONSES TO PHQ QUESTIONS 1-9: 4
SUM OF ALL RESPONSES TO PHQ QUESTIONS 1-9: 4
9. THOUGHTS THAT YOU WOULD BE BETTER OFF DEAD, OR OF HURTING YOURSELF: NOT AT ALL
1. LITTLE INTEREST OR PLEASURE IN DOING THINGS: MORE THAN HALF THE DAYS
6. FEELING BAD ABOUT YOURSELF - OR THAT YOU ARE A FAILURE OR HAVE LET YOURSELF OR YOUR FAMILY DOWN: NOT AT ALL
8. MOVING OR SPEAKING SO SLOWLY THAT OTHER PEOPLE COULD HAVE NOTICED. OR THE OPPOSITE, BEING SO FIGETY OR RESTLESS THAT YOU HAVE BEEN MOVING AROUND A LOT MORE THAN USUAL: NOT AT ALL
5. POOR APPETITE OR OVEREATING: NOT AT ALL

## 2024-11-26 NOTE — PROGRESS NOTES
INDIVIDUAL THERAPY NOTE  NAME: Bindu Garduno    DATE: 11/26/2024    TYPE OF SERVICE: Individual Therapy    LOCATION OF SERVICE: [facility]    DURATION: 50 mins    DIAGNOSIS: :    1. MDD (major depressive disorder), recurrent, in full remission (HCC)    2. History of posttraumatic stress disorder (PTSD)    3. Family conflict        New Symptoms:   [x]No  []Yes:  Progress Status:  [x]Improving []Declining []Stable []Static  Change in Goals:  [x]No  []Yes    Chief Complaint: mild depressive symptoms, stress from marital issues    Plan: CBT, DBT, CPT, Humanistic/Client Centered, ACT, Seeking Safety, Psychodynamic, ERP, IFS, and Poly Vagal Theory may be used to address symptoms associated with pt diagnosis and goals.    Mental Status Exam:  Pt was appropriately dressed and groomed.  Pt was 0x4. No unusual mannerisms were noted.  No psychotic symptoms displayed by pt.  Pt was cooperative and engaged in the session.  Insight and judgment were intact.  Denied active suicidal or homicidal ideation.  Fund of knowledge and attention span are WNL.  Denies developmental or language difficulties.  Mood/Affect: even with congruent affect  Thought Process:linear and coherent    Summary of Service:  This SW met with the pt face to face in the office.  The pt updated this SW on her mental status.  Goals continued to be addressed using various therapeutic models listed above.  HW is given as deemed appropriate.  Guided questions were asked, support and active listening were provided, distorted thinking was challenged, and strengths were validated and reinforced.  Motivational interviewing questions were asked to promote change.  Other therapeutic techniques were also utilized based upon the pt's needs and issues.    Issues Addressed:       []Trauma  []Grief  []Anxiety/Panic Attacks  [x]Depression    [x]Relational Issues/Boundaries  [x]Self Esteem    []Anger Management  []Mood Instability  []Other     Follow Up: 2

## 2024-11-26 NOTE — PSYCHOTHERAPY
INDIVIDUAL THERAPY NOTE  NAME: Bindu Garduno Pt more hopeful now.  Her affordable housing has come through and she believes she will soon be able to leave her .  Thoughts and feelings processed.

## 2025-01-11 NOTE — PROGRESS NOTES
[facility] CARDIOLOGY  [address] [place] [address], [address]  [place] [address]  PHONE: [phone]        25        NAME:  Bindu Garduno  : 1960  MRN: [id number]     Pre op bariatric sx  Obesity  Hiatal hernia  Dyslipidemia  Rbbb  Htn  Dyslipidemia    CHIEF COMPLAINT:    Pre-op Exam      SUBJECTIVE:     Chronic arthritis has flared up.  No chest pain or palpitation or dizziness.       Medications were all reviewed with the patient today and updated as necessary.   Current Outpatient Medications   Medication Sig    metFORMIN (GLUCOPHAGE) 500 MG tablet Take 2 tablets by mouth 2 times daily (with meals)    ferrous sulfate (IRON 325) 325 (65 Fe) MG tablet Take 1 tablet by mouth daily    levothyroxine (SYNTHROID) 25 MCG tablet Take 1 tablet by mouth daily    lovastatin (MEVACOR) 40 MG tablet Take 2 tablets by mouth    potassium chloride (KLOR-CON) 10 MEQ extended release tablet Take 1 tablet by mouth daily    ibuprofen (ADVIL;MOTRIN) 800 MG tablet Take 1 tablet by mouth every 6 hours as needed for Pain    omeprazole (PRILOSEC) 40 MG delayed release capsule Take 1 capsule by mouth daily    furosemide (LASIX) 20 MG tablet Take 1 tablet by mouth 2 times daily    buPROPion (WELLBUTRIN) 75 MG tablet Take 1 tablet by mouth 2 times daily    folic acid (FOLVITE) 1 MG tablet Take 1 tablet by mouth daily    Icosapent Ethyl (VASCEPA) 1 g CAPS capsule Take 2 capsules by mouth 2 times daily    sertraline (ZOLOFT) 100 MG tablet Take 1 tablet by mouth daily    amLODIPine (NORVASC) 10 MG tablet Take 1 tablet by mouth daily    clotrimazole-betamethasone (LOTRISONE) 1-0.05 % cream APPLY SPARINGLY TO AFFECTED AREA 3 TIMES A DAY    ketoconazole (NIZORAL) 2 % cream Apply topically 2 times daily (Patient not taking: Reported on 9/10/2024)    diclofenac sodium (VOLTAREN) 1 % GEL Apply 2 g topically 4 times daily (Patient not taking: Reported on 2025)    diclofenac sodium (VOLTAREN) 1 % GEL Apply 4 g topically 4 times

## 2025-01-13 ENCOUNTER — OFFICE VISIT (OUTPATIENT)
Age: 65
End: 2025-01-13
Payer: MEDICARE

## 2025-01-13 VITALS
HEIGHT: 68 IN | SYSTOLIC BLOOD PRESSURE: 138 MMHG | HEART RATE: 88 BPM | DIASTOLIC BLOOD PRESSURE: 94 MMHG | WEIGHT: 293 LBS | BODY MASS INDEX: 44.41 KG/M2

## 2025-01-13 DIAGNOSIS — E66.01 CLASS 3 SEVERE OBESITY DUE TO EXCESS CALORIES WITH SERIOUS COMORBIDITY AND BODY MASS INDEX (BMI) OF 45.0 TO 49.9 IN ADULT: ICD-10-CM

## 2025-01-13 DIAGNOSIS — E66.813 CLASS 3 SEVERE OBESITY DUE TO EXCESS CALORIES WITH SERIOUS COMORBIDITY AND BODY MASS INDEX (BMI) OF 45.0 TO 49.9 IN ADULT: ICD-10-CM

## 2025-01-13 DIAGNOSIS — I11.9 HYPERTENSIVE HEART DISEASE WITHOUT HEART FAILURE: ICD-10-CM

## 2025-01-13 DIAGNOSIS — M17.12 PRIMARY OSTEOARTHRITIS OF LEFT KNEE: ICD-10-CM

## 2025-01-13 DIAGNOSIS — M17.11 PRIMARY OSTEOARTHRITIS OF RIGHT KNEE: ICD-10-CM

## 2025-01-13 DIAGNOSIS — Z01.818 PRE-OP EVALUATION: Primary | ICD-10-CM

## 2025-01-13 PROCEDURE — 3075F SYST BP GE 130 - 139MM HG: CPT | Performed by: INTERNAL MEDICINE

## 2025-01-13 PROCEDURE — 3080F DIAST BP >= 90 MM HG: CPT | Performed by: INTERNAL MEDICINE

## 2025-01-13 PROCEDURE — 99213 OFFICE O/P EST LOW 20 MIN: CPT | Performed by: INTERNAL MEDICINE

## 2025-01-13 PROCEDURE — 1036F TOBACCO NON-USER: CPT | Performed by: INTERNAL MEDICINE

## 2025-01-13 PROCEDURE — G8417 CALC BMI ABV UP PARAM F/U: HCPCS | Performed by: INTERNAL MEDICINE

## 2025-01-13 PROCEDURE — G8428 CUR MEDS NOT DOCUMENT: HCPCS | Performed by: INTERNAL MEDICINE

## 2025-01-13 PROCEDURE — 3017F COLORECTAL CA SCREEN DOC REV: CPT | Performed by: INTERNAL MEDICINE

## 2025-01-13 RX ORDER — IBUPROFEN 800 MG/1
800 TABLET, FILM COATED ORAL EVERY 6 HOURS PRN
Qty: 270 TABLET | Refills: 1 | Status: SHIPPED | OUTPATIENT
Start: 2025-01-13

## 2025-01-14 ENCOUNTER — OFFICE VISIT (OUTPATIENT)
Dept: SURGERY | Age: 65
End: 2025-01-14
Payer: MEDICARE

## 2025-01-14 VITALS
WEIGHT: 293 LBS | BODY MASS INDEX: 44.41 KG/M2 | SYSTOLIC BLOOD PRESSURE: 127 MMHG | HEART RATE: 111 BPM | DIASTOLIC BLOOD PRESSURE: 76 MMHG | HEIGHT: 68 IN

## 2025-01-14 DIAGNOSIS — I10 ESSENTIAL HYPERTENSION: ICD-10-CM

## 2025-01-14 DIAGNOSIS — E66.01 MORBID OBESITY: Primary | ICD-10-CM

## 2025-01-14 DIAGNOSIS — Z71.3 NUTRITIONAL COUNSELING: ICD-10-CM

## 2025-01-14 DIAGNOSIS — E66.01 OBESITY, CLASS III, BMI 40-49.9 (MORBID OBESITY): ICD-10-CM

## 2025-01-14 DIAGNOSIS — K44.9 PARAESOPHAGEAL HERNIA: ICD-10-CM

## 2025-01-14 DIAGNOSIS — G47.33 OSA (OBSTRUCTIVE SLEEP APNEA): ICD-10-CM

## 2025-01-14 DIAGNOSIS — Z71.82 EXERCISE COUNSELING: ICD-10-CM

## 2025-01-14 DIAGNOSIS — E78.2 MIXED HYPERLIPIDEMIA: ICD-10-CM

## 2025-01-14 DIAGNOSIS — K21.9 GASTROESOPHAGEAL REFLUX DISEASE, UNSPECIFIED WHETHER ESOPHAGITIS PRESENT: ICD-10-CM

## 2025-01-14 PROCEDURE — 99215 OFFICE O/P EST HI 40 MIN: CPT | Performed by: PHYSICIAN ASSISTANT

## 2025-01-14 PROCEDURE — G8427 DOCREV CUR MEDS BY ELIG CLIN: HCPCS | Performed by: PHYSICIAN ASSISTANT

## 2025-01-14 PROCEDURE — G8417 CALC BMI ABV UP PARAM F/U: HCPCS | Performed by: PHYSICIAN ASSISTANT

## 2025-01-14 PROCEDURE — 1036F TOBACCO NON-USER: CPT | Performed by: PHYSICIAN ASSISTANT

## 2025-01-14 PROCEDURE — 3017F COLORECTAL CA SCREEN DOC REV: CPT | Performed by: PHYSICIAN ASSISTANT

## 2025-01-14 PROCEDURE — 3078F DIAST BP <80 MM HG: CPT | Performed by: PHYSICIAN ASSISTANT

## 2025-01-14 PROCEDURE — 3074F SYST BP LT 130 MM HG: CPT | Performed by: PHYSICIAN ASSISTANT

## 2025-01-14 NOTE — PROGRESS NOTES
Anticoagulants No      Patient Active Problem List   Diagnosis    Angular cheilitis    Baker cyst    Benign essential hypertension    Chronic back pain    Chronic hip pain    Depressive disorder    Diverticulosis    Episodic tension-type headache, not intractable    Folic acid deficiency    Vitamin D deficiency    H/O: hysterectomy    Hiatal hernia    History of cholecystectomy    Hyperlipidemia    Hypothyroidism    Intertrigo    Iron deficiency anemia    Mass of left adrenal gland (HCC)    Obstructive sleep apnea syndrome    Oropharyngeal dysphagia    Peripheral edema    Arthritis    Schatzki's ring    Spondylosis of lumbar region without myelopathy or radiculopathy    Vertigo        Current Outpatient Medications   Medication Sig Dispense Refill    ibuprofen (ADVIL;MOTRIN) 800 MG tablet Take 1 tablet by mouth every 6 hours as needed for Pain 270 tablet 1    metFORMIN (GLUCOPHAGE) 500 MG tablet Take 2 tablets by mouth 2 times daily (with meals) 360 tablet 0    clotrimazole-betamethasone (LOTRISONE) 1-0.05 % cream APPLY SPARINGLY TO AFFECTED AREA 3 TIMES A DAY      ferrous sulfate (IRON 325) 325 (65 Fe) MG tablet Take 1 tablet by mouth daily      ketoconazole (NIZORAL) 2 % cream Apply topically 2 times daily (Patient not taking: Reported on 9/10/2024)      levothyroxine (SYNTHROID) 25 MCG tablet Take 1 tablet by mouth daily      lovastatin (MEVACOR) 40 MG tablet Take 2 tablets by mouth      potassium chloride (KLOR-CON) 10 MEQ extended release tablet Take 1 tablet by mouth daily      diclofenac sodium (VOLTAREN) 1 % GEL Apply 2 g topically 4 times daily (Patient not taking: Reported on 1/13/2025) 150 g 2    omeprazole (PRILOSEC) 40 MG delayed release capsule Take 1 capsule by mouth daily      furosemide (LASIX) 20 MG tablet Take 1 tablet by mouth 2 times daily      buPROPion (WELLBUTRIN) 75 MG tablet Take 1 tablet by mouth 2 times daily      folic acid (FOLVITE) 1 MG tablet Take 1 tablet by mouth daily

## 2025-01-21 NOTE — PROGRESS NOTES
rule out concerns with leak, bleeding, and/or obstruction. We discussed specific risks of gastric bypass including but not limited to: pain, infection, bleeding, scar, excess skin, conversion to open approach, hernia, injury to adjacent organs, need for blood transfusion, thromboembolic complications, gastrointestinal leak, stricture, difficulty swallowing, need for gastrostomy/feeding tube, gastroesophageal reflux, esophagitis, need for revisional surgery, failure to lose weight or weight regain, nutritional deficiencies, heart attack, stroke, death.      3. Discussed in detail information and expectations regarding the pre-operative period, the bariatric procedure, and postoperative period.    4. The patient has reviewed, completed, and signed consent that she has viewed all pre operative teaching videos.    5. Stop NSAIDS 7 days prior to surgery date.    6.  May continue Aspirin 81 mg po until day before surgery.    7.  RX for Prilosec, Zofran and Percocet.    8.  Reviewed post op follow up appointment schedule. Patient referred to  to schedule post op follow up visits 1.5 weeks, 3 weeks, 3 months, 6 months, and 12 months.     9. Stressed with patient importance of understanding bariatric surgery is a tool and will not work if patient does not continue with healthy lifestyle changes including regular exercise and high protein, low calorie, low carb diet.    10.  The dietitian reviewed pre-op diet including high protein diet, sugar/calorie free liquids for 2 weeks prior. Liquids only the day before surgery. Full Liquid Diet - from day of discharge from hospital until first Post-Op visit, usually 1.5 weeks.    11.  The dietitian reviewed 2 weeks post op diet full liquids.    12.  Discussed anticipated recovery time off from work. Explained to patient this may vary depending on the patient's overall health and personal recovery. Mihaela-en-y Gastric Bypass 2-4 weeks; Gastric Sleeve 1-2 weeks.    13.  Reviewed

## 2025-01-21 NOTE — H&P (VIEW-ONLY)
Lizbeth Zurita MD   Bariatric & Advanced Laparoscopic Surgery & Endoscopy  [address], [address]  [place] [address]  Phone [phone]   Fax [phone]      Date of visit: 2025      Primary/Requesting provider: Allison Loja MD         Name: Bindu Garduno      MRN: [id number]       : 1960       Age: 64 y.o.    Sex: female        PCP: Allison Loja MD     CC:    Chief Complaint   Patient presents with    Follow-up     BARIATRIC PREOP - pre-op RYGB & hiatal hernia repair         Procedure: robotic assisted gastric bypass and hiatal hernia repair    Surgical Consult Date: 2024    Surgical Date: TBD     The patient is a 64 y.o. female presenting with a height of 1.727 m (5' 8\") and weight of (!) 147 kg (324 lb), giving her a Body mass index is 49.26 kg/m².   She has an ideal body weight of 150 lbs, and excess body weight of 174 lbs.   She started our program with a weight of 323 lbs.    She has completed all aspects of our prep program and has been deemed an acceptable candidate for bariatric surgery.      PSYCHOLOGICAL EVALUATION:   Completed, 2024 with Naz Brennan deeming her an acceptable candidate.     DIETITIAN EVALUATION:  Completed with Karen Hernández RD, LD deeming her an appropriate surgical candidate.    CARDIAC CLEARANCE:  Completed, 2025. She is cleared for surgery and anesthesia as indicated by Dr. Alarcon.    BARIATRIC LABS:  Completed, 08/15/2024 (triglycerides 211, HDL 69, folate 28.1)     PULMONARY CLEARANCE:  Completed, 2024. Patient is average risk for surgery.    UPPER GI:  Completed, 10/24/2024  IMPRESSION:  Mild gastroesophageal reflux observed during the examination.  No evidence of esophagitis, hiatal hernia or esophageal stricture.  No evidence of gastritis or duodenal ulcer.      PHYSICAL THERAPY EVALUATION FOR MOBILITY OPTIMIZATION:   Completed, 2024     We have reviewed the procedure again today and  assessment and plan in detail. Patient verbalized understanding. Questions answered.    14. Based on physical assessments and evaluations, this patient is medically cleared for surgery.      WEIGHT MANAGEMENT:  1. Counseled on weight management and weight loss.  2. Instructed on choosing better foods with alternatives.  3. Advised on low carbohydrate, high protein diet (at least 60 to 80 gm protein daily).  4.  Positive reinforcement provided.    Stop NSAIDs 2 weeks before surgery      Time: I spent 40 minutes preparing to see patient (including chart review and preparation), obtaining and/or reviewing additional medical history, performing a physical exam and evaluation, documenting clinical information in the electronic health record, independently interpreting results, communicating results to patient, family or caregiver, and/or coordinating care.     Signed: Lizbeth Zurita MD  Bariatric & Minimally Invasive Surgery  1/22/2025

## 2025-01-22 ENCOUNTER — TELEPHONE (OUTPATIENT)
Dept: ORTHOPEDIC SURGERY | Age: 65
End: 2025-01-22

## 2025-01-22 ENCOUNTER — OFFICE VISIT (OUTPATIENT)
Dept: SURGERY | Age: 65
End: 2025-01-22

## 2025-01-22 ENCOUNTER — PREP FOR PROCEDURE (OUTPATIENT)
Dept: SURGERY | Age: 65
End: 2025-01-22

## 2025-01-22 VITALS
HEIGHT: 68 IN | BODY MASS INDEX: 44.41 KG/M2 | WEIGHT: 293 LBS | DIASTOLIC BLOOD PRESSURE: 86 MMHG | SYSTOLIC BLOOD PRESSURE: 152 MMHG | HEART RATE: 98 BPM

## 2025-01-22 DIAGNOSIS — Z71.82 EXERCISE COUNSELING: ICD-10-CM

## 2025-01-22 DIAGNOSIS — E66.01 OBESITY, CLASS III, BMI 40-49.9 (MORBID OBESITY): Primary | ICD-10-CM

## 2025-01-22 DIAGNOSIS — K21.9 GASTROESOPHAGEAL REFLUX DISEASE, UNSPECIFIED WHETHER ESOPHAGITIS PRESENT: ICD-10-CM

## 2025-01-22 DIAGNOSIS — G47.33 OSA (OBSTRUCTIVE SLEEP APNEA): ICD-10-CM

## 2025-01-22 DIAGNOSIS — K44.9 PARAESOPHAGEAL HERNIA: ICD-10-CM

## 2025-01-22 DIAGNOSIS — E66.01 MORBID OBESITY: ICD-10-CM

## 2025-01-22 DIAGNOSIS — E78.2 MIXED HYPERLIPIDEMIA: ICD-10-CM

## 2025-01-22 DIAGNOSIS — I10 PRIMARY HYPERTENSION: ICD-10-CM

## 2025-01-22 DIAGNOSIS — I10 ESSENTIAL HYPERTENSION: ICD-10-CM

## 2025-01-22 DIAGNOSIS — Z71.3 NUTRITIONAL COUNSELING: ICD-10-CM

## 2025-01-22 PROBLEM — E78.5 HYPERLIPEMIA: Status: ACTIVE | Noted: 2025-01-22

## 2025-01-22 NOTE — TELEPHONE ENCOUNTER
She left a voicemail message that she is having surgery on Feb 12 and that she has an appt on the 17th. She wants to speak to someone about changing the 17th appt.

## 2025-01-29 ENCOUNTER — HOSPITAL ENCOUNTER (OUTPATIENT)
Dept: SURGERY | Age: 65
Discharge: HOME | End: 2025-02-01
Payer: MEDICARE

## 2025-01-29 VITALS
TEMPERATURE: 97.4 F | WEIGHT: 293 LBS | HEART RATE: 78 BPM | BODY MASS INDEX: 44.41 KG/M2 | RESPIRATION RATE: 16 BRPM | SYSTOLIC BLOOD PRESSURE: 178 MMHG | OXYGEN SATURATION: 94 % | HEIGHT: 68 IN | DIASTOLIC BLOOD PRESSURE: 87 MMHG

## 2025-01-29 LAB
ANION GAP SERPL CALC-SCNC: 13 MMOL/L (ref 7–16)
BUN SERPL-MCNC: 15 MG/DL (ref 8–23)
CALCIUM SERPL-MCNC: 10 MG/DL (ref 8.8–10.2)
CHLORIDE SERPL-SCNC: 101 MMOL/L (ref 98–107)
CO2 SERPL-SCNC: 24 MMOL/L (ref 20–29)
CREAT SERPL-MCNC: 0.73 MG/DL (ref 0.6–1.1)
ERYTHROCYTE [DISTWIDTH] IN BLOOD BY AUTOMATED COUNT: 13.6 % (ref 11.9–14.6)
GLUCOSE SERPL-MCNC: 106 MG/DL (ref 70–99)
HCT VFR BLD AUTO: 37.2 % (ref 35.8–46.3)
HGB BLD-MCNC: 11.9 G/DL (ref 11.7–15.4)
MCH RBC QN AUTO: 32 PG (ref 26.1–32.9)
MCHC RBC AUTO-ENTMCNC: 32 G/DL (ref 31.4–35)
MCV RBC AUTO: 100 FL (ref 82–102)
NRBC # BLD: 0 K/UL (ref 0–0.2)
PLATELET # BLD AUTO: 326 K/UL (ref 150–450)
PMV BLD AUTO: 9.4 FL (ref 9.4–12.3)
POTASSIUM SERPL-SCNC: 4 MMOL/L (ref 3.5–5.1)
RBC # BLD AUTO: 3.72 M/UL (ref 4.05–5.2)
SODIUM SERPL-SCNC: 138 MMOL/L (ref 136–145)
WBC # BLD AUTO: 11.6 K/UL (ref 4.3–11.1)

## 2025-01-29 PROCEDURE — 85027 COMPLETE CBC AUTOMATED: CPT

## 2025-01-29 PROCEDURE — 80048 BASIC METABOLIC PNL TOTAL CA: CPT

## 2025-01-29 RX ORDER — ACETAMINOPHEN 500 MG
500 TABLET ORAL EVERY 6 HOURS PRN
COMMUNITY

## 2025-01-29 NOTE — PERIOP NOTE
PLEASE CONTINUE TAKING ALL PRESCRIPTION MEDICATIONS UP TO THE DAY OF SURGERY UNLESS OTHERWISE DIRECTED BELOW. You may take Tylenol, allergy,  and/or indigestion medications.     TAKE ONLY THESE MEDICATIONS ON THE DAY OF SURGERY    bupropion (Wellbutrin)     Sertraline (Zoloft)      Amlodipine (Norvasc)       DISCONTINUE all vitamins and supplements 7 days prior to surgery. DISCONTINUE Non-Steroidal Anti-Inflammatory (NSAIDS) such as Advil and Aleve 5 days prior to surgery.     Home Medications to Hold- please continue all other medications except these.    Vascepa hold 5 days prior to surgery        Comments   Please drink 32 ounces of non-caffeinated clear liquids 2 hours prior to your arrival to avoid dehydration.   CLEAR LIQUID DIET    Things included in a clear liquid diet:     Water  Black Coffee (may have sugar but no milk or cream)  Tea  Any type soft drink  Apple, Cranberry, or Grape juice  Chicken bouillon or broth  Beef bouillon or broth  Popsicles  Jell-O (any flavor), NO solid fruit mixed in  Hard candy that is clear, such as lifesavers or lemon drops    Things NOT included in clear liquid:     Milk and milk products  Milkshakes  Any solid food  Any solid soup with solid ingredients such as noodles  Any creamed soup  Gum or Mints  Orange juice (or any other juice containing pulp)          On the day before surgery please take 2 Tylenol in the morning and then again before bed. You may use either regular or extra strength.           Please do not bring home medications with you on the day of surgery unless otherwise directed by your nurse.  If you are instructed to bring home medications, please give them to your nurse as they will be administered by the nursing staff.    If you have any questions, please call [facility] [phone].    A copy of this note was provided to the patient for reference.

## 2025-01-29 NOTE — PERIOP NOTE
Patient verified name and     Order for consent was not found in EHR patient verified.     Type 3 surgery, Walk in assessment complete.    Labs per surgeon: none  Labs per anesthesia protocol: BMP, CBC, T&S s/h dos; results pending  EK/10/24 RBBB        Patient provided with and instructed on educational handouts including Guide to Surgery, Preventing Surgical Site Infections, Pain Management, and [place] [facility] Brochure.    Patient answered medical/surgical history questions at their best of ability. All prior to admission medications documented in EPIC. Original medication prescription bottle was not visualized during patient appointment.     Patient instructed to hold all vitamins 7 days prior to surgery and NSAIDS 5 days prior to surgery, patient verbalized understanding.     Patient teach back successful and patient demonstrates knowledge of instructions.

## 2025-01-29 NOTE — PERIOP NOTE
SURGICAL WEIGHT LOSS Enhanced Recovery After Surgery: diabetic and non-diabetic patients      The evening before surgery 2/10/2025, drink 1 bottles of the Ensure Pre-Surgery drink.     Please do not eat or drink after midnight the night before your surgery.     Bring your patient handbook with you to the hospital.        Things to Remember:      1. You will be up in a chair the evening of surgery and sipping on clear liquids, once released by your surgeon.       2. Beginning the day of surgery, you will be out of the bed as able, once released by your hospital team. We encourage you to be sitting up in a chair, walking in the duffy, doing exercises as advised by physical therapy, etc.       3. You will be given scheduled non-narcotic pain medication to help keep your pain under control.  You will have stronger pain medication ordered for break through pain.      4. All of these measures are geared toward improving your overall surgical recovery and   decreasing the risk of complications.

## 2025-01-30 ENCOUNTER — TELEPHONE (OUTPATIENT)
Dept: SURGERY | Age: 65
End: 2025-01-30

## 2025-01-30 NOTE — PERIOP NOTE
Labs within anesthesia guidelines, no follow-up required. Labs automatically routed to ordering provider via Epic documentation.     Abnormal WBC count routed to surgeons office.

## 2025-01-30 NOTE — TELEPHONE ENCOUNTER
Latest Reference Range & Units 01/29/25 15:17   WBC 4.3 - 11.1 K/uL 11.6 (H)   (H): Data is abnormally high

## 2025-01-31 ENCOUNTER — TELEPHONE (OUTPATIENT)
Dept: SURGERY | Age: 65
End: 2025-01-31

## 2025-01-31 DIAGNOSIS — Z71.3 NUTRITIONAL COUNSELING: Primary | ICD-10-CM

## 2025-01-31 NOTE — TELEPHONE ENCOUNTER
Pt called regarding pre-op diet. Informed pt that her pre-op diet started on 01/28 and that she should focus on lean protein and salad-like vegetables. Also told patient that she should follow her clear liquid diet on 02/10, the day before her surgery. Pt verbalized understanding.

## 2025-02-03 NOTE — PERIOP NOTE
Patient called asking what medications to hold and what to take DOS. Instructed patient to revert back to the sheet I typed up for her in the blue folder. Patient stated she couldn't find the folder. Informed patient about the medications and asked patient to write them down. Informed patient to give us a call back if she has any further questions.

## 2025-02-10 RX ORDER — ONDANSETRON 2 MG/ML
4 INJECTION INTRAMUSCULAR; INTRAVENOUS ONCE
Status: CANCELLED | OUTPATIENT
Start: 2025-02-10 | End: 2025-02-10

## 2025-02-11 ENCOUNTER — ANESTHESIA EVENT (OUTPATIENT)
Dept: SURGERY | Age: 65
DRG: 621 | End: 2025-02-11
Payer: MEDICARE

## 2025-02-11 ENCOUNTER — ANESTHESIA (OUTPATIENT)
Dept: SURGERY | Age: 65
DRG: 621 | End: 2025-02-11
Payer: MEDICARE

## 2025-02-11 ENCOUNTER — HOSPITAL ENCOUNTER (INPATIENT)
Age: 65
LOS: 1 days | Discharge: HOME | DRG: 621 | End: 2025-02-12
Attending: SURGERY | Admitting: SURGERY
Payer: MEDICARE

## 2025-02-11 DIAGNOSIS — I10 PRIMARY HYPERTENSION: ICD-10-CM

## 2025-02-11 DIAGNOSIS — E66.01 MORBID OBESITY: ICD-10-CM

## 2025-02-11 DIAGNOSIS — E78.5 HYPERLIPEMIA: ICD-10-CM

## 2025-02-11 LAB
ABO + RH BLD: NORMAL
BLOOD GROUP ANTIBODIES SERPL: NORMAL
GLUCOSE BLD STRIP.AUTO-MCNC: 151 MG/DL (ref 65–100)
SERVICE CMNT-IMP: ABNORMAL
SPECIMEN EXP DATE BLD: NORMAL

## 2025-02-11 PROCEDURE — 2500000003 HC RX 250 WO HCPCS: Performed by: SURGERY

## 2025-02-11 PROCEDURE — 0DBU4ZX EXCISION OF OMENTUM, PERCUTANEOUS ENDOSCOPIC APPROACH, DIAGNOSTIC: ICD-10-PCS | Performed by: SURGERY

## 2025-02-11 PROCEDURE — 2709999900 HC NON-CHARGEABLE SUPPLY: Performed by: SURGERY

## 2025-02-11 PROCEDURE — 88341 IMHCHEM/IMCYTCHM EA ADD ANTB: CPT

## 2025-02-11 PROCEDURE — 6360000002 HC RX W HCPCS: Performed by: PHYSICIAN ASSISTANT

## 2025-02-11 PROCEDURE — 94760 N-INVAS EAR/PLS OXIMETRY 1: CPT

## 2025-02-11 PROCEDURE — 3700000000 HC ANESTHESIA ATTENDED CARE: Performed by: SURGERY

## 2025-02-11 PROCEDURE — 86900 BLOOD TYPING SEROLOGIC ABO: CPT

## 2025-02-11 PROCEDURE — 86901 BLOOD TYPING SEROLOGIC RH(D): CPT

## 2025-02-11 PROCEDURE — 6370000000 HC RX 637 (ALT 250 FOR IP): Performed by: PHYSICIAN ASSISTANT

## 2025-02-11 PROCEDURE — 6360000002 HC RX W HCPCS: Performed by: SURGERY

## 2025-02-11 PROCEDURE — 6360000002 HC RX W HCPCS: Performed by: NURSE ANESTHETIST, CERTIFIED REGISTERED

## 2025-02-11 PROCEDURE — 88377 M/PHMTRC ALYS ISHQUANT/SEMIQ: CPT

## 2025-02-11 PROCEDURE — 86850 RBC ANTIBODY SCREEN: CPT

## 2025-02-11 PROCEDURE — 97161 PT EVAL LOW COMPLEX 20 MIN: CPT

## 2025-02-11 PROCEDURE — 2580000003 HC RX 258: Performed by: PHYSICIAN ASSISTANT

## 2025-02-11 PROCEDURE — 2720000010 HC SURG SUPPLY STERILE: Performed by: SURGERY

## 2025-02-11 PROCEDURE — 2500000003 HC RX 250 WO HCPCS: Performed by: PHYSICIAN ASSISTANT

## 2025-02-11 PROCEDURE — 43644 LAP GASTRIC BYPASS/ROUX-EN-Y: CPT | Performed by: SURGERY

## 2025-02-11 PROCEDURE — 7100000000 HC PACU RECOVERY - FIRST 15 MIN: Performed by: SURGERY

## 2025-02-11 PROCEDURE — 1100000000 HC RM PRIVATE

## 2025-02-11 PROCEDURE — 2500000003 HC RX 250 WO HCPCS: Performed by: NURSE ANESTHETIST, CERTIFIED REGISTERED

## 2025-02-11 PROCEDURE — 3600000019 HC SURGERY ROBOT ADDTL 15MIN: Performed by: SURGERY

## 2025-02-11 PROCEDURE — 7100000001 HC PACU RECOVERY - ADDTL 15 MIN: Performed by: SURGERY

## 2025-02-11 PROCEDURE — 82962 GLUCOSE BLOOD TEST: CPT

## 2025-02-11 PROCEDURE — S2900 ROBOTIC SURGICAL SYSTEM: HCPCS | Performed by: SURGERY

## 2025-02-11 PROCEDURE — 97530 THERAPEUTIC ACTIVITIES: CPT

## 2025-02-11 PROCEDURE — 3600000009 HC SURGERY ROBOT BASE: Performed by: SURGERY

## 2025-02-11 PROCEDURE — 88305 TISSUE EXAM BY PATHOLOGIST: CPT

## 2025-02-11 PROCEDURE — 6360000002 HC RX W HCPCS: Performed by: ANESTHESIOLOGY

## 2025-02-11 PROCEDURE — 3700000001 HC ADD 15 MINUTES (ANESTHESIA): Performed by: SURGERY

## 2025-02-11 PROCEDURE — 8E0W4CZ ROBOTIC ASSISTED PROCEDURE OF TRUNK REGION, PERCUTANEOUS ENDOSCOPIC APPROACH: ICD-10-PCS | Performed by: SURGERY

## 2025-02-11 PROCEDURE — 0D164ZA BYPASS STOMACH TO JEJUNUM, PERCUTANEOUS ENDOSCOPIC APPROACH: ICD-10-PCS | Performed by: SURGERY

## 2025-02-11 PROCEDURE — 6370000000 HC RX 637 (ALT 250 FOR IP): Performed by: ANESTHESIOLOGY

## 2025-02-11 PROCEDURE — 0DJ08ZZ INSPECTION OF UPPER INTESTINAL TRACT, VIA NATURAL OR ARTIFICIAL OPENING ENDOSCOPIC: ICD-10-PCS | Performed by: SURGERY

## 2025-02-11 PROCEDURE — 88342 IMHCHEM/IMCYTCHM 1ST ANTB: CPT

## 2025-02-11 RX ORDER — OXYCODONE HYDROCHLORIDE 5 MG/1
10 TABLET ORAL PRN
Status: DISCONTINUED | OUTPATIENT
Start: 2025-02-11 | End: 2025-02-11 | Stop reason: HOSPADM

## 2025-02-11 RX ORDER — FAMOTIDINE 20 MG/1
20 TABLET, FILM COATED ORAL ONCE
Status: COMPLETED | OUTPATIENT
Start: 2025-02-11 | End: 2025-02-11

## 2025-02-11 RX ORDER — SODIUM CHLORIDE 0.9 % (FLUSH) 0.9 %
5-40 SYRINGE (ML) INJECTION PRN
Status: DISCONTINUED | OUTPATIENT
Start: 2025-02-11 | End: 2025-02-11 | Stop reason: HOSPADM

## 2025-02-11 RX ORDER — OXYCODONE HYDROCHLORIDE 5 MG/1
5 TABLET ORAL PRN
Status: DISCONTINUED | OUTPATIENT
Start: 2025-02-11 | End: 2025-02-11 | Stop reason: HOSPADM

## 2025-02-11 RX ORDER — SIMETHICONE 80 MG
80 TABLET,CHEWABLE ORAL EVERY 6 HOURS PRN
Status: DISCONTINUED | OUTPATIENT
Start: 2025-02-11 | End: 2025-02-12 | Stop reason: HOSPADM

## 2025-02-11 RX ORDER — DIPHENHYDRAMINE HYDROCHLORIDE 50 MG/ML
12.5 INJECTION INTRAMUSCULAR; INTRAVENOUS
Status: DISCONTINUED | OUTPATIENT
Start: 2025-02-11 | End: 2025-02-11 | Stop reason: HOSPADM

## 2025-02-11 RX ORDER — HEPARIN SODIUM 5000 [USP'U]/ML
5000 INJECTION, SOLUTION INTRAVENOUS; SUBCUTANEOUS EVERY 8 HOURS SCHEDULED
Status: DISCONTINUED | OUTPATIENT
Start: 2025-02-11 | End: 2025-02-12 | Stop reason: HOSPADM

## 2025-02-11 RX ORDER — SCOPOLAMINE 1 MG/3D
1 PATCH, EXTENDED RELEASE TRANSDERMAL
Status: DISCONTINUED | OUTPATIENT
Start: 2025-02-11 | End: 2025-02-12 | Stop reason: HOSPADM

## 2025-02-11 RX ORDER — GLYCOPYRROLATE 0.2 MG/ML
INJECTION INTRAMUSCULAR; INTRAVENOUS
Status: DISCONTINUED | OUTPATIENT
Start: 2025-02-11 | End: 2025-02-11 | Stop reason: SDUPTHER

## 2025-02-11 RX ORDER — SODIUM CHLORIDE AND POTASSIUM CHLORIDE 150; 900 MG/100ML; MG/100ML
INJECTION, SOLUTION INTRAVENOUS CONTINUOUS
Status: DISCONTINUED | OUTPATIENT
Start: 2025-02-11 | End: 2025-02-11

## 2025-02-11 RX ORDER — SODIUM CHLORIDE 9 MG/ML
INJECTION, SOLUTION INTRAVENOUS PRN
Status: DISCONTINUED | OUTPATIENT
Start: 2025-02-11 | End: 2025-02-11 | Stop reason: HOSPADM

## 2025-02-11 RX ORDER — ONDANSETRON 2 MG/ML
4 INJECTION INTRAMUSCULAR; INTRAVENOUS
Status: DISCONTINUED | OUTPATIENT
Start: 2025-02-11 | End: 2025-02-11 | Stop reason: HOSPADM

## 2025-02-11 RX ORDER — SODIUM CHLORIDE AND POTASSIUM CHLORIDE 150; 900 MG/100ML; MG/100ML
INJECTION, SOLUTION INTRAVENOUS CONTINUOUS
Status: DISCONTINUED | OUTPATIENT
Start: 2025-02-11 | End: 2025-02-12 | Stop reason: HOSPADM

## 2025-02-11 RX ORDER — SODIUM CHLORIDE, SODIUM LACTATE, POTASSIUM CHLORIDE, CALCIUM CHLORIDE 600; 310; 30; 20 MG/100ML; MG/100ML; MG/100ML; MG/100ML
INJECTION, SOLUTION INTRAVENOUS CONTINUOUS
Status: DISCONTINUED | OUTPATIENT
Start: 2025-02-11 | End: 2025-02-11 | Stop reason: HOSPADM

## 2025-02-11 RX ORDER — ONDANSETRON 2 MG/ML
INJECTION INTRAMUSCULAR; INTRAVENOUS
Status: DISCONTINUED | OUTPATIENT
Start: 2025-02-11 | End: 2025-02-11 | Stop reason: SDUPTHER

## 2025-02-11 RX ORDER — SODIUM CHLORIDE 0.9 % (FLUSH) 0.9 %
5-40 SYRINGE (ML) INJECTION EVERY 12 HOURS SCHEDULED
Status: DISCONTINUED | OUTPATIENT
Start: 2025-02-11 | End: 2025-02-12 | Stop reason: HOSPADM

## 2025-02-11 RX ORDER — OXYCODONE HYDROCHLORIDE 5 MG/1
5 TABLET ORAL EVERY 4 HOURS PRN
Status: DISCONTINUED | OUTPATIENT
Start: 2025-02-11 | End: 2025-02-12 | Stop reason: HOSPADM

## 2025-02-11 RX ORDER — KETAMINE HCL IN NACL, ISO-OSM 20 MG/2 ML
SYRINGE (ML) INJECTION
Status: DISCONTINUED | OUTPATIENT
Start: 2025-02-11 | End: 2025-02-11 | Stop reason: SDUPTHER

## 2025-02-11 RX ORDER — GABAPENTIN 300 MG/1
300 CAPSULE ORAL 3 TIMES DAILY
Status: DISCONTINUED | OUTPATIENT
Start: 2025-02-11 | End: 2025-02-12 | Stop reason: HOSPADM

## 2025-02-11 RX ORDER — DIPHENHYDRAMINE HYDROCHLORIDE 50 MG/ML
25 INJECTION INTRAMUSCULAR; INTRAVENOUS EVERY 6 HOURS PRN
Status: DISCONTINUED | OUTPATIENT
Start: 2025-02-11 | End: 2025-02-12 | Stop reason: HOSPADM

## 2025-02-11 RX ORDER — PROPOFOL 10 MG/ML
INJECTION, EMULSION INTRAVENOUS
Status: DISCONTINUED | OUTPATIENT
Start: 2025-02-11 | End: 2025-02-11 | Stop reason: SDUPTHER

## 2025-02-11 RX ORDER — LIDOCAINE HYDROCHLORIDE 20 MG/ML
INJECTION, SOLUTION EPIDURAL; INFILTRATION; INTRACAUDAL; PERINEURAL
Status: DISCONTINUED | OUTPATIENT
Start: 2025-02-11 | End: 2025-02-11 | Stop reason: SDUPTHER

## 2025-02-11 RX ORDER — PROCHLORPERAZINE EDISYLATE 5 MG/ML
10 INJECTION INTRAMUSCULAR; INTRAVENOUS EVERY 6 HOURS PRN
Status: DISCONTINUED | OUTPATIENT
Start: 2025-02-11 | End: 2025-02-12 | Stop reason: HOSPADM

## 2025-02-11 RX ORDER — BUPIVACAINE HYDROCHLORIDE 5 MG/ML
INJECTION, SOLUTION EPIDURAL; INTRACAUDAL PRN
Status: DISCONTINUED | OUTPATIENT
Start: 2025-02-11 | End: 2025-02-11 | Stop reason: HOSPADM

## 2025-02-11 RX ORDER — FENTANYL CITRATE 50 UG/ML
INJECTION, SOLUTION INTRAMUSCULAR; INTRAVENOUS
Status: DISCONTINUED | OUTPATIENT
Start: 2025-02-11 | End: 2025-02-11 | Stop reason: SDUPTHER

## 2025-02-11 RX ORDER — PHENYLEPHRINE HYDROCHLORIDE 10 MG/ML
INJECTION INTRAVENOUS
Status: DISCONTINUED | OUTPATIENT
Start: 2025-02-11 | End: 2025-02-11 | Stop reason: SDUPTHER

## 2025-02-11 RX ORDER — DIPHENHYDRAMINE HCL 25 MG
25 CAPSULE ORAL EVERY 6 HOURS PRN
Status: DISCONTINUED | OUTPATIENT
Start: 2025-02-11 | End: 2025-02-12 | Stop reason: HOSPADM

## 2025-02-11 RX ORDER — HEPARIN SODIUM 5000 [USP'U]/ML
5000 INJECTION, SOLUTION INTRAVENOUS; SUBCUTANEOUS ONCE
Status: COMPLETED | OUTPATIENT
Start: 2025-02-11 | End: 2025-02-11

## 2025-02-11 RX ORDER — FENTANYL CITRATE 50 UG/ML
100 INJECTION, SOLUTION INTRAMUSCULAR; INTRAVENOUS
Status: DISCONTINUED | OUTPATIENT
Start: 2025-02-11 | End: 2025-02-11 | Stop reason: HOSPADM

## 2025-02-11 RX ORDER — ACETAMINOPHEN 325 MG/1
650 TABLET ORAL EVERY 6 HOURS
Status: DISCONTINUED | OUTPATIENT
Start: 2025-02-11 | End: 2025-02-12 | Stop reason: HOSPADM

## 2025-02-11 RX ORDER — METOCLOPRAMIDE HYDROCHLORIDE 5 MG/ML
10 INJECTION INTRAMUSCULAR; INTRAVENOUS ONCE
Status: COMPLETED | OUTPATIENT
Start: 2025-02-11 | End: 2025-02-11

## 2025-02-11 RX ORDER — MENTHOL/CAMPHOR/ALLANTOIN/PHE 0.6-0.5-1%
OINTMENT(EA) TOPICAL PRN
Status: DISCONTINUED | OUTPATIENT
Start: 2025-02-11 | End: 2025-02-12 | Stop reason: HOSPADM

## 2025-02-11 RX ORDER — LIDOCAINE HYDROCHLORIDE ANHYDROUS AND DEXTROSE MONOHYDRATE 5; 400 G/100ML; MG/100ML
1 INJECTION, SOLUTION INTRAVENOUS CONTINUOUS
Status: ACTIVE | OUTPATIENT
Start: 2025-02-11 | End: 2025-02-12

## 2025-02-11 RX ORDER — ROCURONIUM BROMIDE 10 MG/ML
INJECTION, SOLUTION INTRAVENOUS
Status: DISCONTINUED | OUTPATIENT
Start: 2025-02-11 | End: 2025-02-11 | Stop reason: SDUPTHER

## 2025-02-11 RX ORDER — NALOXONE HYDROCHLORIDE 0.4 MG/ML
INJECTION, SOLUTION INTRAMUSCULAR; INTRAVENOUS; SUBCUTANEOUS PRN
Status: DISCONTINUED | OUTPATIENT
Start: 2025-02-11 | End: 2025-02-11 | Stop reason: HOSPADM

## 2025-02-11 RX ORDER — SODIUM CHLORIDE 0.9 % (FLUSH) 0.9 %
5-40 SYRINGE (ML) INJECTION PRN
Status: DISCONTINUED | OUTPATIENT
Start: 2025-02-11 | End: 2025-02-12 | Stop reason: HOSPADM

## 2025-02-11 RX ORDER — HYDRALAZINE HYDROCHLORIDE 20 MG/ML
10 INJECTION INTRAMUSCULAR; INTRAVENOUS EVERY 6 HOURS PRN
Status: DISCONTINUED | OUTPATIENT
Start: 2025-02-11 | End: 2025-02-12 | Stop reason: HOSPADM

## 2025-02-11 RX ORDER — SUCRALFATE 1 G/1
1 TABLET ORAL EVERY 6 HOURS SCHEDULED
Status: DISCONTINUED | OUTPATIENT
Start: 2025-02-11 | End: 2025-02-12 | Stop reason: HOSPADM

## 2025-02-11 RX ORDER — SODIUM CHLORIDE 9 MG/ML
INJECTION, SOLUTION INTRAVENOUS CONTINUOUS
Status: DISCONTINUED | OUTPATIENT
Start: 2025-02-11 | End: 2025-02-11 | Stop reason: HOSPADM

## 2025-02-11 RX ORDER — HYDROMORPHONE HYDROCHLORIDE 1 MG/ML
0.5 INJECTION, SOLUTION INTRAMUSCULAR; INTRAVENOUS; SUBCUTANEOUS
Status: DISCONTINUED | OUTPATIENT
Start: 2025-02-11 | End: 2025-02-12 | Stop reason: HOSPADM

## 2025-02-11 RX ORDER — LIDOCAINE HYDROCHLORIDE 10 MG/ML
1 INJECTION, SOLUTION INFILTRATION; PERINEURAL
Status: DISCONTINUED | OUTPATIENT
Start: 2025-02-11 | End: 2025-02-11 | Stop reason: HOSPADM

## 2025-02-11 RX ORDER — MIDAZOLAM HYDROCHLORIDE 2 MG/2ML
2 INJECTION, SOLUTION INTRAMUSCULAR; INTRAVENOUS
Status: DISCONTINUED | OUTPATIENT
Start: 2025-02-11 | End: 2025-02-11 | Stop reason: HOSPADM

## 2025-02-11 RX ORDER — NEOSTIGMINE METHYLSULFATE 1 MG/ML
INJECTION INTRAVENOUS
Status: DISCONTINUED | OUTPATIENT
Start: 2025-02-11 | End: 2025-02-11 | Stop reason: SDUPTHER

## 2025-02-11 RX ORDER — SODIUM CHLORIDE 0.9 % (FLUSH) 0.9 %
5-40 SYRINGE (ML) INJECTION EVERY 12 HOURS SCHEDULED
Status: DISCONTINUED | OUTPATIENT
Start: 2025-02-11 | End: 2025-02-11 | Stop reason: HOSPADM

## 2025-02-11 RX ORDER — MIDAZOLAM HYDROCHLORIDE 1 MG/ML
INJECTION, SOLUTION INTRAMUSCULAR; INTRAVENOUS
Status: DISCONTINUED | OUTPATIENT
Start: 2025-02-11 | End: 2025-02-11 | Stop reason: SDUPTHER

## 2025-02-11 RX ORDER — ONDANSETRON 2 MG/ML
4 INJECTION INTRAMUSCULAR; INTRAVENOUS EVERY 6 HOURS PRN
Status: DISCONTINUED | OUTPATIENT
Start: 2025-02-11 | End: 2025-02-12 | Stop reason: HOSPADM

## 2025-02-11 RX ORDER — SODIUM CHLORIDE 9 MG/ML
INJECTION, SOLUTION INTRAVENOUS PRN
Status: DISCONTINUED | OUTPATIENT
Start: 2025-02-11 | End: 2025-02-12 | Stop reason: HOSPADM

## 2025-02-11 RX ORDER — ACETAMINOPHEN 500 MG
1000 TABLET ORAL ONCE
Status: DISCONTINUED | OUTPATIENT
Start: 2025-02-11 | End: 2025-02-11 | Stop reason: HOSPADM

## 2025-02-11 RX ORDER — KETOROLAC TROMETHAMINE 30 MG/ML
30 INJECTION, SOLUTION INTRAMUSCULAR; INTRAVENOUS EVERY 6 HOURS
Status: COMPLETED | OUTPATIENT
Start: 2025-02-11 | End: 2025-02-11

## 2025-02-11 RX ORDER — LIDOCAINE HYDROCHLORIDE ANHYDROUS AND DEXTROSE MONOHYDRATE 5; 400 G/100ML; MG/100ML
INJECTION, SOLUTION INTRAVENOUS
Status: DISCONTINUED | OUTPATIENT
Start: 2025-02-11 | End: 2025-02-11 | Stop reason: SDUPTHER

## 2025-02-11 RX ADMIN — ONDANSETRON 4 MG: 2 INJECTION, SOLUTION INTRAMUSCULAR; INTRAVENOUS at 09:03

## 2025-02-11 RX ADMIN — FAMOTIDINE 20 MG: 20 TABLET, FILM COATED ORAL at 06:20

## 2025-02-11 RX ADMIN — GABAPENTIN 300 MG: 300 CAPSULE ORAL at 22:04

## 2025-02-11 RX ADMIN — FENTANYL CITRATE 100 MCG: 50 INJECTION, SOLUTION INTRAMUSCULAR; INTRAVENOUS at 07:36

## 2025-02-11 RX ADMIN — HYDROMORPHONE HYDROCHLORIDE 0.5 MG: 1 INJECTION, SOLUTION INTRAMUSCULAR; INTRAVENOUS; SUBCUTANEOUS at 10:02

## 2025-02-11 RX ADMIN — THIAMINE HYDROCHLORIDE: 100 INJECTION, SOLUTION INTRAMUSCULAR; INTRAVENOUS at 13:36

## 2025-02-11 RX ADMIN — METOCLOPRAMIDE 10 MG: 5 INJECTION, SOLUTION INTRAMUSCULAR; INTRAVENOUS at 06:09

## 2025-02-11 RX ADMIN — SODIUM CHLORIDE, SODIUM LACTATE, POTASSIUM CHLORIDE, AND CALCIUM CHLORIDE: 600; 310; 30; 20 INJECTION, SOLUTION INTRAVENOUS at 07:30

## 2025-02-11 RX ADMIN — LIDOCAINE HYDROCHLORIDE 100 MG: 20 INJECTION, SOLUTION EPIDURAL; INFILTRATION; INTRACAUDAL; PERINEURAL at 07:36

## 2025-02-11 RX ADMIN — OXYCODONE 5 MG: 5 TABLET ORAL at 11:08

## 2025-02-11 RX ADMIN — GLYCOPYRROLATE 0.4 MG: 0.2 INJECTION INTRAMUSCULAR; INTRAVENOUS at 09:16

## 2025-02-11 RX ADMIN — SUCRALFATE 1 G: 1 TABLET ORAL at 17:09

## 2025-02-11 RX ADMIN — ACETAMINOPHEN 650 MG: 325 TABLET, FILM COATED ORAL at 22:03

## 2025-02-11 RX ADMIN — KETOROLAC TROMETHAMINE 30 MG: 30 INJECTION, SOLUTION INTRAMUSCULAR; INTRAVENOUS at 22:03

## 2025-02-11 RX ADMIN — ROCURONIUM BROMIDE 5 MG: 10 INJECTION, SOLUTION INTRAVENOUS at 08:48

## 2025-02-11 RX ADMIN — GABAPENTIN 300 MG: 300 CAPSULE ORAL at 14:29

## 2025-02-11 RX ADMIN — KETOROLAC TROMETHAMINE 30 MG: 30 INJECTION, SOLUTION INTRAMUSCULAR; INTRAVENOUS at 11:08

## 2025-02-11 RX ADMIN — ROCURONIUM BROMIDE 50 MG: 10 INJECTION, SOLUTION INTRAVENOUS at 07:36

## 2025-02-11 RX ADMIN — LIDOCAINE HYDROCHLORIDE 1 MG/KG/HR: 4 INJECTION, SOLUTION INTRAVENOUS at 09:43

## 2025-02-11 RX ADMIN — HEPARIN SODIUM 5000 UNITS: 5000 INJECTION INTRAVENOUS; SUBCUTANEOUS at 14:29

## 2025-02-11 RX ADMIN — HEPARIN SODIUM 5000 UNITS: 5000 INJECTION INTRAVENOUS; SUBCUTANEOUS at 22:04

## 2025-02-11 RX ADMIN — PHENYLEPHRINE HYDROCHLORIDE 100 MCG: 10 INJECTION INTRAVENOUS at 09:07

## 2025-02-11 RX ADMIN — ACETAMINOPHEN 650 MG: 325 TABLET, FILM COATED ORAL at 17:09

## 2025-02-11 RX ADMIN — SODIUM CHLORIDE, SODIUM LACTATE, POTASSIUM CHLORIDE, AND CALCIUM CHLORIDE: 600; 310; 30; 20 INJECTION, SOLUTION INTRAVENOUS at 08:58

## 2025-02-11 RX ADMIN — PROPOFOL 200 MG: 10 INJECTION, EMULSION INTRAVENOUS at 07:36

## 2025-02-11 RX ADMIN — ROCURONIUM BROMIDE 10 MG: 10 INJECTION, SOLUTION INTRAVENOUS at 08:18

## 2025-02-11 RX ADMIN — Medication 40 MG: at 07:36

## 2025-02-11 RX ADMIN — POTASSIUM CHLORIDE AND SODIUM CHLORIDE: 900; 150 INJECTION, SOLUTION INTRAVENOUS at 22:03

## 2025-02-11 RX ADMIN — PHENYLEPHRINE HYDROCHLORIDE 50 MCG: 10 INJECTION INTRAVENOUS at 08:50

## 2025-02-11 RX ADMIN — SUCRALFATE 1 G: 1 TABLET ORAL at 12:25

## 2025-02-11 RX ADMIN — PHENYLEPHRINE HYDROCHLORIDE 50 MCG: 10 INJECTION INTRAVENOUS at 08:11

## 2025-02-11 RX ADMIN — PHENYLEPHRINE HYDROCHLORIDE 50 MCG: 10 INJECTION INTRAVENOUS at 07:55

## 2025-02-11 RX ADMIN — CEFAZOLIN 3000 MG: 3 INJECTION, POWDER, FOR SOLUTION INTRAMUSCULAR; INTRAVENOUS at 07:49

## 2025-02-11 RX ADMIN — MIDAZOLAM 2 MG: 1 INJECTION INTRAMUSCULAR; INTRAVENOUS at 07:23

## 2025-02-11 RX ADMIN — PHENYLEPHRINE HYDROCHLORIDE 50 MCG: 10 INJECTION INTRAVENOUS at 08:46

## 2025-02-11 RX ADMIN — ACETAMINOPHEN 650 MG: 325 TABLET, FILM COATED ORAL at 12:25

## 2025-02-11 RX ADMIN — HYDROMORPHONE HYDROCHLORIDE 0.5 MG: 1 INJECTION, SOLUTION INTRAMUSCULAR; INTRAVENOUS; SUBCUTANEOUS at 09:52

## 2025-02-11 RX ADMIN — SODIUM CHLORIDE, PRESERVATIVE FREE 10 ML: 5 INJECTION INTRAVENOUS at 22:04

## 2025-02-11 RX ADMIN — PHENYLEPHRINE HYDROCHLORIDE 50 MCG: 10 INJECTION INTRAVENOUS at 08:52

## 2025-02-11 RX ADMIN — SUCRALFATE 1 G: 1 TABLET ORAL at 22:04

## 2025-02-11 RX ADMIN — NEOSTIGMINE METHYLSULFATE 3 MG: 1 INJECTION, SOLUTION INTRAVENOUS at 09:16

## 2025-02-11 RX ADMIN — PHENYLEPHRINE HYDROCHLORIDE 50 MCG: 10 INJECTION INTRAVENOUS at 08:56

## 2025-02-11 RX ADMIN — PHENYLEPHRINE HYDROCHLORIDE 50 MCG: 10 INJECTION INTRAVENOUS at 07:49

## 2025-02-11 RX ADMIN — HEPARIN SODIUM 5000 UNITS: 5000 INJECTION INTRAVENOUS; SUBCUTANEOUS at 06:07

## 2025-02-11 RX ADMIN — KETOROLAC TROMETHAMINE 30 MG: 30 INJECTION, SOLUTION INTRAMUSCULAR; INTRAVENOUS at 17:09

## 2025-02-11 RX ADMIN — LIDOCAINE HYDROCHLORIDE 1.5 MG/KG/HR: 4 INJECTION, SOLUTION INTRAVENOUS at 07:40

## 2025-02-11 ASSESSMENT — PAIN - FUNCTIONAL ASSESSMENT: PAIN_FUNCTIONAL_ASSESSMENT: 0-10

## 2025-02-11 ASSESSMENT — PAIN DESCRIPTION - LOCATION
LOCATION: ABDOMEN

## 2025-02-11 ASSESSMENT — PAIN SCALES - GENERAL
PAINLEVEL_OUTOF10: 0
PAINLEVEL_OUTOF10: 10
PAINLEVEL_OUTOF10: 10
PAINLEVEL_OUTOF10: 9

## 2025-02-11 ASSESSMENT — PAIN DESCRIPTION - ORIENTATION: ORIENTATION: MID;LEFT;RIGHT

## 2025-02-11 ASSESSMENT — PAIN DESCRIPTION - DESCRIPTORS
DESCRIPTORS: ACHING
DESCRIPTORS: ACHING

## 2025-02-11 NOTE — PROGRESS NOTES
Pt received to Rm 334. Pt alert and oriented. Oriented pt to room and bed controls. Family at bedside.

## 2025-02-11 NOTE — CARE COORDINATION
Admitted 2/11 for weight loss surgery. Assessment completed with patient at bedside. Lives with spouse at address on file. DME includes walker, wheelchair and bedside commode. Is on disability with income of about $1100 per month. Interested in home aide. [facility] ken initiated online for patient- she is aware. No previous HH or SNF. Anticipate routine DC when medically stable.     [place] confirmation number  [id number]        02/11/25 6545   Service Assessment   Patient Orientation Alert and Oriented;Person;Place;Situation   Cognition Alert   History Provided By Patient   Primary Caregiver Self   Accompanied By/Relationship spouse   Support Systems Spouse/Significant Other   Patient's Healthcare Decision Maker is: Legal Next of Kin   PCP Verified by CM Yes   Prior Functional Level Independent in ADLs/IADLs   Current Functional Level Independent in ADLs/IADLs   Can patient return to prior living arrangement Yes   Ability to make needs known: Good   Family able to assist with home care needs: Yes   Would you like for me to discuss the discharge plan with any other family members/significant others, and if so, who? Yes  (spouse)   Financial Resources Medicare   Community Resources None   CM/SW Referral Other (see comment)  (Discharge planning)   Condition of Participation: Discharge Planning   The Plan for Transition of Care is related to the following treatment goals: Anticipate routine DC when medically stable.

## 2025-02-11 NOTE — INTERVAL H&P NOTE
Update History & Physical    The patient's History and Physical of January 22, robotic assisted gastric bypass, possible hiatal hernia repair, EGD was reviewed with the patient and I examined the patient. There was no change. The surgical site was confirmed by the patient and me.     Plan: The risks, benefits, expected outcome, and alternative to the recommended procedure have been discussed with the patient. Patient understands and wants to proceed with the procedure.     Electronically signed by Lizbeth Zurita MD on 2/11/2025 at 7:16 AM

## 2025-02-11 NOTE — PROGRESS NOTES
ACUTE PHYSICAL THERAPY GOALS:   (Developed with and agreed upon by patient and/or caregiver.)    LTG:  (1.)Ms. Garduno will move from supine to sit and sit to supine  in bed with MINIMAL ASSIST within 7 treatment day(s).    (2.)Ms. Garduno will transfer from bed to chair and chair to bed with CONTACT GUARD ASSIST using the least restrictive device within 7 treatment day(s).    (3.)Ms. Garduno will ambulate with CONTACT GUARD ASSIST for 50 feet with the least restrictive device within 7 treatment day(s).  (4)Ms. Garduno will perform HEP with cues to increase functional mobility in 7 days.  ________________________________________________________________________________________________      PHYSICAL THERAPY Initial Assessment and PM  (Link to Caseload Tracking: PT Visit Days : 1  Acknowledge Orders  Time In/Out  PT Charge Capture  Rehab Caseload Tracker    Bindu Garduno is a 64 y.o. female   PRIMARY DIAGNOSIS: Morbid obesity  Morbid obesity [E66.01]  Primary hypertension [I10]  Hyperlipemia [E78.5]  Procedure(s) (LRB):  ERAS/ GASTRIC BYPASS MATEO-EN-Y LAPAROSCOPIC ROBOTIC/ OMENTUM BIOPSY (N/A)  ESOPHAGOGASTRODUODENOSCOPY (N/A)  Day of Surgery  Reason for Referral: Generalized Muscle Weakness (M62.81)  Other abnormalities of gait and mobility (R26.89)  Inpatient: Payor: [employer] MEDICARE / Plan: [employer] CHOICE-PPO MEDICARE / Product Type: *No Product type* /     ASSESSMENT:     REHAB RECOMMENDATIONS:   Recommendation to date pending progress:  Setting:  Home Health Therapy    Equipment:     Has  and BSC     ASSESSMENT:  Ms. Garduno presents with decreased functional mobility s/p gastric bypass.  She lives with Yury and normally uses a walker for short distance gait only due to her knees, some assist with adl's.    This afternoon, she agreed to get up.  She refused sitting in chair.  She needed assistance with all functional mobility most significantly bed mobility. She is having some pain with mobility. She took some steps in

## 2025-02-11 NOTE — PROGRESS NOTES
TRANSFER - IN REPORT:    Verbal report received from Kiana on Bindu Garduno  being received from PACU for routine post-op      Report consisted of patient's Situation, Background, Assessment and   Recommendations(SBAR).     Information from the following report(s) Nurse Handoff Report was reviewed with the receiving nurse.    Opportunity for questions and clarification was provided.      Assessment completed upon patient's arrival to unit and care assumed.

## 2025-02-11 NOTE — ANESTHESIA PRE PROCEDURE
Department of Anesthesiology  Preprocedure Note       Name:  Bindu Garduno   Age:  64 y.o.  :  1960                                          MRN:  [id number]         Date:  2025      Surgeon: Surgeon(s):  Lizbeth Almonte MD    Procedure: Procedure(s):  ERAS/ GASTRIC BYPASS MATEO-EN-Y LAPAROSCOPIC ROBOTIC  ESOPHAGOGASTRODUODENOSCOPY  POSSIBLE HERNIA HIATAL REPAIR    Medications prior to admission:   Prior to Admission medications    Medication Sig Start Date End Date Taking? Authorizing Provider   acetaminophen (TYLENOL) 500 MG tablet Take 1 tablet by mouth every 6 hours as needed for Pain   Yes Provider, MD Anisha   ibuprofen (ADVIL;MOTRIN) 800 MG tablet Take 1 tablet by mouth every 6 hours as needed for Pain 25  Yes Miriam Herrera PA   ferrous sulfate (IRON 325) 325 (65 Fe) MG tablet Take 1 tablet by mouth daily 24  Yes Provider, MD Anisha   levothyroxine (SYNTHROID) 25 MCG tablet Take 1 tablet by mouth daily 24  Yes Provider, Historical, MD   lovastatin (MEVACOR) 40 MG tablet Take 2 tablets by mouth 24  Yes Provider, Historical, MD   potassium chloride (KLOR-CON) 10 MEQ extended release tablet Take 1 tablet by mouth daily 24  Yes Provider, Historical, MD   omeprazole (PRILOSEC) 40 MG delayed release capsule Take 1 capsule by mouth daily   Yes Provider, Historical, MD   furosemide (LASIX) 20 MG tablet Take 1 tablet by mouth 2 times daily   Yes Provider, Historical, MD   buPROPion (WELLBUTRIN) 75 MG tablet Take 1 tablet by mouth 2 times daily   Yes Provider, Historical, MD   folic acid (FOLVITE) 1 MG tablet Take 1 tablet by mouth daily   Yes Provider, Historical, MD   Icosapent Ethyl (VASCEPA) 1 g CAPS capsule Take 2 capsules by mouth 2 times daily   Yes Provider, Historical, MD   sertraline (ZOLOFT) 100 MG tablet Take 1 tablet by mouth daily   Yes Provider, Historical, MD   amLODIPine (NORVASC) 10 MG tablet Take 1 tablet by mouth daily   Yes

## 2025-02-11 NOTE — OP NOTE
Operative Note      Patient: Bindu Garduno  YOB: 1960  MRN: [id number]    Date of Procedure: 2/11/2025    Pre-Op Diagnosis: Morbid obesity [E66.01]  Primary hypertension [I10]  Hyperlipemia [E78.5]    Post-Op Diagnosis: Same       Procedure(s):  ERAS/ GASTRIC BYPASS MATEO-EN-Y LAPAROSCOPIC ROBOTIC/ OMENTUM BIOPSY  ESOPHAGOGASTRODUODENOSCOPY  66285    Surgeon(s):  Lizbeth Almonte MD    Assistant:   * No surgical staff found *    Anesthesia: General    Estimated Blood Loss (mL): Minimal    Complications: None    Specimens:   ID Type Source Tests Collected by Time Destination   A : omentum biopsy Tissue Tissue SURGICAL PATHOLOGY Lizbeth Almonte MD 2/11/2025 0829        Implants:  * No implants in log *      Drains: * No LDAs found *    Findings: tiny hiatal hernia left intact. Tiny abnormal fat in omentum biopsied. Normal appearing intra-abdominal anatomy. Negative leak test. Normal appearing gastric and esophageal mucousa without staple line bleeding.    Statement of Medical Necessity: Bindu Garduno is a 64 y.o. female who presented to the clinic with history of morbid obesity and Body mass index is 48.7 kg/m².. She failed multiple weight loss attempts meets the NIH criteria for obesity surgery. She decided for a robotic assisted laparoscopic vs open gastric bypass and EGD. We discussed specific risks of gastric bypass including but not limited to: pain, infection, bleeding, scar, excess skin, conversion to open approach, hernia, injury to adjacent organs, need for blood transfusion, thromboembolic complications, gastrointestinal leak, stricture, difficulty swallowing, need for gastrostomy/feeding tube, gastroesophageal reflux, esophagitis, need for revisional surgery, failure to lose weight or weight regain, nutritional deficiencies, heart attack, stroke, death. Patient understood and agreed to proceed.     Detailed Description of Procedure:  After informed consent was  retracted cephalad and then divided with the Vessel Sealer. The transverse colon and the ligament of Treitz were identified. The small bowel was measured approximately 100 cm distal to the ligament of Treitz and brought up antecolic and checked for length. The Mihaela limb was found to adequately reach the level of the gastric pouch in an omega loop configuration. A gastrostomy and enterotomy were made in the gastric pouch and the jejunum using a hot scissors. A posterior gastroenterotomy was created by firing a Sure Form blue load stapler. Two 2-0 Vicryl sutures were placed on each side of the gastrojejunostomy and they were used to close the common enterotomy in two layers. We then created a window in the mesentery of the small bowel and divided the mihaela limb near the gastrojejunostomy to avoid candy cane syndrome using white loaded Sure Form stapler. Next, the Mihaela limb was measured to approximately 120 cm and a stapled jejunojejunostomy created with the biliopancreatic limb using a white loaded Sure Form. The common enterostomy was closed with a double layer of running 2-0 Vicryl sutures. The resulting mesenteric defect was closed with a running 2-0 Silk suture.     Holding distal obstruction with a clamp, an EGD was advanced and a leak test perfomed with inflation while holding the gastrojejunostomy under sterile saline. Neither a leak nor obstruction were observed. Normal stomach and jejunal mucosa were observed without staple line bleeding. Next, the EGD was removed while suctioning. The gastrojejunostomy was observed and found to be tension free. The intraperitoneal saline was aspirated. Hemostasis was assured. All robotic instruments were removed and the robot was undocked. The liver retractor was removed under direct vision. VistaSeal was placed over the gastrojejunostomy and the remnant stomach staple line. The fascia of the 12 mm port sites was closed using 0-Vicryl ties and an endoclosure device.

## 2025-02-11 NOTE — PROGRESS NOTES
4 Eyes Skin Assessment     NAME:  Bindu Garduno  YOB: 1960  MEDICAL RECORD NUMBER:  [id number]    The patient is being assessed for  Post-Op Surgical    I agree that at least one RN has performed a thorough Head to Toe Skin Assessment on the patient. ALL assessment sites listed below have been assessed.      Areas assessed by both nurses:    Head, Face, Ears, Shoulders, Back, Chest, Arms, Elbows, Hands, Sacrum. Buttock, Coccyx, Ischium, Legs. Feet and Heels, and Under Medical Devices         Does the Patient have a Wound? No noted wound(s)       Bonifacio Prevention initiated by RN: Yes  Wound Care Orders initiated by RN: No    Pressure Injury (Stage 3,4, Unstageable, DTI, NWPT, and Complex wounds) if present, place Wound referral order by RN under : No    New Ostomies, if present place, Ostomy referral order under : No     Nurse 1 eSignature: Electronically signed by DYLAN DELATORRE RN on 2/11/25 at 1:52 PM EST    **SHARE this note so that the co-signing nurse can place an eSignature**    Nurse 2 eSignature: Electronically signed by Kiana Aldana RN on 2/11/25 at 1:57 PM EST

## 2025-02-11 NOTE — RT PROTOCOL NOTE
Respiratory Care Services     Policy Number: [id number]    Title: Oxygen Protocol    Effective Date: 01/1996    Revised Date: 06/2013, 02/29/2016, 4/2018, 7/2019    Reviewed Date: 05/2014, 03/2015, 06/2017, 11/2020        I.  Policy: The Oxygen Protocol will be initiated for all patients upon written order from physician for administration of oxygen therapy or if a patient is found to have an oxygen saturation of 88% or less. Special consideration: the goal of oxygen therapy for COPD patients is to maintain oxygen saturation between 88% - 92% to comply with GOLD Guidelines.    II. Purpose: To provide protocol driven respiratory therapy for the administration of oxygen at concentrations greater than that in ambient air with the intent of treating or preventing the symptoms and manifestations of hypoxia.    III. Responsibility: Director Respiratory Care Services, all Respiratory Care Practitioners     IV. Indications:   Implement this protocol for patients when physician orders oxygen to be administered or when patient is found to have an oxygen saturation of 88% or less.  To assure routine monitoring of patient's oxygen saturation b.i.d. and to make appropriate adjustments in accordance with ordered oxygen saturation parameters.  To assure continuity of respiratory care that meets [facility] Clinical Practice Guidelines and GOLD Guidelines.  Hb < 8  Sickle Cell anemia crisis    V. Assessment:  Assess the following parameters to determine the need to adjust oxygen:  Measurement of patient's oxygen saturation via pulse oximetry.    Observation of patient's color, respiratory effort, and responsiveness.  Measurement of heart rate and respiratory rate.  Complete a three-step ambulatory oxygen saturation when ordered.    VI. Initiation:  Upon receipt of an order for oxygen, the RCP will:   Verify order in the patient's EMR, which should include the desired oxygen saturation to be maintained.  The patient shall be placed on  authorization.    ___________________  Date    _____________________________________        ________________________  Signature of Patient or Parent (of minor patient,                  Relationship (if other than Patient)  alf parent) if applicable. Closest Relative,  Guardian or legal Representative    _____________________________________  Witness  Legal representative is defined as person named by the court as a guardian, executor or , or having power of  specifically set forth to authorize this action.    [phone]3/02, 7/14[facility]   Consent and Release for Home Ventilator

## 2025-02-11 NOTE — PERIOP NOTE
MD Leung at bedside with patient. Pt VSS stable. Pain and Nausea controlled at this time. Verbal sign out per MD when pacu care is completed. Plan of care continues.

## 2025-02-11 NOTE — ANESTHESIA POSTPROCEDURE EVALUATION
Department of Anesthesiology  Postprocedure Note    Patient: Bindu Garduno  MRN: [id number]  YOB: 1960  Date of evaluation: 2/11/2025    Procedure Summary       Date: 02/11/25 [place] [facility] [place] [facility] [place]    Anesthesia Start: 0721 Anesthesia Stop: 0940    Procedures:       ERAS/ GASTRIC BYPASS MATEO-EN-Y LAPAROSCOPIC ROBOTIC/ OMENTUM BIOPSY (Abdomen)      ESOPHAGOGASTRODUODENOSCOPY (Esophagus) Diagnosis:       Morbid obesity      Primary hypertension      Hyperlipemia      (Morbid obesity [E66.01])      (Primary hypertension [I10])      (Hyperlipemia [E78.5])    Surgeons: Lizbeth Almonte MD Responsible Provider: Renata Leung MD    Anesthesia Type: general ASA Status: 3            Anesthesia Type: No value filed.    Lillian Phase I: Lillian Score: 9    Lillian Phase II:      Anesthesia Post Evaluation    Patient location during evaluation: PACU  Patient participation: complete - patient participated  Level of consciousness: awake and alert  Airway patency: patent  Nausea & Vomiting: no nausea  Cardiovascular status: hemodynamically stable  Respiratory status: acceptable  Hydration status: euvolemic  Pain management: adequate and satisfactory to patient        No notable events documented.

## 2025-02-11 NOTE — PERIOP NOTE
TRANSFER - OUT REPORT:    Verbal report given to URBAN Mason on Bindu Garduno  being transferred to room 334 for routine progression of patient care       Report consisted of patient's Situation, Background, Assessment and   Recommendations(SBAR).     Information from the following report(s) Nurse Handoff Report, Adult Overview, and MAR was reviewed with the receiving nurse.           Lines:   Peripheral IV 02/11/25 Posterior;Right Hand (Active)   Site Assessment Clean, dry & intact 02/11/25 0939   Line Status Infusing 02/11/25 0939   Line Care Connections checked and tightened 02/11/25 0939   Phlebitis Assessment No symptoms 02/11/25 0939   Infiltration Assessment 0 02/11/25 0939   Alcohol Cap Used No 02/11/25 0939   Dressing Status Clean, dry & intact 02/11/25 0939   Dressing Type Transparent 02/11/25 0939        Opportunity for questions and clarification was provided.      Patient transported with:  O2 @ 0lpm

## 2025-02-12 VITALS
SYSTOLIC BLOOD PRESSURE: 101 MMHG | RESPIRATION RATE: 17 BRPM | WEIGHT: 293 LBS | HEART RATE: 84 BPM | HEIGHT: 68 IN | DIASTOLIC BLOOD PRESSURE: 65 MMHG | BODY MASS INDEX: 44.41 KG/M2 | OXYGEN SATURATION: 97 % | TEMPERATURE: 98.2 F

## 2025-02-12 LAB
ANION GAP SERPL CALC-SCNC: 11 MMOL/L (ref 7–16)
BASOPHILS # BLD: 0.02 K/UL (ref 0–0.2)
BASOPHILS NFR BLD: 0.2 % (ref 0–2)
BUN SERPL-MCNC: 9 MG/DL (ref 8–23)
CALCIUM SERPL-MCNC: 8.6 MG/DL (ref 8.8–10.2)
CHLORIDE SERPL-SCNC: 103 MMOL/L (ref 98–107)
CO2 SERPL-SCNC: 22 MMOL/L (ref 20–29)
CREAT SERPL-MCNC: 0.78 MG/DL (ref 0.6–1.1)
DIFFERENTIAL METHOD BLD: ABNORMAL
EOSINOPHIL # BLD: 0.02 K/UL (ref 0–0.8)
EOSINOPHIL NFR BLD: 0.2 % (ref 0.5–7.8)
ERYTHROCYTE [DISTWIDTH] IN BLOOD BY AUTOMATED COUNT: 13.7 % (ref 11.9–14.6)
GLUCOSE SERPL-MCNC: 96 MG/DL (ref 70–99)
HCT VFR BLD AUTO: 32.4 % (ref 35.8–46.3)
HGB BLD-MCNC: 10.4 G/DL (ref 11.7–15.4)
IMM GRANULOCYTES # BLD AUTO: 0.03 K/UL (ref 0–0.5)
IMM GRANULOCYTES NFR BLD AUTO: 0.3 % (ref 0–5)
LYMPHOCYTES # BLD: 5.83 K/UL (ref 0.5–4.6)
LYMPHOCYTES NFR BLD: 58.9 % (ref 13–44)
MCH RBC QN AUTO: 32.4 PG (ref 26.1–32.9)
MCHC RBC AUTO-ENTMCNC: 32.1 G/DL (ref 31.4–35)
MCV RBC AUTO: 100.9 FL (ref 82–102)
MONOCYTES # BLD: 0.67 K/UL (ref 0.1–1.3)
MONOCYTES NFR BLD: 6.8 % (ref 4–12)
NEUTS SEG # BLD: 3.33 K/UL (ref 1.7–8.2)
NEUTS SEG NFR BLD: 33.6 % (ref 43–78)
NRBC # BLD: 0 K/UL (ref 0–0.2)
PLATELET # BLD AUTO: 296 K/UL (ref 150–450)
PLATELET COMMENT: ADEQUATE
PMV BLD AUTO: 9.5 FL (ref 9.4–12.3)
POTASSIUM SERPL-SCNC: 4 MMOL/L (ref 3.5–5.1)
RBC # BLD AUTO: 3.21 M/UL (ref 4.05–5.2)
SODIUM SERPL-SCNC: 136 MMOL/L (ref 136–145)
WBC # BLD AUTO: 9.9 K/UL (ref 4.3–11.1)
WBC MORPH BLD: SLIGHT

## 2025-02-12 PROCEDURE — 36415 COLL VENOUS BLD VENIPUNCTURE: CPT

## 2025-02-12 PROCEDURE — 6370000000 HC RX 637 (ALT 250 FOR IP): Performed by: PHYSICIAN ASSISTANT

## 2025-02-12 PROCEDURE — 80048 BASIC METABOLIC PNL TOTAL CA: CPT

## 2025-02-12 PROCEDURE — 6360000002 HC RX W HCPCS: Performed by: PHYSICIAN ASSISTANT

## 2025-02-12 PROCEDURE — 97530 THERAPEUTIC ACTIVITIES: CPT

## 2025-02-12 PROCEDURE — APPSS30 APP SPLIT SHARED TIME 16-30 MINUTES: Performed by: PHYSICIAN ASSISTANT

## 2025-02-12 PROCEDURE — 85025 COMPLETE CBC W/AUTO DIFF WBC: CPT

## 2025-02-12 RX ORDER — PANTOPRAZOLE SODIUM 40 MG/1
40 TABLET, DELAYED RELEASE ORAL
Status: DISCONTINUED | OUTPATIENT
Start: 2025-02-12 | End: 2025-02-12 | Stop reason: HOSPADM

## 2025-02-12 RX ADMIN — GABAPENTIN 300 MG: 300 CAPSULE ORAL at 08:45

## 2025-02-12 RX ADMIN — SUCRALFATE 1 G: 1 TABLET ORAL at 05:30

## 2025-02-12 RX ADMIN — HEPARIN SODIUM 5000 UNITS: 5000 INJECTION INTRAVENOUS; SUBCUTANEOUS at 05:30

## 2025-02-12 RX ADMIN — OXYCODONE 5 MG: 5 TABLET ORAL at 05:38

## 2025-02-12 RX ADMIN — OXYCODONE 5 MG: 5 TABLET ORAL at 10:50

## 2025-02-12 RX ADMIN — ACETAMINOPHEN 650 MG: 325 TABLET, FILM COATED ORAL at 05:29

## 2025-02-12 RX ADMIN — SUCRALFATE 1 G: 1 TABLET ORAL at 12:25

## 2025-02-12 RX ADMIN — ACETAMINOPHEN 650 MG: 325 TABLET, FILM COATED ORAL at 12:25

## 2025-02-12 ASSESSMENT — PAIN - FUNCTIONAL ASSESSMENT: PAIN_FUNCTIONAL_ASSESSMENT: ACTIVITIES ARE NOT PREVENTED

## 2025-02-12 ASSESSMENT — PAIN DESCRIPTION - LOCATION
LOCATION: ABDOMEN
LOCATION: ABDOMEN

## 2025-02-12 ASSESSMENT — PAIN DESCRIPTION - DESCRIPTORS
DESCRIPTORS: ACHING
DESCRIPTORS: DISCOMFORT

## 2025-02-12 ASSESSMENT — PAIN DESCRIPTION - ORIENTATION
ORIENTATION: RIGHT
ORIENTATION: MID

## 2025-02-12 ASSESSMENT — PAIN SCALES - GENERAL
PAINLEVEL_OUTOF10: 6
PAINLEVEL_OUTOF10: 7
PAINLEVEL_OUTOF10: 3

## 2025-02-12 ASSESSMENT — PAIN DESCRIPTION - FREQUENCY: FREQUENCY: CONTINUOUS

## 2025-02-12 ASSESSMENT — PAIN DESCRIPTION - ONSET: ONSET: ON-GOING

## 2025-02-12 ASSESSMENT — PAIN DESCRIPTION - PAIN TYPE: TYPE: SURGICAL PAIN

## 2025-02-12 NOTE — PROGRESS NOTES
ACUTE PHYSICAL THERAPY GOALS:   (Developed with and agreed upon by patient and/or caregiver.)    LTG:  (1.)Ms. Garduno will move from supine to sit and sit to supine  in bed with MINIMAL ASSIST within 7 treatment day(s).  met  (2.)Ms. Garduno will transfer from bed to chair and chair to bed with CONTACT GUARD ASSIST using the least restrictive device within 7 treatment day(s).  met  (3.)Ms. Garduno will ambulate with CONTACT GUARD ASSIST for 50 feet with the least restrictive device within 7 treatment day(s).  (4)Ms. Garduno will perform HEP with cues to increase functional mobility in 7 days.  ________________________________________________________________________________________________      PHYSICAL THERAPY Daily Note and AM  (Link to Caseload Tracking: PT Visit Days : 2  Acknowledge Orders  Time In/Out  PT Charge Capture  Rehab Caseload Tracker    Bindu Garduno is a 64 y.o. female   PRIMARY DIAGNOSIS: Morbid obesity  Morbid obesity [E66.01]  Primary hypertension [I10]  Hyperlipemia [E78.5]  Procedure(s) (LRB):  ERAS/ GASTRIC BYPASS MATEO-EN-Y LAPAROSCOPIC ROBOTIC/ OMENTUM BIOPSY (N/A)  ESOPHAGOGASTRODUODENOSCOPY (N/A)  1 Day Post-Op  Reason for Referral: Generalized Muscle Weakness (M62.81)  Other abnormalities of gait and mobility (R26.89)  Inpatient: Payor: [employer] MEDICARE / Plan: [employer] CHOICE-PPO MEDICARE / Product Type: *No Product type* /     ASSESSMENT:     REHAB RECOMMENDATIONS:   Recommendation to date pending progress:  Setting:  Home Health Therapy    Equipment:     Has  and BSC     ASSESSMENT:  Ms. Garduno presents with decreased functional mobility s/p gastric bypass.  She lives with Yury and normally uses a walker for short distance gait only due to her knees, some assist with adl's.    This afternoon, she agreed to get up.  She refused sitting in chair.  She needed assistance with all functional mobility most significantly bed mobility. She is having some pain with mobility. She took some steps in the  decreased   Balance [] Sitting - Static: Good  Sitting - Dynamic: Fair  Standing - Static: Fair  Standing - Dynamic: Fair   Posture [] Rounded Shoulders  Trunk Flexion   Sensation [x]     Coordination []      Tone []     Edema []    Activity Tolerance [] Patient Tolerated treatment well, Patient limited by fatigue, Patient limited by pain    []      COGNITION/  PERCEPTION: Intact Impaired (Comments):   Orientation [x]     Vision [x]     Hearing [x]     Cognition  [x]       MOBILITY: I Mod I S SBA CGA Min Mod Max Total  NT x2 Comments:   Bed Mobility    Rolling [] [] [] [] [] [] [] [] [] [] []    Supine to Sit [] [] [] [x] [] [] [] [] [] [] []    Scooting [] [] [] [x] [] [] [] [] [] [] []    Sit to Supine [] [] [] [x] [] [] [] [] [] [] []    Transfers    Sit to Stand [] [] [] [x] [] [] [] [] [] [] []    Bed to Chair [] [] [] [] [] [] [] [] [] [] []    Stand to Sit [] [] [] [x] [] [] [] [] [] [] []     [] [] [] [] [] [] [] [] [] [] []    I=Independent, Mod I=Modified Independent, S=Supervision, SBA=Standby Assistance, CGA=Contact Guard Assistance,   Min=Minimal Assistance, Mod=Moderate Assistance, Max=Maximal Assistance, Total=Total Assistance, NT=Not Tested    GAIT: I Mod I S SBA CGA Min Mod Max Total  NT x2 Comments:   Level of Assistance [] [] [] [x] [] [] [] [] [] [] []    Distance 45 feet    DME Rolling Walker    Gait Quality Decreased sahil , Decreased step clearance, Decreased step length, and Trunk flexion    Weightbearing Status      Stairs  No steps at home    I=Independent, Mod I=Modified Independent, S=Supervision, SBA=Standby Assistance, CGA=Contact Guard Assistance,   Min=Minimal Assistance, Mod=Moderate Assistance, Max=Maximal Assistance, Total=Total Assistance, NT=Not Tested    PLAN:   FREQUENCY AND DURATION: Daily for duration of hospital stay or until stated goals are met, whichever comes first.    THERAPY PROGNOSIS: Good    PROBLEM LIST:   (Skilled intervention is medically necessary to

## 2025-02-12 NOTE — PROGRESS NOTES
Lydia Smith PA-C  [facility]    Bariatric Surgery Daily Progress Note    Patient: Bindu Garduno  MRN: [id number]  Date: 2/12/2025 8:09 AM  Admit Date: 2/11/2025  Procedure: robotic assisted gastric bypass, omental biopsy    Subjective:     Bindu Garduno is a 64 y.o. female who is POD #1 s/p gastric bypass completed by Dr. Lizbeth Zurita. She reports that she is doing well this morning and feeling good. She admits to moderate right sided abdominal pain, and denies nausea or vomiting. She has been OOB ambulating the halls, voiding without difficulty, using the incentive spirometer (2000+) and tolerating adequate PO intake but has not yet tried a protein shake.     Review of Systems   General ROS: negative for - fever or chills  Psychological ROS: negative for - memory difficulties  Ophthalmic ROS: negative for - blurry vision or double vision  Respiratory ROS: negative for - cough, shortness of breath, or wheezing  Cardiovascular ROS: negative for - chest pain  Gastrointestinal ROS: negative for - nausea, vomiting or dry heaving  Musculoskeletal ROS: negative for - pain in the lower extremities  Neurological ROS: negative for - dizziness, light headedness, headaches or numbness     Objective:     MEDS:    Current Facility-Administered Medications   Medication Dose Route Frequency Provider Last Rate Last Admin    pantoprazole (PROTONIX) tablet 40 mg  40 mg Oral QAM AC Lizbeth Almonte MD        diphenhydrAMINE (BENADRYL) capsule 25 mg  25 mg Oral Q6H PRN Lydia Smith PA        Or    diphenhydrAMINE (BENADRYL) injection 25 mg  25 mg IntraVENous Q6H PRN Lydia Smith PA        sodium chloride flush 0.9 % injection 5-40 mL  5-40 mL IntraVENous 2 times per day Lydia Smith PA   10 mL at 02/11/25 2204    sodium chloride flush 0.9 % injection 5-40 mL  5-40 mL IntraVENous PRN Lydia Smith PA        0.9 % sodium chloride  hour   Intake 1500 ml   Output 30 ml   Net 1470 ml       Physical Examination:     /65   Pulse 84   Temp 98.2 °F (36.8 °C) (Oral)   Resp 17   Ht 1.727 m (5' 7.99\")   Wt (!) 145.2 kg (320 lb 3.2 oz)   SpO2 97%   BMI 48.70 kg/m²     General:  Alert, oriented, cooperative in no acute distress.   Neuro:  Alert, oriented to person, place and time.  Lungs:  Unlabored breathing. Symmetrical chest expansion.   Heart: Regular rate and rhythm.  Abdomen:  Soft, appropriately tender at incision sites. Lap incisions C/D/I without presence of erythema, infection, or allergic reaction. Abdominal binder in place.  Extremities:  Extremities normal, atraumatic, no cyanosis or edema.    Labs / Imaging / Diagnostics:     Labs: All recent labs were reviewed.   Recent Results (from the past 24 hour(s))   POCT Glucose    Collection Time: 02/11/25  9:42 AM   Result Value Ref Range    POC Glucose 151 (H) 65 - 100 mg/dL    Performed by: Luiz    Basic Metabolic Panel    Collection Time: 02/12/25  4:20 AM   Result Value Ref Range    Sodium 136 136 - 145 mmol/L    Potassium 4.0 3.5 - 5.1 mmol/L    Chloride 103 98 - 107 mmol/L    CO2 22 20 - 29 mmol/L    Anion Gap 11 7 - 16 mmol/L    Glucose 96 70 - 99 mg/dL    BUN 9 8 - 23 MG/DL    Creatinine 0.78 0.60 - 1.10 MG/DL    Est, Glom Filt Rate 85 >60 ml/min/1.73m2    Calcium 8.6 (L) 8.8 - 10.2 MG/DL   CBC with Auto Differential    Collection Time: 02/12/25  4:20 AM   Result Value Ref Range    WBC 9.9 4.3 - 11.1 K/uL    RBC 3.21 (L) 4.05 - 5.2 M/uL    Hemoglobin 10.4 (L) 11.7 - 15.4 g/dL    Hematocrit 32.4 (L) 35.8 - 46.3 %    .9 82.0 - 102.0 FL    MCH 32.4 26.1 - 32.9 PG    MCHC 32.1 31.4 - 35.0 g/dL    RDW 13.7 11.9 - 14.6 %    Platelets 296 150 - 450 K/uL    MPV 9.5 9.4 - 12.3 FL    nRBC 0.00 0.0 - 0.2 K/uL    Neutrophils % 33.6 (L) 43.0 - 78.0 %    Lymphocytes % 58.9 (H) 13.0 - 44.0 %    Monocytes % 6.8 4.0 - 12.0 %    Eosinophils % 0.2 (L) 0.5 - 7.8 %    Basophils  % 0.2 0.0 - 2.0 %    Immature Granulocytes % 0.3 0.0 - 5.0 %    Neutrophils Absolute 3.33 1.70 - 8.20 K/UL    Lymphocytes Absolute 5.83 (H) 0.50 - 4.60 K/UL    Monocytes Absolute 0.67 0.10 - 1.30 K/UL    Eosinophils Absolute 0.02 0.00 - 0.80 K/UL    Basophils Absolute 0.02 0.00 - 0.20 K/UL    Immature Granulocytes Absolute 0.03 0.0 - 0.5 K/UL    WBC Comment SLIGHT      Platelet Comment ADEQUATE      Differential Type MANUAL         Imaging: No images were independently reviewed.    Assessment / Plan:     Active Hospital Problems    Diagnosis Date Noted    Morbid obesity [E66.01] 01/22/2025    Primary hypertension [I10] 01/22/2025    Hyperlipemia [E78.5] 01/22/2025       Bindu Garduno is a 64 y.o. female s/p robotic assisted gastric bypass    Pain control   2.   Bariatric diet - clear liquids, Ensure Max protein  3.   Lap incisions C/D/I  4.   No tachycardia overnight   5.   Labs reviewed. WBC 9.9. Hgb stable 10.4 from 11.9.  6.   OOB and ambulate as tolerated. PT consulted.   7.   DVT prophylaxis - SCDs/Heparin  8.   Abdominal binder for comfort  9.   Recheck midday for protein shake tolerance and continued PO intake    Discharge: to home later today or tomorrow    Lydia Smith PA-C  2/12/2025    Counseling time:counseling time more than 50% of visit: 20 minutes: I spent this time preparing to see patient (including chart review and preparation), obtaining and/or reviewing additional medical history, performing a physical exam and evaluation, documenting clinical information in the electronic health record, independently interpreting results, communicating results to patient, family or caregiver, and/or coordinating care.

## 2025-02-12 NOTE — DISCHARGE INSTRUCTIONS
Bariatric Surgery Discharge Instructions    Surgeon: Dr. Lizbeth Zurita    Follow up:  Follow up with your surgeon or physician assistant as previously scheduled in 1-2 weeks.  [facility]  Office number: [phone]    Diet:  When discharged from the hospital, you may begin clear and full liquid diet plus protein supplements  Start with clear liquids and progress to full liquids as tolerated  Goals: 60 grams of protein per day, 64 ounces of fluid per day  Avoid carbonated beverages and straws, avoid excessive air swallowing when drinking/eating, minimize caffeine intake. No alcohol.    Wound care:  Surgical glue will flake off in 7-10 days; the edges of steri-strips may come up but leave them in place until your follow up appointment  May shower following surgery. It is okay to get soap and water on all wounds when showering. Do not soak wounds under water (bath, pool, hot tub) until healed about three weeks after surgery.     Activity:  No lifting more than 20 lbs for 3-4 weeks. No repetitive abdominal straining (sit-ups, push-ups, crunches, pull-ups, squats), for 3 weeks. Okay to perform normal activities of daily living and walk up stairs. Walk daily. Continue deep breathing and use incentive spirometer at home.   Return to school or work when you fee comfortable. Typically 1-2 weeks for a desk job or up to 4 weeks for a manual labor job.   You may resume driving when you have minimal pain and are not taking narcotic pain medication.     Medications:  You will have been prescribed the following medications prior to discharge:  Percocet (5mg): take one pill by mouth every 4 hours as needed for pain  Zofran (8mg): take one pill by mouth every 8 hours for nausea or vomiting  Omeprazole (40mg): take one pill by mouth daily for 90 days  Use an over the counter stool softener (Miralax) or laxative (Ducolax, milk of magnesium) if you feel constipated. You may not have a  day. This may keep you from feeling too full after eating and may reduce problems with diarrhea and dumping syndrome.     Check with your doctor before drinking alcohol. Your body may absorb alcohol more quickly after surgery.   Medicines    Your doctor will tell you if and when you can restart your medicines. You will also be given instructions about taking any new medicines.     If you stopped taking aspirin or some other blood thinner, your doctor will tell you when to start taking it again.     Be safe with medicines. Take pain medicines exactly as directed.  If the doctor gave you a prescription medicine for pain, take it as prescribed.  If you are not taking a prescription pain medicine, ask your doctor if you can take an over-the-counter medicine.  Do not take two or more pain medicines at the same time unless the doctor told you to. Many pain medicines contain acetaminophen (Tylenol). Too much acetaminophen can be harmful.     If you think your pain medicine is making you sick to your stomach:  Take your medicine after meals (unless your doctor has told you not to).  Ask your doctor for a different pain medicine.     If your doctor prescribed antibiotics, take them as directed. Do not stop taking them just because you feel better. You need to take the full course of antibiotics.   Incision care    If you have strips of tape on the incision(s), leave the tape on for a week or until it falls off.     Wash the area daily with warm, soapy water, and pat it dry. Don't use hydrogen peroxide or alcohol, which can slow healing. You may cover the area with a gauze bandage if it weeps or rubs against clothing. Change the bandage every day.     Keep the area clean and dry.   Follow-up care is a key part of your treatment and safety. Be sure to make and go to all appointments, and call your doctor if you are having problems. It's also a good idea to know your test results and keep a list of the medicines you take.  When

## 2025-02-12 NOTE — PROGRESS NOTES
Discharge instructions reviewed and copy provided for pt. Opportunity provided for questions. Pt. verbalized understanding. IV was removed without difficulty. Pt taken to car via wc.

## 2025-02-18 ENCOUNTER — OFFICE VISIT (OUTPATIENT)
Dept: SURGERY | Age: 65
End: 2025-02-18

## 2025-02-18 VITALS
HEART RATE: 105 BPM | HEIGHT: 67 IN | SYSTOLIC BLOOD PRESSURE: 154 MMHG | WEIGHT: 293 LBS | DIASTOLIC BLOOD PRESSURE: 91 MMHG | BODY MASS INDEX: 45.99 KG/M2

## 2025-02-18 DIAGNOSIS — E66.01 MORBID OBESITY: Primary | ICD-10-CM

## 2025-02-18 DIAGNOSIS — E66.01 OBESITY, CLASS III, BMI 40-49.9 (MORBID OBESITY): ICD-10-CM

## 2025-02-18 DIAGNOSIS — Z71.3 NUTRITIONAL COUNSELING: ICD-10-CM

## 2025-02-18 DIAGNOSIS — Z98.84 S/P GASTRIC BYPASS: ICD-10-CM

## 2025-02-18 DIAGNOSIS — K59.1 FUNCTIONAL DIARRHEA: ICD-10-CM

## 2025-02-18 DIAGNOSIS — Z71.82 EXERCISE COUNSELING: ICD-10-CM

## 2025-02-18 PROCEDURE — 99024 POSTOP FOLLOW-UP VISIT: CPT | Performed by: PHYSICIAN ASSISTANT

## 2025-02-18 NOTE — PROGRESS NOTES
Lydia Smith PA-C  [facility]      Date of visit: 2025          Name: Bindu Garduno      MRN: [id number]       : 1960       Sex: female  PCP: Allison Loja MD     Surgeon: Dr. Lizbeth Zurita  Procedure: robotic assisted gastric bypass    Surgeon: Daniel   DOS: 2025   Procedure: Bypass   Pre-op weight: 324   Ideal body weight: 150   Excess body weight: 174     Weight History Graph  Last Surgical Weight Loss:      2025     5:00 AM 2025     1:23 PM   Surgical Weight Loss Tracker   Consult Date 2024    Initial Height 5' 5\"    Initial Weight 323 lb    Initial BMI 53.74    Ideal Body Weight 150 lb    Surgery Date 2025    Pre-Surgical Height 5' 5\"    Pre-Surgical Weight 324 lb    Pre Surgery BMI 53.91    Weight to Lose 174 lb    Date  2025   Height  5' 7\"   Weight  313 lb   BMI  49.02   Weight Change  -11 lb   Total Weight Change  -11 lb   % EBWL  6%      Subjective   1 week post-op visit after a robotic assisted gastric bypass was done on 2025.   She has lost 11 lbs since her last office visit.    Clinical Assessment:    She is doing very well today and is feeling good.  She reports that she has been adhering to the liquid diet without difficulty.  She denies any abdominal pain, nausea or vomiting or heartburn.  She denies fevers or chills, or any redness or drainage from the incision sites.  She does not report having problems with constipation but has experienced diarrhea.  Her pain is well controlled and is not taking pain medications.  She has been taking the PPI without difficulty.  She has been doing her protein shakes without difficulty.     Protein:  40 grams per day  Fluids:    40 ounces per day  Exercise:  none    Evaluation of Pre-operative Co-morbid Conditions:    Sleep Apnea Yes, uses CPAP- Yes   Hypertension Yes, number of medications- 1   GERD Yes, treatment medication- Omeprazole

## 2025-02-18 NOTE — PROGRESS NOTES
[facility] NUTRITION REASSESSMENT    Assessment:  Pt is 1 weeks s/p RYGB.    Pt is doing well with full liquid diet compliance.    Intake of ~40g protein/d and ~40oz fluid/d.   Pt is eating protein shakes; and drinking water and protein shakes.    Pt to begin taking MVI and Ca supplements as recommended - pt is ordering vitamins.  Pt reports no food related issues.     Intervention:   Discussed food choices and meal ideas on pureed/smooth diet, once released by surgeon.   Encouraged pt to follow mindful eating behaviors, lean protein focus followed by non-starchy vegetables as able.  Encouraged pt use protein supplements throughout the day as snacks rather than as meal replacement.    Encouraged continued and increased activity.      Monitoring and Evaluation:  Monitor for continued safe, supervised weight loss for RYGB.     Monitor pt for meeting protein requirements of 60-80g/d, 64+ fl oz fluids.  Follow for tolerance of pureed/smooth diet.   F/U per MD Karen Hernández MBA. RD, LD

## 2025-02-21 ENCOUNTER — TELEPHONE (OUTPATIENT)
Dept: SURGERY | Age: 65
End: 2025-02-21

## 2025-02-21 DIAGNOSIS — Z71.3 NUTRITIONAL COUNSELING: Primary | ICD-10-CM

## 2025-02-21 NOTE — TELEPHONE ENCOUNTER
Pt had surgery on 02/11/25 and was just seen in the office on 02/18/25 where pt was advanced to a pureed/smooth diet. Pt called the office today stating that she ate grapes last night and has been experiencing stomach pain since. I spoke with the PA and she encouraged pt to dial back her diet and remain on just liquids for 24-48 hours. Encouraged pt to go to ER if pain persists or worsens. Also stressed the importance of following diet compliance. Pt verbalized understanding.

## 2025-02-22 ENCOUNTER — HOSPITAL ENCOUNTER (INPATIENT)
Age: 65
LOS: 3 days | Discharge: HOME HEALTH | DRG: 394 | End: 2025-02-25
Attending: FAMILY MEDICINE | Admitting: FAMILY MEDICINE
Payer: MEDICARE

## 2025-02-22 ENCOUNTER — APPOINTMENT (OUTPATIENT)
Dept: GENERAL RADIOLOGY | Age: 65
DRG: 394 | End: 2025-02-22
Attending: FAMILY MEDICINE
Payer: MEDICARE

## 2025-02-22 DIAGNOSIS — R89.7 ABNORMAL BIOPSY RESULT: Primary | ICD-10-CM

## 2025-02-22 PROBLEM — E83.42 HYPOMAGNESEMIA: Status: ACTIVE | Noted: 2025-02-22

## 2025-02-22 PROBLEM — E11.9 TYPE 2 DIABETES MELLITUS WITHOUT COMPLICATION, WITHOUT LONG-TERM CURRENT USE OF INSULIN (HCC): Status: ACTIVE | Noted: 2025-02-22

## 2025-02-22 PROBLEM — K56.609 SMALL BOWEL OBSTRUCTION (HCC): Status: ACTIVE | Noted: 2025-02-22

## 2025-02-22 PROBLEM — I10 PRIMARY HYPERTENSION: Status: RESOLVED | Noted: 2025-01-22 | Resolved: 2025-02-22

## 2025-02-22 PROBLEM — K56.609 SBO (SMALL BOWEL OBSTRUCTION) (HCC): Status: ACTIVE | Noted: 2025-02-22

## 2025-02-22 PROBLEM — E87.6 HYPOKALEMIA: Status: ACTIVE | Noted: 2025-02-22

## 2025-02-22 LAB
ALBUMIN SERPL-MCNC: 3.8 G/DL (ref 3.2–4.6)
ALBUMIN/GLOB SERPL: 1.1 (ref 1–1.9)
ALP SERPL-CCNC: 58 U/L (ref 35–104)
ALT SERPL-CCNC: 15 U/L (ref 8–45)
ANION GAP SERPL CALC-SCNC: 12 MMOL/L (ref 7–16)
AST SERPL-CCNC: 14 U/L (ref 15–37)
BASOPHILS # BLD: 0.03 K/UL (ref 0–0.2)
BASOPHILS NFR BLD: 0.3 % (ref 0–2)
BILIRUB SERPL-MCNC: 0.4 MG/DL (ref 0–1.2)
BUN SERPL-MCNC: 6 MG/DL (ref 8–23)
CALCIUM SERPL-MCNC: 9.9 MG/DL (ref 8.8–10.2)
CHLORIDE SERPL-SCNC: 108 MMOL/L (ref 98–107)
CO2 SERPL-SCNC: 23 MMOL/L (ref 20–29)
CREAT SERPL-MCNC: 0.72 MG/DL (ref 0.6–1.1)
CRP SERPL-MCNC: <0.3 MG/DL (ref 0–0.4)
DIFFERENTIAL METHOD BLD: ABNORMAL
EOSINOPHIL # BLD: 0.05 K/UL (ref 0–0.8)
EOSINOPHIL NFR BLD: 0.5 % (ref 0.5–7.8)
ERYTHROCYTE [DISTWIDTH] IN BLOOD BY AUTOMATED COUNT: 14.1 % (ref 11.9–14.6)
EST. AVERAGE GLUCOSE BLD GHB EST-MCNC: 94 MG/DL
GLOBULIN SER CALC-MCNC: 3.4 G/DL (ref 2.3–3.5)
GLUCOSE BLD STRIP.AUTO-MCNC: 101 MG/DL (ref 65–100)
GLUCOSE BLD STRIP.AUTO-MCNC: 102 MG/DL (ref 65–100)
GLUCOSE BLD STRIP.AUTO-MCNC: 113 MG/DL (ref 65–100)
GLUCOSE SERPL-MCNC: 110 MG/DL (ref 70–99)
HBA1C MFR BLD: 4.9 % (ref 0–5.6)
HCT VFR BLD AUTO: 36.2 % (ref 35.8–46.3)
HGB BLD-MCNC: 11.6 G/DL (ref 11.7–15.4)
IMM GRANULOCYTES # BLD AUTO: 0.06 K/UL (ref 0–0.5)
IMM GRANULOCYTES NFR BLD AUTO: 0.6 % (ref 0–5)
LYMPHOCYTES # BLD: 4.06 K/UL (ref 0.5–4.6)
LYMPHOCYTES NFR BLD: 42.4 % (ref 13–44)
MAGNESIUM SERPL-MCNC: 1.7 MG/DL (ref 1.8–2.4)
MCH RBC QN AUTO: 31.9 PG (ref 26.1–32.9)
MCHC RBC AUTO-ENTMCNC: 32 G/DL (ref 31.4–35)
MCV RBC AUTO: 99.5 FL (ref 82–102)
MONOCYTES # BLD: 0.58 K/UL (ref 0.1–1.3)
MONOCYTES NFR BLD: 6.1 % (ref 4–12)
NEUTS SEG # BLD: 4.79 K/UL (ref 1.7–8.2)
NEUTS SEG NFR BLD: 50.1 % (ref 43–78)
NRBC # BLD: 0 K/UL (ref 0–0.2)
PLATELET # BLD AUTO: 375 K/UL (ref 150–450)
PMV BLD AUTO: 9.2 FL (ref 9.4–12.3)
POTASSIUM SERPL-SCNC: 3.2 MMOL/L (ref 3.5–5.1)
PROCALCITONIN SERPL-MCNC: 0.04 NG/ML (ref 0–0.1)
PROT SERPL-MCNC: 7.1 G/DL (ref 6.3–8.2)
RBC # BLD AUTO: 3.64 M/UL (ref 4.05–5.2)
SERVICE CMNT-IMP: ABNORMAL
SODIUM SERPL-SCNC: 143 MMOL/L (ref 136–145)
WBC # BLD AUTO: 9.6 K/UL (ref 4.3–11.1)

## 2025-02-22 PROCEDURE — 83036 HEMOGLOBIN GLYCOSYLATED A1C: CPT

## 2025-02-22 PROCEDURE — 2580000003 HC RX 258: Performed by: FAMILY MEDICINE

## 2025-02-22 PROCEDURE — 86140 C-REACTIVE PROTEIN: CPT

## 2025-02-22 PROCEDURE — 80053 COMPREHEN METABOLIC PANEL: CPT

## 2025-02-22 PROCEDURE — 99222 1ST HOSP IP/OBS MODERATE 55: CPT | Performed by: SURGERY

## 2025-02-22 PROCEDURE — 6370000000 HC RX 637 (ALT 250 FOR IP): Performed by: FAMILY MEDICINE

## 2025-02-22 PROCEDURE — 1100000000 HC RM PRIVATE

## 2025-02-22 PROCEDURE — 84145 PROCALCITONIN (PCT): CPT

## 2025-02-22 PROCEDURE — 82962 GLUCOSE BLOOD TEST: CPT

## 2025-02-22 PROCEDURE — 74019 RADEX ABDOMEN 2 VIEWS: CPT

## 2025-02-22 PROCEDURE — 6370000000 HC RX 637 (ALT 250 FOR IP): Performed by: NURSE PRACTITIONER

## 2025-02-22 PROCEDURE — 85025 COMPLETE CBC W/AUTO DIFF WBC: CPT

## 2025-02-22 PROCEDURE — 36415 COLL VENOUS BLD VENIPUNCTURE: CPT

## 2025-02-22 PROCEDURE — 2580000003 HC RX 258: Performed by: NURSE PRACTITIONER

## 2025-02-22 PROCEDURE — 2500000003 HC RX 250 WO HCPCS: Performed by: FAMILY MEDICINE

## 2025-02-22 PROCEDURE — 6360000002 HC RX W HCPCS: Performed by: NURSE PRACTITIONER

## 2025-02-22 PROCEDURE — 6360000002 HC RX W HCPCS: Performed by: FAMILY MEDICINE

## 2025-02-22 PROCEDURE — 6360000002 HC RX W HCPCS: Performed by: SURGERY

## 2025-02-22 PROCEDURE — 83735 ASSAY OF MAGNESIUM: CPT

## 2025-02-22 RX ORDER — KETOROLAC TROMETHAMINE 15 MG/ML
15 INJECTION, SOLUTION INTRAMUSCULAR; INTRAVENOUS EVERY 6 HOURS PRN
Status: DISCONTINUED | OUTPATIENT
Start: 2025-02-22 | End: 2025-02-25

## 2025-02-22 RX ORDER — HYDRALAZINE HYDROCHLORIDE 20 MG/ML
10 INJECTION INTRAMUSCULAR; INTRAVENOUS EVERY 8 HOURS PRN
Status: DISCONTINUED | OUTPATIENT
Start: 2025-02-22 | End: 2025-02-25 | Stop reason: HOSPADM

## 2025-02-22 RX ORDER — OMEGA-3-ACID ETHYL ESTERS 1 G/1
1 CAPSULE, LIQUID FILLED ORAL 2 TIMES DAILY
Status: DISCONTINUED | OUTPATIENT
Start: 2025-02-22 | End: 2025-02-25 | Stop reason: HOSPADM

## 2025-02-22 RX ORDER — ACETAMINOPHEN 500 MG
500 TABLET ORAL EVERY 6 HOURS PRN
Status: DISCONTINUED | OUTPATIENT
Start: 2025-02-22 | End: 2025-02-22 | Stop reason: SDUPTHER

## 2025-02-22 RX ORDER — POTASSIUM CHLORIDE 1500 MG/1
40 TABLET, EXTENDED RELEASE ORAL DAILY
Status: COMPLETED | OUTPATIENT
Start: 2025-02-22 | End: 2025-02-24

## 2025-02-22 RX ORDER — INSULIN LISPRO 100 [IU]/ML
0-4 INJECTION, SOLUTION INTRAVENOUS; SUBCUTANEOUS
Status: DISCONTINUED | OUTPATIENT
Start: 2025-02-22 | End: 2025-02-25 | Stop reason: HOSPADM

## 2025-02-22 RX ORDER — IBUPROFEN 600 MG/1
1 TABLET ORAL PRN
Status: DISCONTINUED | OUTPATIENT
Start: 2025-02-22 | End: 2025-02-25 | Stop reason: HOSPADM

## 2025-02-22 RX ORDER — AMLODIPINE BESYLATE 10 MG/1
10 TABLET ORAL DAILY
Status: DISCONTINUED | OUTPATIENT
Start: 2025-02-22 | End: 2025-02-25 | Stop reason: HOSPADM

## 2025-02-22 RX ORDER — FERROUS SULFATE 325(65) MG
325 TABLET ORAL DAILY
Status: DISCONTINUED | OUTPATIENT
Start: 2025-02-22 | End: 2025-02-25 | Stop reason: HOSPADM

## 2025-02-22 RX ORDER — SERTRALINE HYDROCHLORIDE 100 MG/1
100 TABLET, FILM COATED ORAL DAILY
Status: DISCONTINUED | OUTPATIENT
Start: 2025-02-22 | End: 2025-02-25 | Stop reason: HOSPADM

## 2025-02-22 RX ORDER — FUROSEMIDE 20 MG/1
20 TABLET ORAL 2 TIMES DAILY
Status: DISCONTINUED | OUTPATIENT
Start: 2025-02-22 | End: 2025-02-25 | Stop reason: HOSPADM

## 2025-02-22 RX ORDER — MENTHOL/CAMPHOR/ALLANTOIN/PHE 0.6-0.5-1%
OINTMENT(EA) TOPICAL PRN
Status: DISCONTINUED | OUTPATIENT
Start: 2025-02-22 | End: 2025-02-25 | Stop reason: HOSPADM

## 2025-02-22 RX ORDER — ACETAMINOPHEN 325 MG/1
650 TABLET ORAL EVERY 6 HOURS PRN
Status: DISCONTINUED | OUTPATIENT
Start: 2025-02-22 | End: 2025-02-25 | Stop reason: HOSPADM

## 2025-02-22 RX ORDER — PANTOPRAZOLE SODIUM 40 MG/1
40 TABLET, DELAYED RELEASE ORAL
Status: DISCONTINUED | OUTPATIENT
Start: 2025-02-22 | End: 2025-02-25 | Stop reason: HOSPADM

## 2025-02-22 RX ORDER — FOLIC ACID 1 MG/1
1 TABLET ORAL DAILY
Status: DISCONTINUED | OUTPATIENT
Start: 2025-02-22 | End: 2025-02-25 | Stop reason: HOSPADM

## 2025-02-22 RX ORDER — SODIUM CHLORIDE AND POTASSIUM CHLORIDE 150; 900 MG/100ML; MG/100ML
INJECTION, SOLUTION INTRAVENOUS CONTINUOUS
Status: DISCONTINUED | OUTPATIENT
Start: 2025-02-22 | End: 2025-02-23

## 2025-02-22 RX ORDER — POTASSIUM CHLORIDE 750 MG/1
10 TABLET, EXTENDED RELEASE ORAL DAILY
Status: DISCONTINUED | OUTPATIENT
Start: 2025-02-22 | End: 2025-02-22

## 2025-02-22 RX ORDER — ONDANSETRON 4 MG/1
4 TABLET, ORALLY DISINTEGRATING ORAL EVERY 8 HOURS PRN
Status: DISCONTINUED | OUTPATIENT
Start: 2025-02-22 | End: 2025-02-25 | Stop reason: HOSPADM

## 2025-02-22 RX ORDER — SODIUM CHLORIDE 0.9 % (FLUSH) 0.9 %
5-40 SYRINGE (ML) INJECTION PRN
Status: DISCONTINUED | OUTPATIENT
Start: 2025-02-22 | End: 2025-02-25 | Stop reason: HOSPADM

## 2025-02-22 RX ORDER — POTASSIUM CHLORIDE 7.45 MG/ML
10 INJECTION INTRAVENOUS PRN
Status: DISCONTINUED | OUTPATIENT
Start: 2025-02-22 | End: 2025-02-25 | Stop reason: HOSPADM

## 2025-02-22 RX ORDER — PROCHLORPERAZINE EDISYLATE 5 MG/ML
10 INJECTION INTRAMUSCULAR; INTRAVENOUS EVERY 6 HOURS PRN
Status: DISCONTINUED | OUTPATIENT
Start: 2025-02-22 | End: 2025-02-25 | Stop reason: HOSPADM

## 2025-02-22 RX ORDER — SODIUM CHLORIDE 0.9 % (FLUSH) 0.9 %
5-40 SYRINGE (ML) INJECTION EVERY 12 HOURS SCHEDULED
Status: DISCONTINUED | OUTPATIENT
Start: 2025-02-22 | End: 2025-02-25 | Stop reason: HOSPADM

## 2025-02-22 RX ORDER — SODIUM CHLORIDE 9 MG/ML
INJECTION, SOLUTION INTRAVENOUS PRN
Status: DISCONTINUED | OUTPATIENT
Start: 2025-02-22 | End: 2025-02-25 | Stop reason: HOSPADM

## 2025-02-22 RX ORDER — MAGNESIUM SULFATE IN WATER 40 MG/ML
2000 INJECTION, SOLUTION INTRAVENOUS ONCE
Status: COMPLETED | OUTPATIENT
Start: 2025-02-22 | End: 2025-02-22

## 2025-02-22 RX ORDER — MORPHINE SULFATE 2 MG/ML
2 INJECTION, SOLUTION INTRAMUSCULAR; INTRAVENOUS
Status: DISCONTINUED | OUTPATIENT
Start: 2025-02-22 | End: 2025-02-25 | Stop reason: HOSPADM

## 2025-02-22 RX ORDER — ONDANSETRON 2 MG/ML
4 INJECTION INTRAMUSCULAR; INTRAVENOUS EVERY 6 HOURS PRN
Status: DISCONTINUED | OUTPATIENT
Start: 2025-02-22 | End: 2025-02-25 | Stop reason: HOSPADM

## 2025-02-22 RX ORDER — ACETAMINOPHEN 650 MG/1
650 SUPPOSITORY RECTAL EVERY 6 HOURS PRN
Status: DISCONTINUED | OUTPATIENT
Start: 2025-02-22 | End: 2025-02-25 | Stop reason: HOSPADM

## 2025-02-22 RX ORDER — ATORVASTATIN CALCIUM 10 MG/1
10 TABLET, FILM COATED ORAL DAILY
Status: DISCONTINUED | OUTPATIENT
Start: 2025-02-22 | End: 2025-02-25 | Stop reason: HOSPADM

## 2025-02-22 RX ORDER — DIPHENHYDRAMINE HCL 25 MG
25 CAPSULE ORAL ONCE
Status: COMPLETED | OUTPATIENT
Start: 2025-02-22 | End: 2025-02-22

## 2025-02-22 RX ORDER — SODIUM CHLORIDE 9 MG/ML
INJECTION, SOLUTION INTRAVENOUS CONTINUOUS
Status: DISCONTINUED | OUTPATIENT
Start: 2025-02-22 | End: 2025-02-22

## 2025-02-22 RX ORDER — BUPROPION HYDROCHLORIDE 75 MG/1
75 TABLET ORAL 2 TIMES DAILY
Status: DISCONTINUED | OUTPATIENT
Start: 2025-02-22 | End: 2025-02-25 | Stop reason: HOSPADM

## 2025-02-22 RX ORDER — POTASSIUM CHLORIDE 1500 MG/1
40 TABLET, EXTENDED RELEASE ORAL PRN
Status: DISCONTINUED | OUTPATIENT
Start: 2025-02-22 | End: 2025-02-25 | Stop reason: HOSPADM

## 2025-02-22 RX ORDER — ENOXAPARIN SODIUM 100 MG/ML
30 INJECTION SUBCUTANEOUS 2 TIMES DAILY
Status: DISCONTINUED | OUTPATIENT
Start: 2025-02-22 | End: 2025-02-25 | Stop reason: HOSPADM

## 2025-02-22 RX ORDER — POLYETHYLENE GLYCOL 3350 17 G/17G
17 POWDER, FOR SOLUTION ORAL DAILY PRN
Status: DISCONTINUED | OUTPATIENT
Start: 2025-02-22 | End: 2025-02-25 | Stop reason: HOSPADM

## 2025-02-22 RX ORDER — OXYCODONE HYDROCHLORIDE 5 MG/1
10 TABLET ORAL EVERY 6 HOURS PRN
Status: DISCONTINUED | OUTPATIENT
Start: 2025-02-22 | End: 2025-02-25 | Stop reason: HOSPADM

## 2025-02-22 RX ORDER — LEVOTHYROXINE SODIUM 50 UG/1
25 TABLET ORAL
Status: DISCONTINUED | OUTPATIENT
Start: 2025-02-22 | End: 2025-02-25 | Stop reason: HOSPADM

## 2025-02-22 RX ORDER — MAGNESIUM SULFATE IN WATER 40 MG/ML
2000 INJECTION, SOLUTION INTRAVENOUS PRN
Status: DISCONTINUED | OUTPATIENT
Start: 2025-02-22 | End: 2025-02-25 | Stop reason: HOSPADM

## 2025-02-22 RX ORDER — DEXTROSE MONOHYDRATE 100 MG/ML
INJECTION, SOLUTION INTRAVENOUS CONTINUOUS PRN
Status: DISCONTINUED | OUTPATIENT
Start: 2025-02-22 | End: 2025-02-25 | Stop reason: HOSPADM

## 2025-02-22 RX ADMIN — ATORVASTATIN CALCIUM 10 MG: 10 TABLET, FILM COATED ORAL at 21:31

## 2025-02-22 RX ADMIN — POTASSIUM CHLORIDE AND SODIUM CHLORIDE: 900; 150 INJECTION, SOLUTION INTRAVENOUS at 23:37

## 2025-02-22 RX ADMIN — OMEGA-3-ACID ETHYL ESTERS CAPSULES 1 G: 1 CAPSULE, LIQUID FILLED ORAL at 21:30

## 2025-02-22 RX ADMIN — Medication 7 G: at 08:34

## 2025-02-22 RX ADMIN — ENOXAPARIN SODIUM 30 MG: 100 INJECTION SUBCUTANEOUS at 07:36

## 2025-02-22 RX ADMIN — MORPHINE SULFATE 2 MG: 2 INJECTION, SOLUTION INTRAMUSCULAR; INTRAVENOUS at 01:44

## 2025-02-22 RX ADMIN — BUPROPION HYDROCHLORIDE 75 MG: 75 TABLET, FILM COATED ORAL at 07:38

## 2025-02-22 RX ADMIN — MORPHINE SULFATE 2 MG: 2 INJECTION, SOLUTION INTRAMUSCULAR; INTRAVENOUS at 14:33

## 2025-02-22 RX ADMIN — SERTRALINE 100 MG: 100 TABLET, FILM COATED ORAL at 07:39

## 2025-02-22 RX ADMIN — MORPHINE SULFATE 2 MG: 2 INJECTION, SOLUTION INTRAMUSCULAR; INTRAVENOUS at 21:30

## 2025-02-22 RX ADMIN — FUROSEMIDE 20 MG: 20 TABLET ORAL at 21:42

## 2025-02-22 RX ADMIN — ENOXAPARIN SODIUM 30 MG: 100 INJECTION SUBCUTANEOUS at 21:31

## 2025-02-22 RX ADMIN — BUPROPION HYDROCHLORIDE 75 MG: 75 TABLET, FILM COATED ORAL at 21:31

## 2025-02-22 RX ADMIN — OMEGA-3-ACID ETHYL ESTERS CAPSULES 1 G: 1 CAPSULE, LIQUID FILLED ORAL at 07:37

## 2025-02-22 RX ADMIN — SODIUM CHLORIDE: 9 INJECTION, SOLUTION INTRAVENOUS at 01:48

## 2025-02-22 RX ADMIN — ONDANSETRON 4 MG: 2 INJECTION, SOLUTION INTRAMUSCULAR; INTRAVENOUS at 02:18

## 2025-02-22 RX ADMIN — SODIUM CHLORIDE, PRESERVATIVE FREE 10 ML: 5 INJECTION INTRAVENOUS at 21:32

## 2025-02-22 RX ADMIN — ONDANSETRON 4 MG: 4 TABLET, ORALLY DISINTEGRATING ORAL at 08:26

## 2025-02-22 RX ADMIN — PIPERACILLIN AND TAZOBACTAM 4500 MG: 4; .5 INJECTION, POWDER, FOR SOLUTION INTRAVENOUS at 21:47

## 2025-02-22 RX ADMIN — FOLIC ACID 1 MG: 1 TABLET ORAL at 07:38

## 2025-02-22 RX ADMIN — POTASSIUM CHLORIDE AND SODIUM CHLORIDE: 900; 150 INJECTION, SOLUTION INTRAVENOUS at 11:33

## 2025-02-22 RX ADMIN — FUROSEMIDE 20 MG: 20 TABLET ORAL at 07:38

## 2025-02-22 RX ADMIN — ONDANSETRON 4 MG: 4 TABLET, ORALLY DISINTEGRATING ORAL at 21:50

## 2025-02-22 RX ADMIN — DIPHENHYDRAMINE HYDROCHLORIDE 25 MG: 25 CAPSULE ORAL at 02:17

## 2025-02-22 RX ADMIN — PIPERACILLIN AND TAZOBACTAM 4500 MG: 4; .5 INJECTION, POWDER, FOR SOLUTION INTRAVENOUS at 13:39

## 2025-02-22 RX ADMIN — MAGNESIUM SULFATE HEPTAHYDRATE 2000 MG: 40 INJECTION, SOLUTION INTRAVENOUS at 07:50

## 2025-02-22 RX ADMIN — FERROUS SULFATE TAB 325 MG (65 MG ELEMENTAL FE) 325 MG: 325 (65 FE) TAB at 07:39

## 2025-02-22 RX ADMIN — SODIUM CHLORIDE, PRESERVATIVE FREE 10 ML: 5 INJECTION INTRAVENOUS at 07:57

## 2025-02-22 RX ADMIN — AMLODIPINE BESYLATE 10 MG: 10 TABLET ORAL at 07:39

## 2025-02-22 RX ADMIN — MORPHINE SULFATE 2 MG: 2 INJECTION, SOLUTION INTRAMUSCULAR; INTRAVENOUS at 07:53

## 2025-02-22 RX ADMIN — PIPERACILLIN AND TAZOBACTAM 4500 MG: 4; .5 INJECTION, POWDER, LYOPHILIZED, FOR SOLUTION INTRAVENOUS at 08:34

## 2025-02-22 RX ADMIN — POTASSIUM CHLORIDE 40 MEQ: 1500 TABLET, EXTENDED RELEASE ORAL at 07:43

## 2025-02-22 RX ADMIN — LEVOTHYROXINE SODIUM 25 MCG: 0.05 TABLET ORAL at 05:18

## 2025-02-22 RX ADMIN — Medication 7 G: at 02:17

## 2025-02-22 ASSESSMENT — PAIN DESCRIPTION - ORIENTATION
ORIENTATION: MID
ORIENTATION: MID;UPPER
ORIENTATION: MID;LOWER

## 2025-02-22 ASSESSMENT — PAIN SCALES - GENERAL
PAINLEVEL_OUTOF10: 0
PAINLEVEL_OUTOF10: 0
PAINLEVEL_OUTOF10: 7
PAINLEVEL_OUTOF10: 7
PAINLEVEL_OUTOF10: 8
PAINLEVEL_OUTOF10: 7

## 2025-02-22 ASSESSMENT — PAIN DESCRIPTION - DESCRIPTORS
DESCRIPTORS: DISCOMFORT;CRAMPING
DESCRIPTORS: CRAMPING
DESCRIPTORS: OTHER (COMMENT)
DESCRIPTORS: OTHER (COMMENT)

## 2025-02-22 ASSESSMENT — PAIN DESCRIPTION - LOCATION
LOCATION: ABDOMEN

## 2025-02-22 NOTE — H&P
[facility] Initial History and Physical Note    Patient: Bindu Garduno Date: 2/22/2025  female, 64 y.o.  Admit Date: 2/22/2025  Attending: Sean Regan MD     ASSESSMENT AND PLAN:     Principal Problem:    SBO (small bowel obstruction) (HCC)  Plan: Evident on CT. Robotic assisted gastric bypass surgery 2 weeks ago with Dr. Sanchez on 2/11. General surgery aware, requests hospitalist admit. NPO. IVF. IV nausea/pain control. General surgery consult. Follow clinically.  Active Problems:    Iron deficiency anemia  Plan: Follow CB    Benign essential hypertension  Plan: Stable. Continue home meds.    Depressive disorder  Plan: Stable. Continue home meds.    Hypothyroidism  Plan: Stable. Continue home meds.    Morbid obesity  Plan: STable    Hyperlipemia  Plan: Stable. Continue home meds.        DVT Prophylaxis: Lovenox      Code Status: FULL CODE      Disposition: Admit to med/surg for evaluation and treatment as per above.      Anticipated discharge: 3-4 days     CHIEF COMPLAINT:  abdominal pain, nausea    HISTORY OF PRESENT ILLNESS:      Patient Active Problem List   Diagnosis    Angular cheilitis    Baker cyst    Benign essential hypertension    Chronic back pain    Chronic hip pain    Depressive disorder    Diverticulosis    Episodic tension-type headache, not intractable    Folic acid deficiency    Vitamin D deficiency    H/O: hysterectomy    Hiatal hernia    History of cholecystectomy    Hypothyroidism    Intertrigo    Iron deficiency anemia    Mass of left adrenal gland    Obstructive sleep apnea syndrome    Oropharyngeal dysphagia    Peripheral edema    Arthritis    Schatzki's ring    Spondylosis of lumbar region without myelopathy or radiculopathy    Vertigo    Morbid obesity    Hyperlipemia    SBO (small bowel obstruction) (HCC)    Small bowel obstruction (HCC)       Bindu Garduno is a 64 y.o. female, with a history of  has a past medical history of Arthritis, Hyperlipidemia, Hypertension, Sleep 
on 2/22/2025    ProviderAnisha MD     Social History     Tobacco Use    Smoking status: Never    Smokeless tobacco: Never   Vaping Use    Vaping status: Never Used   Substance Use Topics    Alcohol use: Never    Drug use: Never     No family history on file.  .  Allergies   Allergen Reactions    Acetaminophen-Codeine Nausea And Vomiting    Hydrocodone-Acetaminophen      Other Reaction(s): Itching-Allergy, Nausea and/or vomiting-Intolerance    Hydrocodone Itching    Other Nausea Only and Nausea And Vomiting     \"laughing gas causes nausea\"       Recent Results (from the past 24 hour(s))   CBC with Auto Differential    Collection Time: 02/22/25  2:25 AM   Result Value Ref Range    WBC 9.6 4.3 - 11.1 K/uL    RBC 3.64 (L) 4.05 - 5.2 M/uL    Hemoglobin 11.6 (L) 11.7 - 15.4 g/dL    Hematocrit 36.2 35.8 - 46.3 %    MCV 99.5 82.0 - 102.0 FL    MCH 31.9 26.1 - 32.9 PG    MCHC 32.0 31.4 - 35.0 g/dL    RDW 14.1 11.9 - 14.6 %    Platelets 375 150 - 450 K/uL    MPV 9.2 (L) 9.4 - 12.3 FL    nRBC 0.00 0.0 - 0.2 K/uL    Differential Type AUTOMATED      Neutrophils % 50.1 43.0 - 78.0 %    Lymphocytes % 42.4 13.0 - 44.0 %    Monocytes % 6.1 4.0 - 12.0 %    Eosinophils % 0.5 0.5 - 7.8 %    Basophils % 0.3 0.0 - 2.0 %    Immature Granulocytes % 0.6 0.0 - 5.0 %    Neutrophils Absolute 4.79 1.70 - 8.20 K/UL    Lymphocytes Absolute 4.06 0.50 - 4.60 K/UL    Monocytes Absolute 0.58 0.10 - 1.30 K/UL    Eosinophils Absolute 0.05 0.00 - 0.80 K/UL    Basophils Absolute 0.03 0.00 - 0.20 K/UL    Immature Granulocytes Absolute 0.06 0.0 - 0.5 K/UL   Comprehensive Metabolic Panel w/ Reflex to MG    Collection Time: 02/22/25  2:25 AM   Result Value Ref Range    Sodium 143 136 - 145 mmol/L    Potassium 3.2 (L) 3.5 - 5.1 mmol/L    Chloride 108 (H) 98 - 107 mmol/L    CO2 23 20 - 29 mmol/L    Anion Gap 12 7 - 16 mmol/L    Glucose 110 (H) 70 - 99 mg/dL    BUN 6 (L) 8 - 23 MG/DL    Creatinine 0.72 0.60 - 1.10 MG/DL    Est, Glom Filt Rate >90 >60

## 2025-02-22 NOTE — CARE COORDINATION
RNCM met with patient in room 364 to discuss discharge planning.     Patient presents to assessment alert and oriented, and answers questions appropriately.  Patient typically lives at home spouse.  At baseline, patient is independent with ADLs.    Patient anticipates the need for placement or home health services. Patient states she would like to use [facility] for any necessary home health services.     Demographics verified. Patient has [employer] Medicare.  PCP is Dr. Allison Loja.    Patient discharge needs are undetermined at this time. Case management will continue to follow.  Please notify if there are any changes.          02/22/25 1006   Service Assessment   Patient Orientation Alert and Oriented;Person;Place;Situation;Self   Cognition Alert   History Provided By Patient   Primary Caregiver Self   Accompanied By/Relationship spouse   Support Systems Children;Spouse/Significant Other   Patient's Healthcare Decision Maker is: Legal Next of Kin  (Yury Garduno, spouse, [phone])   PCP Verified by CM Yes   Last Visit to PCP Within last 3 months   Prior Functional Level Independent in ADLs/IADLs   Current Functional Level Independent in ADLs/IADLs   Can patient return to prior living arrangement Yes   Ability to make needs known: Good   Family able to assist with home care needs: Yes   Would you like for me to discuss the discharge plan with any other family members/significant others, and if so, who? Yes  (spouse)   Financial Resources Medicare   Community Resources None   Social/Functional History   Lives With Spouse   Type of Home House   Home Layout One level   Home Access Level entry   Bathroom Equipment 3-in-1 Commode   Home Equipment Walker - Rolling;Rollator   Active  Yes   Occupation Unemployed   Discharge Planning   Type of Residence House   Living Arrangements Spouse/Significant Other   Current Services Prior To Admission None   Potential Assistance Needed N/A   DME Ordered? No   Potential

## 2025-02-22 NOTE — PLAN OF CARE
Problem: Discharge Planning  Goal: Discharge to home or other facility with appropriate resources  Outcome: Progressing     Problem: Safety - Adult  Goal: Free from fall injury  Outcome: Progressing     Problem: Skin/Tissue Integrity - Adult  Goal: Incisions, wounds, or drain sites healing without S/S of infection  Outcome: Progressing     Problem: Gastrointestinal - Adult  Goal: Minimal or absence of nausea and vomiting  Outcome: Progressing  Goal: Maintains or returns to baseline bowel function  Outcome: Progressing

## 2025-02-22 NOTE — PLAN OF CARE
Problem: Discharge Planning  Goal: Discharge to home or other facility with appropriate resources  Outcome: Progressing     Problem: Gastrointestinal - Adult  Goal: Minimal or absence of nausea and vomiting  Outcome: Progressing  Goal: Maintains or returns to baseline bowel function  Outcome: Progressing     Problem: Chronic Conditions and Co-morbidities  Goal: Patient's chronic conditions and co-morbidity symptoms are monitored and maintained or improved  Outcome: Progressing     Problem: Safety - Adult  Goal: Free from fall injury  Outcome: Adequate for Discharge     Problem: Skin/Tissue Integrity - Adult  Goal: Incisions, wounds, or drain sites healing without S/S of infection  Outcome: Adequate for Discharge

## 2025-02-23 LAB
ALBUMIN SERPL-MCNC: 3.4 G/DL (ref 3.2–4.6)
ALBUMIN/GLOB SERPL: 1.1 (ref 1–1.9)
ALP SERPL-CCNC: 55 U/L (ref 35–104)
ALT SERPL-CCNC: 22 U/L (ref 8–45)
ANION GAP SERPL CALC-SCNC: 10 MMOL/L (ref 7–16)
AST SERPL-CCNC: 19 U/L (ref 15–37)
BASOPHILS # BLD: 0.03 K/UL (ref 0–0.2)
BASOPHILS NFR BLD: 0.4 % (ref 0–2)
BILIRUB SERPL-MCNC: 0.3 MG/DL (ref 0–1.2)
BUN SERPL-MCNC: 8 MG/DL (ref 8–23)
CALCIUM SERPL-MCNC: 9 MG/DL (ref 8.8–10.2)
CHLORIDE SERPL-SCNC: 110 MMOL/L (ref 98–107)
CO2 SERPL-SCNC: 22 MMOL/L (ref 20–29)
CREAT SERPL-MCNC: 0.77 MG/DL (ref 0.6–1.1)
DIFFERENTIAL METHOD BLD: ABNORMAL
EOSINOPHIL # BLD: 0.16 K/UL (ref 0–0.8)
EOSINOPHIL NFR BLD: 2.1 % (ref 0.5–7.8)
ERYTHROCYTE [DISTWIDTH] IN BLOOD BY AUTOMATED COUNT: 14.4 % (ref 11.9–14.6)
GLOBULIN SER CALC-MCNC: 3.2 G/DL (ref 2.3–3.5)
GLUCOSE BLD STRIP.AUTO-MCNC: 111 MG/DL (ref 65–100)
GLUCOSE BLD STRIP.AUTO-MCNC: 119 MG/DL (ref 65–100)
GLUCOSE BLD STRIP.AUTO-MCNC: 122 MG/DL (ref 65–100)
GLUCOSE BLD STRIP.AUTO-MCNC: 98 MG/DL (ref 65–100)
GLUCOSE SERPL-MCNC: 90 MG/DL (ref 70–99)
HCT VFR BLD AUTO: 34.1 % (ref 35.8–46.3)
HGB BLD-MCNC: 10.8 G/DL (ref 11.7–15.4)
IMM GRANULOCYTES # BLD AUTO: 0.05 K/UL (ref 0–0.5)
IMM GRANULOCYTES NFR BLD AUTO: 0.6 % (ref 0–5)
LYMPHOCYTES # BLD: 3.82 K/UL (ref 0.5–4.6)
LYMPHOCYTES NFR BLD: 49 % (ref 13–44)
MAGNESIUM SERPL-MCNC: 1.8 MG/DL (ref 1.8–2.4)
MCH RBC QN AUTO: 31.9 PG (ref 26.1–32.9)
MCHC RBC AUTO-ENTMCNC: 31.7 G/DL (ref 31.4–35)
MCV RBC AUTO: 100.6 FL (ref 82–102)
MONOCYTES # BLD: 0.65 K/UL (ref 0.1–1.3)
MONOCYTES NFR BLD: 8.3 % (ref 4–12)
NEUTS SEG # BLD: 3.08 K/UL (ref 1.7–8.2)
NEUTS SEG NFR BLD: 39.6 % (ref 43–78)
NRBC # BLD: 0 K/UL (ref 0–0.2)
PLATELET # BLD AUTO: 351 K/UL (ref 150–450)
PMV BLD AUTO: 9.3 FL (ref 9.4–12.3)
POTASSIUM SERPL-SCNC: 3.8 MMOL/L (ref 3.5–5.1)
PROT SERPL-MCNC: 6.6 G/DL (ref 6.3–8.2)
RBC # BLD AUTO: 3.39 M/UL (ref 4.05–5.2)
SERVICE CMNT-IMP: ABNORMAL
SERVICE CMNT-IMP: NORMAL
SODIUM SERPL-SCNC: 142 MMOL/L (ref 136–145)
WBC # BLD AUTO: 7.8 K/UL (ref 4.3–11.1)

## 2025-02-23 PROCEDURE — 2500000003 HC RX 250 WO HCPCS: Performed by: FAMILY MEDICINE

## 2025-02-23 PROCEDURE — 6370000000 HC RX 637 (ALT 250 FOR IP): Performed by: NURSE PRACTITIONER

## 2025-02-23 PROCEDURE — 1100000000 HC RM PRIVATE

## 2025-02-23 PROCEDURE — 83735 ASSAY OF MAGNESIUM: CPT

## 2025-02-23 PROCEDURE — 6360000002 HC RX W HCPCS: Performed by: FAMILY MEDICINE

## 2025-02-23 PROCEDURE — 36415 COLL VENOUS BLD VENIPUNCTURE: CPT

## 2025-02-23 PROCEDURE — 6360000002 HC RX W HCPCS: Performed by: NURSE PRACTITIONER

## 2025-02-23 PROCEDURE — 82962 GLUCOSE BLOOD TEST: CPT

## 2025-02-23 PROCEDURE — 2580000003 HC RX 258: Performed by: NURSE PRACTITIONER

## 2025-02-23 PROCEDURE — 85025 COMPLETE CBC W/AUTO DIFF WBC: CPT

## 2025-02-23 PROCEDURE — 80053 COMPREHEN METABOLIC PANEL: CPT

## 2025-02-23 PROCEDURE — 6370000000 HC RX 637 (ALT 250 FOR IP)

## 2025-02-23 PROCEDURE — 6370000000 HC RX 637 (ALT 250 FOR IP): Performed by: FAMILY MEDICINE

## 2025-02-23 RX ORDER — DIPHENHYDRAMINE HCL 25 MG
25 CAPSULE ORAL EVERY 6 HOURS PRN
Status: DISCONTINUED | OUTPATIENT
Start: 2025-02-23 | End: 2025-02-25 | Stop reason: HOSPADM

## 2025-02-23 RX ADMIN — SODIUM CHLORIDE, PRESERVATIVE FREE 10 ML: 5 INJECTION INTRAVENOUS at 08:23

## 2025-02-23 RX ADMIN — OMEGA-3-ACID ETHYL ESTERS CAPSULES 1 G: 1 CAPSULE, LIQUID FILLED ORAL at 08:21

## 2025-02-23 RX ADMIN — ENOXAPARIN SODIUM 30 MG: 100 INJECTION SUBCUTANEOUS at 08:18

## 2025-02-23 RX ADMIN — SERTRALINE 100 MG: 100 TABLET, FILM COATED ORAL at 08:21

## 2025-02-23 RX ADMIN — DIPHENHYDRAMINE HYDROCHLORIDE 25 MG: 25 CAPSULE ORAL at 08:22

## 2025-02-23 RX ADMIN — PANTOPRAZOLE SODIUM 40 MG: 40 TABLET, DELAYED RELEASE ORAL at 06:06

## 2025-02-23 RX ADMIN — OMEGA-3-ACID ETHYL ESTERS CAPSULES 1 G: 1 CAPSULE, LIQUID FILLED ORAL at 20:16

## 2025-02-23 RX ADMIN — ENOXAPARIN SODIUM 30 MG: 100 INJECTION SUBCUTANEOUS at 20:16

## 2025-02-23 RX ADMIN — AMLODIPINE BESYLATE 10 MG: 10 TABLET ORAL at 08:21

## 2025-02-23 RX ADMIN — Medication 3 MG: at 20:16

## 2025-02-23 RX ADMIN — DIPHENHYDRAMINE HYDROCHLORIDE 25 MG: 25 CAPSULE ORAL at 20:16

## 2025-02-23 RX ADMIN — FUROSEMIDE 20 MG: 20 TABLET ORAL at 20:16

## 2025-02-23 RX ADMIN — KETOROLAC TROMETHAMINE 15 MG: 15 INJECTION, SOLUTION INTRAMUSCULAR; INTRAVENOUS at 08:16

## 2025-02-23 RX ADMIN — FUROSEMIDE 20 MG: 20 TABLET ORAL at 08:19

## 2025-02-23 RX ADMIN — BUPROPION HYDROCHLORIDE 75 MG: 75 TABLET, FILM COATED ORAL at 08:20

## 2025-02-23 RX ADMIN — FERROUS SULFATE TAB 325 MG (65 MG ELEMENTAL FE) 325 MG: 325 (65 FE) TAB at 08:20

## 2025-02-23 RX ADMIN — BUPROPION HYDROCHLORIDE 75 MG: 75 TABLET, FILM COATED ORAL at 20:16

## 2025-02-23 RX ADMIN — SODIUM CHLORIDE, PRESERVATIVE FREE 10 ML: 5 INJECTION INTRAVENOUS at 20:16

## 2025-02-23 RX ADMIN — PIPERACILLIN AND TAZOBACTAM 4500 MG: 4; .5 INJECTION, POWDER, FOR SOLUTION INTRAVENOUS at 06:29

## 2025-02-23 RX ADMIN — FOLIC ACID 1 MG: 1 TABLET ORAL at 08:20

## 2025-02-23 RX ADMIN — LEVOTHYROXINE SODIUM 25 MCG: 0.05 TABLET ORAL at 06:06

## 2025-02-23 RX ADMIN — POTASSIUM CHLORIDE 40 MEQ: 1500 TABLET, EXTENDED RELEASE ORAL at 08:21

## 2025-02-23 RX ADMIN — ATORVASTATIN CALCIUM 10 MG: 10 TABLET, FILM COATED ORAL at 20:16

## 2025-02-23 RX ADMIN — OXYCODONE 10 MG: 5 TABLET ORAL at 08:19

## 2025-02-23 ASSESSMENT — PAIN SCALES - GENERAL
PAINLEVEL_OUTOF10: 5
PAINLEVEL_OUTOF10: 0

## 2025-02-23 ASSESSMENT — PAIN DESCRIPTION - DESCRIPTORS: DESCRIPTORS: THROBBING

## 2025-02-23 ASSESSMENT — PAIN DESCRIPTION - LOCATION: LOCATION: ABDOMEN

## 2025-02-23 ASSESSMENT — PAIN DESCRIPTION - ORIENTATION: ORIENTATION: MID;UPPER

## 2025-02-23 NOTE — PLAN OF CARE
Problem: Discharge Planning  Goal: Discharge to home or other facility with appropriate resources  2/23/2025 0407 by Zayda Bustos RN  Outcome: Progressing  2/22/2025 1641 by Edgar Reyes RN  Outcome: Progressing     Problem: Safety - Adult  Goal: Free from fall injury  2/23/2025 0407 by Zayda Bustos RN  Outcome: Progressing  2/22/2025 1641 by Edgar Reyes RN  Outcome: Adequate for Discharge     Problem: Skin/Tissue Integrity - Adult  Goal: Incisions, wounds, or drain sites healing without S/S of infection  2/23/2025 0407 by Zayda Bustos RN  Outcome: Progressing  2/22/2025 1641 by Edgar Reyes RN  Outcome: Adequate for Discharge     Problem: Gastrointestinal - Adult  Goal: Minimal or absence of nausea and vomiting  2/23/2025 0407 by Zayda Bustos RN  Outcome: Progressing  2/22/2025 1641 by Edgar Reyes RN  Outcome: Progressing  Goal: Maintains or returns to baseline bowel function  2/23/2025 0407 by Zayda Bustos RN  Outcome: Progressing  2/22/2025 1641 by Edgar Reyes RN  Outcome: Progressing     Problem: Chronic Conditions and Co-morbidities  Goal: Patient's chronic conditions and co-morbidity symptoms are monitored and maintained or improved  2/23/2025 0407 by Zayda Bustos RN  Outcome: Progressing  2/22/2025 1641 by Edgar Reyes RN  Outcome: Progressing

## 2025-02-23 NOTE — PLAN OF CARE
Problem: Discharge Planning  Goal: Discharge to home or other facility with appropriate resources  2/23/2025 1545 by Edgar Reyes RN  Outcome: Progressing  2/23/2025 0407 by Zayda Bustos RN  Outcome: Progressing     Problem: Gastrointestinal - Adult  Goal: Minimal or absence of nausea and vomiting  2/23/2025 1545 by Edgar Reyes RN  Outcome: Progressing  2/23/2025 0407 by Zayda Bustos RN  Outcome: Progressing  Goal: Maintains or returns to baseline bowel function  2/23/2025 1545 by Edgar Reyes RN  Outcome: Progressing  2/23/2025 0407 by Zayda Bustos RN  Outcome: Progressing     Problem: Safety - Adult  Goal: Free from fall injury  2/23/2025 1545 by Edgar Reyes RN  Outcome: Adequate for Discharge  2/23/2025 0407 by Zayda Bustos RN  Outcome: Progressing     Problem: Skin/Tissue Integrity - Adult  Goal: Incisions, wounds, or drain sites healing without S/S of infection  2/23/2025 1545 by Edgar Reyes RN  Outcome: Adequate for Discharge  2/23/2025 0407 by Zayda Bustos RN  Outcome: Progressing     Problem: Chronic Conditions and Co-morbidities  Goal: Patient's chronic conditions and co-morbidity symptoms are monitored and maintained or improved  2/23/2025 1545 by Edgar Reyes RN  Outcome: Adequate for Discharge  2/23/2025 0407 by Zayda Bustos RN  Outcome: Progressing     Problem: Pain  Goal: Verbalizes/displays adequate comfort level or baseline comfort level  Outcome: Adequate for Discharge

## 2025-02-24 ENCOUNTER — APPOINTMENT (OUTPATIENT)
Dept: GENERAL RADIOLOGY | Age: 65
DRG: 394 | End: 2025-02-24
Attending: FAMILY MEDICINE
Payer: MEDICARE

## 2025-02-24 ENCOUNTER — APPOINTMENT (OUTPATIENT)
Dept: CT IMAGING | Age: 65
DRG: 394 | End: 2025-02-24
Attending: FAMILY MEDICINE
Payer: MEDICARE

## 2025-02-24 LAB
ALBUMIN SERPL-MCNC: 3.4 G/DL (ref 3.2–4.6)
ALBUMIN/GLOB SERPL: 1.2 (ref 1–1.9)
ALP SERPL-CCNC: 57 U/L (ref 35–104)
ALT SERPL-CCNC: 24 U/L (ref 8–45)
ANION GAP SERPL CALC-SCNC: 9 MMOL/L (ref 7–16)
AST SERPL-CCNC: 19 U/L (ref 15–37)
BASOPHILS # BLD: 0.04 K/UL (ref 0–0.2)
BASOPHILS NFR BLD: 0.6 % (ref 0–2)
BILIRUB SERPL-MCNC: 0.3 MG/DL (ref 0–1.2)
BUN SERPL-MCNC: 4 MG/DL (ref 8–23)
CALCIUM SERPL-MCNC: 9.3 MG/DL (ref 8.8–10.2)
CHLORIDE SERPL-SCNC: 106 MMOL/L (ref 98–107)
CO2 SERPL-SCNC: 25 MMOL/L (ref 20–29)
CREAT SERPL-MCNC: 0.72 MG/DL (ref 0.6–1.1)
DIFFERENTIAL METHOD BLD: ABNORMAL
EOSINOPHIL # BLD: 0.17 K/UL (ref 0–0.8)
EOSINOPHIL NFR BLD: 2.6 % (ref 0.5–7.8)
ERYTHROCYTE [DISTWIDTH] IN BLOOD BY AUTOMATED COUNT: 14.1 % (ref 11.9–14.6)
GLOBULIN SER CALC-MCNC: 2.9 G/DL (ref 2.3–3.5)
GLUCOSE BLD STRIP.AUTO-MCNC: 104 MG/DL (ref 65–100)
GLUCOSE BLD STRIP.AUTO-MCNC: 105 MG/DL (ref 65–100)
GLUCOSE BLD STRIP.AUTO-MCNC: 107 MG/DL (ref 65–100)
GLUCOSE BLD STRIP.AUTO-MCNC: 97 MG/DL (ref 65–100)
GLUCOSE SERPL-MCNC: 98 MG/DL (ref 70–99)
HCT VFR BLD AUTO: 33.2 % (ref 35.8–46.3)
HGB BLD-MCNC: 10.6 G/DL (ref 11.7–15.4)
IMM GRANULOCYTES # BLD AUTO: 0.04 K/UL (ref 0–0.5)
IMM GRANULOCYTES NFR BLD AUTO: 0.6 % (ref 0–5)
LYMPHOCYTES # BLD: 3.55 K/UL (ref 0.5–4.6)
LYMPHOCYTES NFR BLD: 54.2 % (ref 13–44)
MCH RBC QN AUTO: 31.9 PG (ref 26.1–32.9)
MCHC RBC AUTO-ENTMCNC: 31.9 G/DL (ref 31.4–35)
MCV RBC AUTO: 100 FL (ref 82–102)
MONOCYTES # BLD: 0.54 K/UL (ref 0.1–1.3)
MONOCYTES NFR BLD: 8.2 % (ref 4–12)
NEUTS SEG # BLD: 2.21 K/UL (ref 1.7–8.2)
NEUTS SEG NFR BLD: 33.8 % (ref 43–78)
NRBC # BLD: 0 K/UL (ref 0–0.2)
PLATELET # BLD AUTO: 337 K/UL (ref 150–450)
PMV BLD AUTO: 9.3 FL (ref 9.4–12.3)
POTASSIUM SERPL-SCNC: 3.9 MMOL/L (ref 3.5–5.1)
PROT SERPL-MCNC: 6.3 G/DL (ref 6.3–8.2)
RBC # BLD AUTO: 3.32 M/UL (ref 4.05–5.2)
SERVICE CMNT-IMP: ABNORMAL
SERVICE CMNT-IMP: NORMAL
SODIUM SERPL-SCNC: 140 MMOL/L (ref 136–145)
WBC # BLD AUTO: 6.6 K/UL (ref 4.3–11.1)

## 2025-02-24 PROCEDURE — 2500000003 HC RX 250 WO HCPCS: Performed by: FAMILY MEDICINE

## 2025-02-24 PROCEDURE — 6370000000 HC RX 637 (ALT 250 FOR IP): Performed by: NURSE PRACTITIONER

## 2025-02-24 PROCEDURE — 6360000004 HC RX CONTRAST MEDICATION: Performed by: SURGERY

## 2025-02-24 PROCEDURE — 1100000000 HC RM PRIVATE

## 2025-02-24 PROCEDURE — 74240 X-RAY XM UPR GI TRC 1CNTRST: CPT

## 2025-02-24 PROCEDURE — 85025 COMPLETE CBC W/AUTO DIFF WBC: CPT

## 2025-02-24 PROCEDURE — 6370000000 HC RX 637 (ALT 250 FOR IP)

## 2025-02-24 PROCEDURE — 71260 CT THORAX DX C+: CPT

## 2025-02-24 PROCEDURE — 74018 RADEX ABDOMEN 1 VIEW: CPT

## 2025-02-24 PROCEDURE — 6370000000 HC RX 637 (ALT 250 FOR IP): Performed by: FAMILY MEDICINE

## 2025-02-24 PROCEDURE — 6360000002 HC RX W HCPCS: Performed by: FAMILY MEDICINE

## 2025-02-24 PROCEDURE — 80053 COMPREHEN METABOLIC PANEL: CPT

## 2025-02-24 PROCEDURE — 82962 GLUCOSE BLOOD TEST: CPT

## 2025-02-24 PROCEDURE — APPSS30 APP SPLIT SHARED TIME 16-30 MINUTES: Performed by: PHYSICIAN ASSISTANT

## 2025-02-24 PROCEDURE — 36415 COLL VENOUS BLD VENIPUNCTURE: CPT

## 2025-02-24 RX ORDER — IOPAMIDOL 755 MG/ML
80 INJECTION, SOLUTION INTRAVASCULAR
Status: COMPLETED | OUTPATIENT
Start: 2025-02-24 | End: 2025-02-24

## 2025-02-24 RX ORDER — DIATRIZOATE MEGLUMINE AND DIATRIZOATE SODIUM 660; 100 MG/ML; MG/ML
120 SOLUTION ORAL; RECTAL
Status: DISCONTINUED | OUTPATIENT
Start: 2025-02-24 | End: 2025-02-25 | Stop reason: HOSPADM

## 2025-02-24 RX ADMIN — ENOXAPARIN SODIUM 30 MG: 100 INJECTION SUBCUTANEOUS at 10:22

## 2025-02-24 RX ADMIN — Medication 3 MG: at 21:26

## 2025-02-24 RX ADMIN — POTASSIUM CHLORIDE 40 MEQ: 1500 TABLET, EXTENDED RELEASE ORAL at 10:20

## 2025-02-24 RX ADMIN — OMEGA-3-ACID ETHYL ESTERS CAPSULES 1 G: 1 CAPSULE, LIQUID FILLED ORAL at 10:21

## 2025-02-24 RX ADMIN — FUROSEMIDE 20 MG: 20 TABLET ORAL at 10:21

## 2025-02-24 RX ADMIN — PANTOPRAZOLE SODIUM 40 MG: 40 TABLET, DELAYED RELEASE ORAL at 06:02

## 2025-02-24 RX ADMIN — FOLIC ACID 1 MG: 1 TABLET ORAL at 10:21

## 2025-02-24 RX ADMIN — SERTRALINE 100 MG: 100 TABLET, FILM COATED ORAL at 10:21

## 2025-02-24 RX ADMIN — SODIUM CHLORIDE, PRESERVATIVE FREE 10 ML: 5 INJECTION INTRAVENOUS at 10:22

## 2025-02-24 RX ADMIN — BUPROPION HYDROCHLORIDE 75 MG: 75 TABLET, FILM COATED ORAL at 10:21

## 2025-02-24 RX ADMIN — FUROSEMIDE 20 MG: 20 TABLET ORAL at 21:26

## 2025-02-24 RX ADMIN — FERROUS SULFATE TAB 325 MG (65 MG ELEMENTAL FE) 325 MG: 325 (65 FE) TAB at 10:21

## 2025-02-24 RX ADMIN — ENOXAPARIN SODIUM 30 MG: 100 INJECTION SUBCUTANEOUS at 21:26

## 2025-02-24 RX ADMIN — SODIUM CHLORIDE, PRESERVATIVE FREE 10 ML: 5 INJECTION INTRAVENOUS at 21:26

## 2025-02-24 RX ADMIN — BUPROPION HYDROCHLORIDE 75 MG: 75 TABLET, FILM COATED ORAL at 21:26

## 2025-02-24 RX ADMIN — DIATRIZOATE MEGLUMINE AND DIATRIZOATE SODIUM 120 ML: 660; 100 LIQUID ORAL; RECTAL at 08:28

## 2025-02-24 RX ADMIN — OXYCODONE 10 MG: 5 TABLET ORAL at 18:06

## 2025-02-24 RX ADMIN — OXYCODONE 10 MG: 5 TABLET ORAL at 10:21

## 2025-02-24 RX ADMIN — ATORVASTATIN CALCIUM 10 MG: 10 TABLET, FILM COATED ORAL at 21:26

## 2025-02-24 RX ADMIN — OMEGA-3-ACID ETHYL ESTERS CAPSULES 1 G: 1 CAPSULE, LIQUID FILLED ORAL at 21:26

## 2025-02-24 RX ADMIN — LEVOTHYROXINE SODIUM 25 MCG: 0.05 TABLET ORAL at 06:02

## 2025-02-24 RX ADMIN — AMLODIPINE BESYLATE 10 MG: 10 TABLET ORAL at 10:21

## 2025-02-24 RX ADMIN — IOPAMIDOL 80 ML: 755 INJECTION, SOLUTION INTRAVENOUS at 07:43

## 2025-02-24 RX ADMIN — DIPHENHYDRAMINE HYDROCHLORIDE 25 MG: 25 CAPSULE ORAL at 21:26

## 2025-02-24 ASSESSMENT — PAIN SCALES - GENERAL
PAINLEVEL_OUTOF10: 9
PAINLEVEL_OUTOF10: 0
PAINLEVEL_OUTOF10: 9

## 2025-02-24 ASSESSMENT — PAIN DESCRIPTION - ORIENTATION: ORIENTATION: LEFT;RIGHT

## 2025-02-24 ASSESSMENT — PAIN DESCRIPTION - LOCATION
LOCATION: KNEE
LOCATION: ABDOMEN

## 2025-02-24 NOTE — PLAN OF CARE
Problem: Discharge Planning  Goal: Discharge to home or other facility with appropriate resources  2/23/2025 2212 by Breann Griggs RN  Outcome: Progressing  2/23/2025 1545 by Edgar Reyes RN  Outcome: Progressing     Problem: Safety - Adult  Goal: Free from fall injury  2/23/2025 2212 by Breann Griggs RN  Outcome: Progressing  2/23/2025 1545 by Edgar Reyes RN  Outcome: Adequate for Discharge     Problem: Skin/Tissue Integrity - Adult  Goal: Incisions, wounds, or drain sites healing without S/S of infection  2/23/2025 2212 by Breann Griggs RN  Outcome: Progressing  2/23/2025 1545 by Edgar Reyes RN  Outcome: Adequate for Discharge     Problem: Gastrointestinal - Adult  Goal: Minimal or absence of nausea and vomiting  2/23/2025 2212 by Breann Griggs RN  Outcome: Progressing  2/23/2025 1545 by Edgar Reyes RN  Outcome: Progressing  Goal: Maintains or returns to baseline bowel function  2/23/2025 1545 by Edgar Reyes RN  Outcome: Progressing     Problem: Chronic Conditions and Co-morbidities  Goal: Patient's chronic conditions and co-morbidity symptoms are monitored and maintained or improved  2/23/2025 2212 by Breann Griggs RN  Outcome: Progressing  2/23/2025 1545 by Edgar Reyes RN  Outcome: Adequate for Discharge     Problem: Pain  Goal: Verbalizes/displays adequate comfort level or baseline comfort level  2/23/2025 2212 by Breann Griggs RN  Outcome: Progressing  2/23/2025 1545 by Edgar Reyes RN  Outcome: Adequate for Discharge

## 2025-02-24 NOTE — PLAN OF CARE
Problem: Discharge Planning  Goal: Discharge to home or other facility with appropriate resources  2/24/2025 1110 by Casie Pena RN  Outcome: Progressing  2/24/2025 1110 by Casie Pena RN  Outcome: Progressing  2/23/2025 2212 by Breann Griggs RN  Outcome: Progressing     Problem: Safety - Adult  Goal: Free from fall injury  2/24/2025 1110 by Casie Pena RN  Outcome: Progressing  2/24/2025 1110 by Casie Pena RN  Outcome: Progressing  2/23/2025 2212 by Breann Griggs RN  Outcome: Progressing     Problem: Skin/Tissue Integrity - Adult  Goal: Incisions, wounds, or drain sites healing without S/S of infection  2/24/2025 1110 by Casie Pena RN  Outcome: Progressing  2/24/2025 1110 by Casie Pena RN  Outcome: Progressing  2/23/2025 2212 by Breann Griggs RN  Outcome: Progressing     Problem: Gastrointestinal - Adult  Goal: Minimal or absence of nausea and vomiting  2/24/2025 1110 by Casie Pena RN  Outcome: Progressing  2/24/2025 1110 by Casie Pena RN  Outcome: Progressing  2/23/2025 2212 by Breann Griggs RN  Outcome: Progressing  Goal: Maintains or returns to baseline bowel function  Outcome: Progressing     Problem: Chronic Conditions and Co-morbidities  Goal: Patient's chronic conditions and co-morbidity symptoms are monitored and maintained or improved  2/24/2025 1110 by Casie Pena RN  Outcome: Progressing  2/23/2025 2212 by Breann Griggs RN  Outcome: Progressing     Problem: Pain  Goal: Verbalizes/displays adequate comfort level or baseline comfort level  2/24/2025 1110 by Casie Pena RN  Outcome: Progressing  2/23/2025 2212 by Breann Griggs RN  Outcome: Progressing

## 2025-02-24 NOTE — CARE COORDINATION
Chart reviewed by RNCM and discussed in IDR     CM following for continued stay.     Patient likely to DC later today vs tomorrow.    [facility] was interested in [facility]  RNCM explained to patient that this is a home caregiver service (non medical) and will have to pay for out of pocket.  Provided patient their contact information should she wish to use their services in the future.    Patient was agreeable to have a referral sent to a home health service.  Referral sent to interim.     Please consult case management if any additional discharge needs arise.

## 2025-02-25 ENCOUNTER — APPOINTMENT (OUTPATIENT)
Dept: GENERAL RADIOLOGY | Age: 65
DRG: 394 | End: 2025-02-25
Attending: FAMILY MEDICINE
Payer: MEDICARE

## 2025-02-25 VITALS
BODY MASS INDEX: 45.99 KG/M2 | HEART RATE: 94 BPM | RESPIRATION RATE: 16 BRPM | HEIGHT: 67 IN | OXYGEN SATURATION: 98 % | TEMPERATURE: 98.7 F | SYSTOLIC BLOOD PRESSURE: 121 MMHG | DIASTOLIC BLOOD PRESSURE: 86 MMHG | WEIGHT: 293 LBS

## 2025-02-25 PROBLEM — E83.42 HYPOMAGNESEMIA: Status: RESOLVED | Noted: 2025-02-22 | Resolved: 2025-02-25

## 2025-02-25 PROBLEM — E87.6 HYPOKALEMIA: Status: RESOLVED | Noted: 2025-02-22 | Resolved: 2025-02-25

## 2025-02-25 PROBLEM — K56.609 SBO (SMALL BOWEL OBSTRUCTION) (HCC): Status: RESOLVED | Noted: 2025-02-22 | Resolved: 2025-02-25

## 2025-02-25 PROBLEM — K56.609 SMALL BOWEL OBSTRUCTION (HCC): Status: RESOLVED | Noted: 2025-02-22 | Resolved: 2025-02-25

## 2025-02-25 LAB
ALBUMIN SERPL-MCNC: 3.4 G/DL (ref 3.2–4.6)
ALBUMIN/GLOB SERPL: 0.9 (ref 1–1.9)
ALP SERPL-CCNC: 60 U/L (ref 35–104)
ALT SERPL-CCNC: 21 U/L (ref 8–45)
ANION GAP SERPL CALC-SCNC: 12 MMOL/L (ref 7–16)
AST SERPL-CCNC: 19 U/L (ref 15–37)
BASOPHILS # BLD: 0.03 K/UL (ref 0–0.2)
BASOPHILS NFR BLD: 0.4 % (ref 0–2)
BILIRUB SERPL-MCNC: 0.3 MG/DL (ref 0–1.2)
BUN SERPL-MCNC: 2 MG/DL (ref 8–23)
CALCIUM SERPL-MCNC: 9.5 MG/DL (ref 8.8–10.2)
CHLORIDE SERPL-SCNC: 105 MMOL/L (ref 98–107)
CO2 SERPL-SCNC: 26 MMOL/L (ref 20–29)
CREAT SERPL-MCNC: 0.66 MG/DL (ref 0.6–1.1)
DIFFERENTIAL METHOD BLD: ABNORMAL
EOSINOPHIL # BLD: 0.14 K/UL (ref 0–0.8)
EOSINOPHIL NFR BLD: 1.9 % (ref 0.5–7.8)
ERYTHROCYTE [DISTWIDTH] IN BLOOD BY AUTOMATED COUNT: 14 % (ref 11.9–14.6)
GLOBULIN SER CALC-MCNC: 3.7 G/DL (ref 2.3–3.5)
GLUCOSE BLD STRIP.AUTO-MCNC: 106 MG/DL (ref 65–100)
GLUCOSE BLD STRIP.AUTO-MCNC: 113 MG/DL (ref 65–100)
GLUCOSE SERPL-MCNC: 108 MG/DL (ref 70–99)
HCT VFR BLD AUTO: 35.3 % (ref 35.8–46.3)
HGB BLD-MCNC: 11.1 G/DL (ref 11.7–15.4)
IMM GRANULOCYTES # BLD AUTO: 0.04 K/UL (ref 0–0.5)
IMM GRANULOCYTES NFR BLD AUTO: 0.5 % (ref 0–5)
LYMPHOCYTES # BLD: 3.96 K/UL (ref 0.5–4.6)
LYMPHOCYTES NFR BLD: 54.4 % (ref 13–44)
MCH RBC QN AUTO: 31.3 PG (ref 26.1–32.9)
MCHC RBC AUTO-ENTMCNC: 31.4 G/DL (ref 31.4–35)
MCV RBC AUTO: 99.4 FL (ref 82–102)
MONOCYTES # BLD: 0.44 K/UL (ref 0.1–1.3)
MONOCYTES NFR BLD: 6 % (ref 4–12)
NEUTS SEG # BLD: 2.67 K/UL (ref 1.7–8.2)
NEUTS SEG NFR BLD: 36.8 % (ref 43–78)
NRBC # BLD: 0 K/UL (ref 0–0.2)
PLATELET # BLD AUTO: 349 K/UL (ref 150–450)
PMV BLD AUTO: 9.3 FL (ref 9.4–12.3)
POTASSIUM SERPL-SCNC: 3.9 MMOL/L (ref 3.5–5.1)
PROT SERPL-MCNC: 7 G/DL (ref 6.3–8.2)
RBC # BLD AUTO: 3.55 M/UL (ref 4.05–5.2)
SERVICE CMNT-IMP: ABNORMAL
SERVICE CMNT-IMP: ABNORMAL
SODIUM SERPL-SCNC: 143 MMOL/L (ref 136–145)
WBC # BLD AUTO: 7.3 K/UL (ref 4.3–11.1)

## 2025-02-25 PROCEDURE — 80053 COMPREHEN METABOLIC PANEL: CPT

## 2025-02-25 PROCEDURE — 2500000003 HC RX 250 WO HCPCS: Performed by: FAMILY MEDICINE

## 2025-02-25 PROCEDURE — 6360000002 HC RX W HCPCS: Performed by: FAMILY MEDICINE

## 2025-02-25 PROCEDURE — 82962 GLUCOSE BLOOD TEST: CPT

## 2025-02-25 PROCEDURE — 85025 COMPLETE CBC W/AUTO DIFF WBC: CPT

## 2025-02-25 PROCEDURE — APPSS30 APP SPLIT SHARED TIME 16-30 MINUTES: Performed by: PHYSICIAN ASSISTANT

## 2025-02-25 PROCEDURE — 6370000000 HC RX 637 (ALT 250 FOR IP): Performed by: FAMILY MEDICINE

## 2025-02-25 PROCEDURE — 74018 RADEX ABDOMEN 1 VIEW: CPT

## 2025-02-25 PROCEDURE — 36415 COLL VENOUS BLD VENIPUNCTURE: CPT

## 2025-02-25 RX ADMIN — LEVOTHYROXINE SODIUM 25 MCG: 0.05 TABLET ORAL at 06:02

## 2025-02-25 RX ADMIN — PANTOPRAZOLE SODIUM 40 MG: 40 TABLET, DELAYED RELEASE ORAL at 06:03

## 2025-02-25 RX ADMIN — ENOXAPARIN SODIUM 30 MG: 100 INJECTION SUBCUTANEOUS at 08:23

## 2025-02-25 RX ADMIN — FOLIC ACID 1 MG: 1 TABLET ORAL at 08:22

## 2025-02-25 RX ADMIN — SERTRALINE 100 MG: 100 TABLET, FILM COATED ORAL at 08:22

## 2025-02-25 RX ADMIN — SODIUM CHLORIDE, PRESERVATIVE FREE 10 ML: 5 INJECTION INTRAVENOUS at 08:22

## 2025-02-25 RX ADMIN — BUPROPION HYDROCHLORIDE 75 MG: 75 TABLET, FILM COATED ORAL at 08:22

## 2025-02-25 RX ADMIN — OMEGA-3-ACID ETHYL ESTERS CAPSULES 1 G: 1 CAPSULE, LIQUID FILLED ORAL at 08:22

## 2025-02-25 RX ADMIN — FERROUS SULFATE TAB 325 MG (65 MG ELEMENTAL FE) 325 MG: 325 (65 FE) TAB at 08:22

## 2025-02-25 RX ADMIN — AMLODIPINE BESYLATE 10 MG: 10 TABLET ORAL at 08:22

## 2025-02-25 RX ADMIN — FUROSEMIDE 20 MG: 20 TABLET ORAL at 08:22

## 2025-02-25 NOTE — PLAN OF CARE
Problem: Discharge Planning  Goal: Discharge to home or other facility with appropriate resources  Outcome: Progressing     Problem: Safety - Adult  Goal: Free from fall injury  Outcome: Progressing     Problem: Skin/Tissue Integrity - Adult  Goal: Incisions, wounds, or drain sites healing without S/S of infection  Outcome: Progressing     Problem: Gastrointestinal - Adult  Goal: Minimal or absence of nausea and vomiting  Outcome: Progressing  Goal: Maintains or returns to baseline bowel function  Outcome: Progressing     Problem: Chronic Conditions and Co-morbidities  Goal: Patient's chronic conditions and co-morbidity symptoms are monitored and maintained or improved  Outcome: Progressing     Problem: Pain  Goal: Verbalizes/displays adequate comfort level or baseline comfort level  Outcome: Progressing

## 2025-02-25 NOTE — PROGRESS NOTES
Hospitalist Progress Note   Admit Date:  2025 12:55 AM   Name:  Bindu Garduno   Age:  64 y.o.  Sex:  female  :  1960   MRN:  [id number]   Room:  364/01    Presenting Complaint: No chief complaint on file.     Reason(s) for Admission: SBO (small bowel obstruction) ([facility]) [K56.609]  Small bowel obstruction (HCC) [K56.609]     Hospital Course:   Bindu Garduno is a 64 y.o. female with medical history of arthritis, hyperlipidemia, hypertension, sleep apnea, thyroid disease, functional diarrhea, obesity,  who it  appears underwent a robot-assisted gastric bypass surgery approximately 2 weeks ago with Dr. Sanchez on 25, who presented to [facility] and subsequently transferred to [facility] with complaints of severe mid abdominal pain starting approximately 12 hours prior to arrival.  Patient was also complaining of nausea without vomiting.  No bowel movement in the days prior.  Patient was not passing gas.  Hypoactive bowel sounds.  Patient denied fever, chills, shortness of breath and chest pain.  Patient had elevated blood pressure upon arrival at 147/98, pulse 101.  She is afebrile at 98.2.  She is satting 98% on room air.  Patient is a diabetic and uses oral metformin.  Last known A1c 2024 was 5.2.  Labs from [facility] show a troponin of 6 which is normal, lipase 15, sodium 139, potassium 3.2, blood glucose 117, creatinine 0.72, T. bili 0.4, ALT 11, AST 12, alk phos 53, WBC count 9.8, hemoglobin 12.3, platelets 391, lactic acid 0.8, repeat troponin 6.   Labs upon arrival at [facility] show a magnesium low at 1.7, potassium low at 3.2, creatinine 0.72, blood glucose 110, WBC count 9.6, hemoglobin 11.6.  Chest x-ray from [facility] reviewed with mild cardiomegaly, severe osteoarthritis but no pneumothorax, consolidation or effusion.  CT abdomen showed a small bowel obstruction with imaging concerning for closed-loop type.   Admitting physician states that surgery was 
  Lydia Smith PA-C  Comprehensive Medical & Surgical Weight Loss    Bariatric Surgery Daily Progress Note    Patient: Bindu Garduno  MRN: [id number]  Date: 2/25/2025 8:11 AM  Admit Date: 2/22/2025      Subjective:     Bindu Garduno is a 64 y.o. female who is admitted for small bowel obstruction. Denies abdominal pain, nausea or vomiting. She is voiding without difficulty and is had a bowel movement last evening and this morning. She is concerned about the biopsy results.      Objective:     MEDS:    Current Facility-Administered Medications   Medication Dose Route Frequency Provider Last Rate Last Admin    diatrizoate meglumine-sodium (GASTROGRAFIN) 66-10 % solution 120 mL  120 mL Oral ONCE PRN Lizbeth Almonte MD   120 mL at 02/24/25 0828    diphenhydrAMINE (BENADRYL) capsule 25 mg  25 mg Oral Q6H PRN Donna Norman APRN - CNP   25 mg at 02/24/25 2126    melatonin tablet 3 mg  3 mg Oral Nightly PRN Aguila Eckert MD   3 mg at 02/24/25 2126    morphine (PF) injection 2 mg  2 mg IntraVENous Q3H PRN Sean Regan MD   2 mg at 02/22/25 2130    amLODIPine (NORVASC) tablet 10 mg  10 mg Oral Daily Sean Regan MD   10 mg at 02/24/25 1021    buPROPion (WELLBUTRIN) tablet 75 mg  75 mg Oral BID Sean Regan MD   75 mg at 02/24/25 2126    ferrous sulfate (IRON 325) tablet 325 mg  325 mg Oral Daily Sean Regan MD   325 mg at 02/24/25 1021    folic acid (FOLVITE) tablet 1 mg  1 mg Oral Daily Sean Regan MD   1 mg at 02/24/25 1021    furosemide (LASIX) tablet 20 mg  20 mg Oral BID Sean Regan MD   20 mg at 02/24/25 2126    omega-3 acid ethyl esters (LOVAZA) capsule 1 g  1 g Oral BID Sean Regan MD   1 g at 02/24/25 2126    levothyroxine (SYNTHROID) tablet 25 mcg  25 mcg Oral QAM AC Sean Regan MD   25 mcg at 02/25/25 0602    atorvastatin (LIPITOR) tablet 10 mg  10 mg Oral Daily Sean Regan MD   10 mg at 02/24/25 2126    [Held by provider] metFORMIN (GLUCOPHAGE) tablet 
  Lydia Smith PA-C  Comprehensive Medical & Surgical Weight Loss    Bariatric Surgery Daily Progress Note    Patient: Bindu Garduno  MRN: [id number]  Date: 2/24/2025 12:19 PM  Admit Date: 2/22/2025    Subjective:     Bindu Garduno is a 64 y.o. female who was admitted on 02/22/2025 for partial small bowel obstruction. She reports that she is doing well today and feeling good. She denies abdominal pain, nausea or vomiting. She has been voiding without difficulty and last bowel movement was yesterday.     Objective:     MEDS:    Current Facility-Administered Medications   Medication Dose Route Frequency Provider Last Rate Last Admin    diatrizoate meglumine-sodium (GASTROGRAFIN) 66-10 % solution 120 mL  120 mL Oral ONCE PRN Lizbeth Almonte MD   120 mL at 02/24/25 0828    diphenhydrAMINE (BENADRYL) capsule 25 mg  25 mg Oral Q6H PRN Donna Norman APRN - CNP   25 mg at 02/23/25 2016    melatonin tablet 3 mg  3 mg Oral Nightly PRN Aguila Eckert MD   3 mg at 02/23/25 2016    morphine (PF) injection 2 mg  2 mg IntraVENous Q3H PRN Sean Regan MD   2 mg at 02/22/25 2130    amLODIPine (NORVASC) tablet 10 mg  10 mg Oral Daily Sean Regan MD   10 mg at 02/24/25 1021    buPROPion (WELLBUTRIN) tablet 75 mg  75 mg Oral BID Sean Regan MD   75 mg at 02/24/25 1021    ferrous sulfate (IRON 325) tablet 325 mg  325 mg Oral Daily Sean Regan MD   325 mg at 02/24/25 1021    folic acid (FOLVITE) tablet 1 mg  1 mg Oral Daily Sean Regan MD   1 mg at 02/24/25 1021    furosemide (LASIX) tablet 20 mg  20 mg Oral BID Sean Regan MD   20 mg at 02/24/25 1021    omega-3 acid ethyl esters (LOVAZA) capsule 1 g  1 g Oral BID Sean Regan MD   1 g at 02/24/25 1021    levothyroxine (SYNTHROID) tablet 25 mcg  25 mcg Oral QAM AC Sean Regan MD   25 mcg at 02/24/25 0602    atorvastatin (LIPITOR) tablet 10 mg  10 mg Oral Daily Sean Regan MD   10 mg at 02/23/25 2016    [Held by provider] 
  Physician Progress Note      PATIENT:               NICHO CARRILLO  CSN #:                  [id number]  :                       1960  ADMIT DATE:       2025 12:55 AM  DISCH DATE:  RESPONDING  PROVIDER #:        MIGUEL HUBBARD          QUERY TEXT:    Pt admitted with SBO.  H&P states \"admitted with a bowel obstruction\" and   \"underwent a robotic RNY gastric bypass with Dr. Sanchez on 25\". If   possible, please document in progress notes and discharge summary the   relationship if any between the SBO and the surgery:  ?  The medical record reflects the following:  Risk Factors: 63yo, recent gastric bypass surgery  Clinical Indicators:  H&P states \"admitted with a bowel obstruction\" and   \"underwent a robotic RNY gastric bypass with Dr. Sanchez on 25\".  Treatment: labs, imaging, surgery consult, IV fluids, Anti-emetics, pain   control    Thank you,  Aurea Lai), RN BSN  Clinical   Options provided:  -- Small bowel obstruction is due to the gastric bypass surgery on   -- Other - I will add my own diagnosis  -- Disagree - Not applicable / Not valid  -- Disagree - Clinically unable to determine / Unknown  -- Refer to Clinical Documentation Reviewer    PROVIDER RESPONSE TEXT:    Patient has small bowel obstruction due to the gastric bypass surgery on .    Query created by: Aurea Alejandro on 2025 3:15 PM      Electronically signed by:  MIGUEL HUBBARD 2025 3:20 PM          
General Surgery Progress Note    2/23/2025    Admit Date: 2/22/2025    Subjective:   Surgery On Call  The patient still has mild RUQ/epigastric tenderness. She had a BM yesterday. No further nausea. Abdominal X-rays from 2/22/25 showed:    INDICATION: Check bowel obstruction and contrast advancement after CT scan done  2/21/25.     COMPARISON: Reviewed        TECHNIQUE: Flat and upright views of the abdomen were obtained.     FINDINGS: The bowel gas pattern is normal. There is no evidence of obstruction.  There is no free air. There are no renal calculi. The lung bases are clear. Oral  contrast not definitively identified.     IMPRESSION:  No acute findings in the abdomen      Objective:     /72   Pulse 87   Temp 98.2 °F (36.8 °C) (Oral)   Resp 16   Ht 1.702 m (5' 7\")   Wt 136.1 kg (300 lb)   SpO2 94%   BMI 46.99 kg/m²       Intake/Output Summary (Last 24 hours) at 2/23/2025 0914  Last data filed at 2/22/2025 1632  Gross per 24 hour   Intake 50 ml   Output --   Net 50 ml        EXAM:  ABD soft, obese, very mild RUQ tenderness to palpation, active BS's.        Data Review    Recent Results (from the past 24 hour(s))   POCT Glucose    Collection Time: 02/22/25 11:46 AM   Result Value Ref Range    POC Glucose 113 (H) 65 - 100 mg/dL    Performed by: Kamilah    POCT Glucose    Collection Time: 02/22/25  4:12 PM   Result Value Ref Range    POC Glucose 102 (H) 65 - 100 mg/dL    Performed by: Jamia    POCT Glucose    Collection Time: 02/22/25  8:37 PM   Result Value Ref Range    POC Glucose 101 (H) 65 - 100 mg/dL    Performed by: Jannet    CBC with Auto Differential    Collection Time: 02/23/25  5:47 AM   Result Value Ref Range    WBC 7.8 4.3 - 11.1 K/uL    RBC 3.39 (L) 4.05 - 5.2 M/uL    Hemoglobin 10.8 (L) 11.7 - 15.4 g/dL    Hematocrit 34.1 (L) 35.8 - 46.3 %    .6 82.0 - 102.0 FL    MCH 31.9 26.1 - 32.9 PG    MCHC 31.7 31.4 - 35.0 g/dL    RDW 14.4 11.9 - 14.6 %    
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     
TRANSFER - IN REPORT:    Verbal report received from Niko Bose on Bindu Garduno  being received from [facility] for routine progression of patient care      Report consisted of patient's Situation, Background, Assessment and   Recommendations(SBAR).     Information from the following report(s) Nurse Handoff Report was reviewed with the receiving nurse.    Opportunity for questions and clarification was provided.      Assessment completed upon patient's arrival to unit and care assumed.     
pt had gastric bypass 2/11/25  On PPI  2/22; Gen/surg ordered abdominal xray. Nothing acute noted  2/23: Gen/surg advanced pt to bariatric CLD. Stopped zosyn. Dec IV flds. OOB.  Monitor labs and for fever  2/24: continue on bariatric CLD, xray in the am.   Full Code    Active Problems:    Benign essential hypertension  Plan: BP now 118/72  Continue on daily norvasc 10mg daily  There is also an order for PRN hydralazine IV if needed, with parameters      Depressive disorder  Plan: May continue on home meds zoloft 100 mg daily & Wellbutrin 75mg daily      Hypothyroidism  Plan: Last TSH 0.905 on 11/11/24  Continue on hold med synthroid 25 mcg daily      Iron deficiency anemia  Plan: noted; Hgb here 11.6 - baseline  Last iron panel was 8/2024.   Iron then was 55, ferritin 102, folate 28  Pt is on daily iron and folate supplements  Monitor for drops in hgb or s/sx of bleeding      Morbid obesity  Plan: noted; pt just underwent gastric bypass surgery 2/11  Exercise encouraged      Hyperlipemia  Plan: continue on lipitor 10 mg daily   Last lipid panel was 8/2024; , tri 211, HDL 69, LDL 44     Hypokalemia  Plan: 3.2; replaced. Now 3.9   monitor      Hypomagnesemia  Plan: 1.7; replaced. Now 1.8.  Monitor        T2DM wo insulin use  Plan: A1c 4.9  Last A1c in August 2024 was 5.2  HOLD oral metformin  Finger sticks AC/HS with SSI coverage as needed for correction  On CLD for now, then she'll need a pureed /smooth low carb diet  Hypoglycemic protocol in place      PT/OT evals and PPD needed/ordered?  No  Diet:  BARIATRIC DIET; Bariatric Clear Liquid  VTE prophylaxis: Lovenox  Code status: Full Code    Hospital Problems:  Principal Problem:    SBO (small bowel obstruction) (HCC)  Active Problems:    Benign essential hypertension    Depressive disorder    Hypothyroidism    Iron deficiency anemia    Morbid obesity    Hyperlipemia    Small bowel obstruction (HCC)    Hypokalemia    Hypomagnesemia    Type 2 diabetes mellitus 
°F (36.8 °C) (!) 101 16 (!) 147/98 98 %       Oxygen Therapy  SpO2: 98 %    Estimated body mass index is 46.99 kg/m² as calculated from the following:    Height as of this encounter: 1.702 m (5' 7\").    Weight as of this encounter: 136.1 kg (300 lb).  No intake or output data in the 24 hours ending 02/22/25 0728      Physical Exam:   Blood pressure (!) 147/98, pulse (!) 101, temperature 98.2 °F (36.8 °C), temperature source Oral, resp. rate 16, height 1.702 m (5' 7\"), weight 136.1 kg (300 lb), SpO2 98%.  General:    Well nourished. Morbid obesity  Head:  Normocephalic, atraumatic  Eyes:  Sclerae appear normal.  Pupils equally round.  ENT:  Nares appear normal.  Moist oral mucosa  Neck:  No restricted ROM.  Trachea midline   CV:   RRR.  No m/r/g.  No jugular venous distension.  Lungs:   CTAB. No wheezing, rhonchi, not on o2. Symmetric expansion.  Abdomen:   Large but soft abdomen. Some general tenderness on exam, mild distention, hypoactive bowel sounds.   Extremities: No cyanosis or clubbing. No edema  Skin:     No rashes and normal coloration. Warm and dry.    Neuro:  CN II-XII grossly intact. A&O x4, moves extremities, answers questions, follows directions, no confusion.     Psych:  Normal mood and affect.     I have personally reviewed labs and tests:  Recent Labs:  Recent Results (from the past 48 hour(s))   CBC with Auto Differential    Collection Time: 02/22/25  2:25 AM   Result Value Ref Range    WBC 9.6 4.3 - 11.1 K/uL    RBC 3.64 (L) 4.05 - 5.2 M/uL    Hemoglobin 11.6 (L) 11.7 - 15.4 g/dL    Hematocrit 36.2 35.8 - 46.3 %    MCV 99.5 82.0 - 102.0 FL    MCH 31.9 26.1 - 32.9 PG    MCHC 32.0 31.4 - 35.0 g/dL    RDW 14.1 11.9 - 14.6 %    Platelets 375 150 - 450 K/uL    MPV 9.2 (L) 9.4 - 12.3 FL    nRBC 0.00 0.0 - 0.2 K/uL    Differential Type AUTOMATED      Neutrophils % 50.1 43.0 - 78.0 %    Lymphocytes % 42.4 13.0 - 44.0 %    Monocytes % 6.1 4.0 - 12.0 %    Eosinophils % 0.5 0.5 - 7.8 %    Basophils % 0.3 0.0

## 2025-02-25 NOTE — CARE COORDINATION
Patient with discharge orders for today.  Home health arranged through [facility]: SN, OT, MSW, and HHA.  RNCM also provided patient with information regarding how to contact [facility] should she wish to use their services in the future. No additional needs made known to CM. Patient has met all treatment goals and milestones for discharge. Family to provide transportation home. CM following until patient is discharged.        02/25/25 1112   Services At/After Discharge   Transition of Care Consult (CM Consult) Home Health   Internal Home Health No   Reason Outside Agency Chosen Managed care specific requirement   Services At/After Discharge OT;Nursing services  (HHA, MSW)    Resource Information Provided? No   Mode of Transport at Discharge Other (see comment)  (Family)   Confirm Follow Up Transport Family   Condition of Participation: Discharge Planning   The Plan for Transition of Care is related to the following treatment goals: Patient to dicharge home with home health and return to baseline level of function.   The Patient and/or Patient Representative was provided with a Choice of Provider? Patient   The Patient and/Or Patient Representative agree with the Discharge Plan? Yes   Freedom of Choice list was provided with basic dialogue that supports the patient's individualized plan of care/goals, treatment preferences, and shares the quality data associated with the providers?  Yes

## 2025-02-25 NOTE — PLAN OF CARE
Problem: Discharge Planning  Goal: Discharge to home or other facility with appropriate resources  2/24/2025 2021 by Breann Griggs RN  Outcome: Progressing  2/24/2025 1110 by Casie Pena RN  Outcome: Progressing  2/24/2025 1110 by Casie Pena RN  Outcome: Progressing     Problem: Safety - Adult  Goal: Free from fall injury  2/24/2025 2021 by Breann Griggs RN  Outcome: Progressing  2/24/2025 1110 by Casie Pena RN  Outcome: Progressing  2/24/2025 1110 by Casie Pena RN  Outcome: Progressing     Problem: Skin/Tissue Integrity - Adult  Goal: Incisions, wounds, or drain sites healing without S/S of infection  2/24/2025 2021 by Breann Griggs RN  Outcome: Progressing  2/24/2025 1110 by Casie Pena RN  Outcome: Progressing  2/24/2025 1110 by Casie Pena RN  Outcome: Progressing     Problem: Gastrointestinal - Adult  Goal: Minimal or absence of nausea and vomiting  2/24/2025 2021 by Breann Griggs RN  Outcome: Progressing  2/24/2025 1110 by Casie Pena RN  Outcome: Progressing  2/24/2025 1110 by Casie Pena RN  Outcome: Progressing  Goal: Maintains or returns to baseline bowel function  2/24/2025 1110 by Casie Pena RN  Outcome: Progressing     Problem: Chronic Conditions and Co-morbidities  Goal: Patient's chronic conditions and co-morbidity symptoms are monitored and maintained or improved  2/24/2025 2021 by Breann Griggs RN  Outcome: Progressing  2/24/2025 1110 by Casie Pena RN  Outcome: Progressing     Problem: Pain  Goal: Verbalizes/displays adequate comfort level or baseline comfort level  2/24/2025 2021 by Breann Griggs RN  Outcome: Progressing  2/24/2025 1110 by Casie Pena RN  Outcome: Progressing

## 2025-02-25 NOTE — DISCHARGE SUMMARY
MONOPCT 8.3 8.2 6.0   BASOPCT 0.4 0.6 0.4   IMMGRAN 0.6 0.6 0.5   LYMPHSABS 3.82 3.55 3.96   EOSABS 0.16 0.17 0.14   MONOSABS 0.65 0.54 0.44   BASOSABS 0.03 0.04 0.03   ABSIMMGRAN 0.05 0.04 0.04      LFT Recent Labs     02/23/25  0547 02/24/25  0541 02/25/25  0530   BILITOT 0.3 0.3 0.3   ALKPHOS 55 57 60   AST 19 19 19   ALT 22 24 21   PROTEIN 6.6 6.3 7.0   ALBUMIN 3.4 3.4 3.4   GLOB 3.2 2.9 3.7*      Cardiac  No results found for: \"NTPROBNP\", \"TROPHS\"   Coags No results found for: \"PROTIME\", \"INR\", \"APTT\"   A1c Lab Results   Component Value Date/Time    LABA1C 4.9 02/22/2025 02:25 AM    LABA1C 5.2 08/15/2024 03:28 PM    LABA1C 5.0 01/30/2024 10:18 AM    EAG 94 02/22/2025 02:25 AM     08/15/2024 03:28 PM    EAG 97 01/30/2024 10:18 AM      Lipids Lab Results   Component Value Date/Time    CHOL 155 08/15/2024 03:28 PM    HDL 69 08/15/2024 03:28 PM    CHOLHDLRATIO 2.3 08/15/2024 03:28 PM    TRIG 211 08/15/2024 03:28 PM      Thyroid  No results found for: \"TSHELE\", \"CWY9CXZ\"     Most Recent UA No results found for: \"COLORU\", \"APPEARANCE\", \"SPECGRAV\", \"LABPH\", \"PROTEINU\", \"GLUCOSEU\", \"KETUA\", \"BILIRUBINUR\", \"BLOODU\", \"UROBILINOGEN\", \"NITRU\", \"LEUKOCYTESUR\", \"WBCUA\", \"RBCUA\", \"BACTERIA\", \"LABCAST\", \"MUCUS\"     Microbiology:  Results       ** No results found for the last 336 hours. **            All Labs from Last 24 Hrs:  Recent Results (from the past 24 hour(s))   POCT Glucose    Collection Time: 02/24/25 11:10 AM   Result Value Ref Range    POC Glucose 105 (H) 65 - 100 mg/dL    Performed by: BridgesEmilyPCT    POCT Glucose    Collection Time: 02/24/25  4:26 PM   Result Value Ref Range    POC Glucose 107 (H) 65 - 100 mg/dL    Performed by: BridgesEmilyPCT    POCT Glucose    Collection Time: 02/24/25  7:56 PM   Result Value Ref Range    POC Glucose 97 65 - 100 mg/dL    Performed by: Melissa)PCT    CBC with Auto Differential    Collection Time: 02/25/25  5:30 AM   Result Value Ref Range    WBC 7.3

## 2025-02-27 ENCOUNTER — OFFICE VISIT (OUTPATIENT)
Dept: SURGERY | Age: 65
End: 2025-02-27

## 2025-02-27 VITALS
DIASTOLIC BLOOD PRESSURE: 87 MMHG | SYSTOLIC BLOOD PRESSURE: 149 MMHG | BODY MASS INDEX: 45.99 KG/M2 | HEART RATE: 92 BPM | HEIGHT: 67 IN | WEIGHT: 293 LBS

## 2025-02-27 DIAGNOSIS — E66.01 MORBID OBESITY: Primary | ICD-10-CM

## 2025-02-27 DIAGNOSIS — C45.9 MESOTHELIOMA (HCC): ICD-10-CM

## 2025-02-27 DIAGNOSIS — E66.01 OBESITY, CLASS III, BMI 40-49.9 (MORBID OBESITY): ICD-10-CM

## 2025-02-27 DIAGNOSIS — Z98.84 S/P GASTRIC BYPASS: ICD-10-CM

## 2025-02-27 DIAGNOSIS — Z71.3 NUTRITIONAL COUNSELING: ICD-10-CM

## 2025-02-27 DIAGNOSIS — Z71.82 EXERCISE COUNSELING: ICD-10-CM

## 2025-02-27 PROCEDURE — 99024 POSTOP FOLLOW-UP VISIT: CPT | Performed by: PHYSICIAN ASSISTANT

## 2025-02-27 NOTE — PROGRESS NOTES
Ldyia Smith PA-C  Comprehensive Medical & Surgical Weight Loss      Date of visit: 2025          Name: Bindu Garduno      MRN: [id number]       : 1960       Sex: female  PCP: Allison Loja MD     Surgeon: Dr. Lizbeth Zurita  Procedure: robotic assisted gastric bypass     Surgeon: Daniel   DOS: 2025   Procedure: Bypass   Pre-op weight: 324   Ideal body weight: 150   Excess body weight: 174      Weight History Graph  Last Surgical Weight Loss:      2025     5:00 AM 2025     1:23 PM 2025    10:17 AM   Surgical Weight Loss Tracker   Consult Date 2024     Initial Height 5' 5\"     Initial Weight 323 lb     Initial BMI 53.74     Ideal Body Weight 150 lb     Surgery Date 2025     Pre-Surgical Height 5' 5\"     Pre-Surgical Weight 324 lb     Pre Surgery BMI 53.91     Weight to Lose 174 lb     Date  2025   Height  5' 7\" 5' 7\"   Weight  313 lb 302 lb   BMI  49.02 47.29   Weight Change  -11 lb -11 lb   Total Weight Change  -11 lb -22 lb   % EBWL  6% 13%      Subjective   2 weeks post-op visit after a robotic assisted gastric bypass was done on 2025.   She has lost 11 lbs since her last office visit.     Clinical Assessment:    She presents for hospital follow up after partial small bowel obstruction.  She is doing very well today and is feeling good.  She reports that she has been adhering to the liquid diet without difficulty since being discharged from the hospital.  She denies any abdominal pain, nausea or vomiting or heartburn.  She denies fevers or chills, or any redness or drainage from the incision sites.  She does not report having problems with constipation but notes persistent diarrhea.  Her pain is well controlled and is not taking pain medications.  She has been taking the PPI without difficulty.  She has been doing her protein shakes without difficulty.  She also has not yet heard from oncology about scheduling an

## 2025-02-27 NOTE — PROGRESS NOTES
[facility] NUTRITION REASSESSMENT    Assessment:  Pt is 2 weeks s/p RYGB.    Pt was recently discharged from the hospital for a SBO after eating grapes (pt was on pureed/smooth diet during this time). Pt has been doing well with full liquid compliance since being discharged from the hospital.    Intake of ~20g protein/d and ~40oz fluid/d.   Pt is eating broth; and drinking water and protein shake.   Pt is taking MVI and Ca supplements as recommended.  Pt reports no stomach pain.     Intervention:   Discussed food choices and meal ideas on pureed/smooth diet, once released by surgeon.   Encouraged pt to follow mindful eating behaviors, lean protein focus followed by non-starchy vegetables as able.  Encouraged pt use protein supplements throughout the day as snacks rather than as meal replacement.    Encouraged continued and increased activity.      Monitoring and Evaluation:  Monitor for continued safe, supervised weight loss for RYGB.     Monitor pt for meeting protein requirements of 60-80g/d, 64+ fl oz fluids.  Follow for tolerance of pureed/smooth diet.  F/U per MD Karen Hernández MBA. RD, LD

## 2025-03-04 ENCOUNTER — CLINICAL DOCUMENTATION (OUTPATIENT)
Dept: CASE MANAGEMENT | Age: 65
End: 2025-03-04

## 2025-03-04 SDOH — ECONOMIC STABILITY: FOOD INSECURITY: WITHIN THE PAST 12 MONTHS, THE FOOD YOU BOUGHT JUST DIDN'T LAST AND YOU DIDN'T HAVE MONEY TO GET MORE.: NEVER TRUE

## 2025-03-04 SDOH — ECONOMIC STABILITY: INCOME INSECURITY: IN THE LAST 12 MONTHS, WAS THERE A TIME WHEN YOU WERE NOT ABLE TO PAY THE MORTGAGE OR RENT ON TIME?: NO

## 2025-03-04 SDOH — HEALTH STABILITY: PHYSICAL HEALTH: ON AVERAGE, HOW MANY DAYS PER WEEK DO YOU ENGAGE IN MODERATE TO STRENUOUS EXERCISE (LIKE A BRISK WALK)?: 0 DAYS

## 2025-03-04 SDOH — ECONOMIC STABILITY: FOOD INSECURITY: WITHIN THE PAST 12 MONTHS, YOU WORRIED THAT YOUR FOOD WOULD RUN OUT BEFORE YOU GOT MONEY TO BUY MORE.: NEVER TRUE

## 2025-03-04 SDOH — ECONOMIC STABILITY: TRANSPORTATION INSECURITY
IN THE PAST 12 MONTHS, HAS THE LACK OF TRANSPORTATION KEPT YOU FROM MEDICAL APPOINTMENTS OR FROM GETTING MEDICATIONS?: NO

## 2025-03-04 SDOH — HEALTH STABILITY: PHYSICAL HEALTH: ON AVERAGE, HOW MANY MINUTES DO YOU ENGAGE IN EXERCISE AT THIS LEVEL?: 0 MIN

## 2025-03-04 SDOH — ECONOMIC STABILITY: TRANSPORTATION INSECURITY
IN THE PAST 12 MONTHS, HAS LACK OF TRANSPORTATION KEPT YOU FROM MEETINGS, WORK, OR FROM GETTING THINGS NEEDED FOR DAILY LIVING?: NO

## 2025-03-04 ASSESSMENT — LIFESTYLE VARIABLES
HOW MANY STANDARD DRINKS CONTAINING ALCOHOL DO YOU HAVE ON A TYPICAL DAY: PATIENT DOES NOT DRINK
HOW OFTEN DO YOU HAVE A DRINK CONTAINING ALCOHOL: NEVER

## 2025-03-04 ASSESSMENT — SOCIAL DETERMINANTS OF HEALTH (SDOH)
IN A TYPICAL WEEK, HOW MANY TIMES DO YOU TALK ON THE PHONE WITH FAMILY, FRIENDS, OR NEIGHBORS?: MORE THAN THREE TIMES A WEEK
DO YOU BELONG TO ANY CLUBS OR ORGANIZATIONS SUCH AS CHURCH GROUPS UNIONS, FRATERNAL OR ATHLETIC GROUPS, OR SCHOOL GROUPS?: NO
WITHIN THE LAST YEAR, HAVE YOU BEEN HUMILIATED OR EMOTIONALLY ABUSED IN OTHER WAYS BY YOUR PARTNER OR EX-PARTNER?: NO
HOW OFTEN DO YOU ATTEND CHURCH OR RELIGIOUS SERVICES?: MORE THAN 4 TIMES PER YEAR
WITHIN THE LAST YEAR, HAVE YOU BEEN KICKED, HIT, SLAPPED, OR OTHERWISE PHYSICALLY HURT BY YOUR PARTNER OR EX-PARTNER?: NO
HOW OFTEN DO YOU GET TOGETHER WITH FRIENDS OR RELATIVES?: MORE THAN THREE TIMES A WEEK
WITHIN THE LAST YEAR, HAVE TO BEEN RAPED OR FORCED TO HAVE ANY KIND OF SEXUAL ACTIVITY BY YOUR PARTNER OR EX-PARTNER?: NO
HOW HARD IS IT FOR YOU TO PAY FOR THE VERY BASICS LIKE FOOD, HOUSING, MEDICAL CARE, AND HEATING?: NOT HARD AT ALL
WITHIN THE LAST YEAR, HAVE YOU BEEN AFRAID OF YOUR PARTNER OR EX-PARTNER?: NO

## 2025-03-04 NOTE — PROGRESS NOTES
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    Past Medical History:   Past Medical History:   Diagnosis Date    Arthritis 04/20/2022    Hyperlipidemia     Hypertension     managed with med    Sleep apnea     Hx of sleep apnea years ago-does not required c pap at present time    Thyroid disease     managed with meds    Type 2 diabetes mellitus without complication, without long-term current use of insulin ([facility]) 2/22/2025       Family/ Social/ Medical/ Surgical History Updated in Epic: Yes    Chronological History of Pertinent Events (From Onset of Presenting Symptoms): Bindu Garduno is a 64 y.o. female s/p RYGB with omentum biopsy.  Pathology: Omentum, biopsy: Well-differentiated papillary mesothelial tumor      2/21/25-CT Abdomen Pelvis W/:  (See Imaging in Epic)    2/24/25-CT Chest W/: IMPRESSION:  1. No CT evidence of acute process in the chest, including no evidence of  suspicious pulmonary nodule or mass and no thoracic lymphadenopathy.  2. Partially imaged prior Mihaela-en-Y gastric bypass. No free gas in the imaged  portions of the upper abdomen.    Record located in [facility] and Care Everywhere.      Pertinent Notes from Referring Provider: there was a suspicious biopsied peritoneal nodule with concern for papillary mesothelioma.     Other Pertinent Information: CT chest done and no obvious findings to correlate with possible mesothelioma.

## 2025-03-04 NOTE — PROGRESS NOTES
Nurse Navigation outreach related to intake    Navigation Intake 3/4/2025    I reviewed family history of cancer, Oncology Care Team, patient's personal history of cancer, and Social Determinants of Health. The role of navigation services, how to communicate with office, pending work up, pending appointments, and MyChart enrollment and use have been reviewed and updated.  Referring provider notified of intake and upcoming appointments.    Pathology: 2/11/25    Appointment with Oncology: 3/5/25    My Chart message to patient with navigation contact information and upcoming appointments. 3/4/25  Attached link for calendar for Cancer Support in the Community provided by the [facility] with opportunities for programs both in person and virtually. 3/4/25  Attached link for the [facility] for counseling opportunities, support groups, financial assistance, and general physical support. 3/4/25     Goal of next outreach: follow up after consult  Time Spent: 15 minutes

## 2025-03-05 ENCOUNTER — OFFICE VISIT (OUTPATIENT)
Dept: ONCOLOGY | Age: 65
End: 2025-03-05

## 2025-03-05 VITALS
BODY MASS INDEX: 45.99 KG/M2 | SYSTOLIC BLOOD PRESSURE: 135 MMHG | WEIGHT: 293 LBS | OXYGEN SATURATION: 98 % | TEMPERATURE: 97.7 F | RESPIRATION RATE: 17 BRPM | HEART RATE: 94 BPM | DIASTOLIC BLOOD PRESSURE: 83 MMHG | HEIGHT: 67 IN

## 2025-03-05 DIAGNOSIS — C45.0: Primary | ICD-10-CM

## 2025-03-05 ASSESSMENT — PATIENT HEALTH QUESTIONNAIRE - PHQ9
SUM OF ALL RESPONSES TO PHQ QUESTIONS 1-9: 0
2. FEELING DOWN, DEPRESSED OR HOPELESS: NOT AT ALL
1. LITTLE INTEREST OR PLEASURE IN DOING THINGS: NOT AT ALL
SUM OF ALL RESPONSES TO PHQ QUESTIONS 1-9: 0

## 2025-03-05 NOTE — PATIENT INSTRUCTIONS
Patient Information from Today's Visit    The members of your Oncology Medical Home are listed below:    Physician Provider: Dr. Erick Trejo, Medical Oncologist  Advanced Practice Clinician: N/A  Registered Nurse: Ivon GARCIA RN  Nurse Navigator: Karina TREJO RN  Medical Assistant: Nura MARTINEZ MA  : Yolie TOLLIVER  Supportive Care Services: Drew ESPINO [facility] [place], [place]    Diagnosis: papillary mesothelial tumor    Follow Up Instructions: 4 weeks     A referral to a surgical oncologist has been placed   PET/CT is recommended       Treatment Summary has been discussed and given to patient: N/A      Current Labs: N/A          Please refer to After Visit Summary or MyChart for upcoming appointment information. Please call our office for rescheduling needs at least 24 hours before your scheduled appointment time.If you have any questions regarding your upcoming schedule please reach out to your care team through [employer] or call [phone].     Please notify your assigned Nurse Navigator of any unplanned hospital admissions or Emergency Room visits within 24 hours of discharge.    -------------------------------------------------------------------------------------------------------------------  Please call our office at [phone] if you have any  of the following symptoms:   Fever of 100.5 or greater  Chills  Shortness of breath  Swelling or pain in one leg    After office hours an answering service is available and will contact a provider for emergencies or if you are experiencing any of the above symptoms.        Ivon Abraham RN

## 2025-03-05 NOTE — PROGRESS NOTES
characterize.  I suspect this will be reassuring.  Additionally, we have referred to Dr. Calderon/surgical oncology for consideration of complete resection.    Follow-up 4 weeks    Erick Trejo MD  Hematology and Oncology    [address] [place] [address]  Office : [phone]  Fax : [phone]

## 2025-03-07 ENCOUNTER — CLINICAL DOCUMENTATION (OUTPATIENT)
Dept: CASE MANAGEMENT | Age: 65
End: 2025-03-07

## 2025-03-07 ENCOUNTER — OFFICE VISIT (OUTPATIENT)
Dept: BEHAVIORAL/MENTAL HEALTH CLINIC | Facility: CLINIC | Age: 65
End: 2025-03-07
Payer: MEDICARE

## 2025-03-07 DIAGNOSIS — Z86.59 HISTORY OF POSTTRAUMATIC STRESS DISORDER (PTSD): ICD-10-CM

## 2025-03-07 DIAGNOSIS — Z63.8 FAMILY CONFLICT: ICD-10-CM

## 2025-03-07 DIAGNOSIS — F33.42 MDD (MAJOR DEPRESSIVE DISORDER), RECURRENT, IN FULL REMISSION: Primary | ICD-10-CM

## 2025-03-07 PROCEDURE — 1036F TOBACCO NON-USER: CPT | Performed by: SOCIAL WORKER

## 2025-03-07 PROCEDURE — 90837 PSYTX W PT 60 MINUTES: CPT | Performed by: SOCIAL WORKER

## 2025-03-07 SDOH — SOCIAL STABILITY - SOCIAL INSECURITY: OTHER SPECIFIED PROBLEMS RELATED TO PRIMARY SUPPORT GROUP: Z63.8

## 2025-03-07 NOTE — PROGRESS NOTES
INDIVIDUAL THERAPY NOTE  NAME: Bindu Garduno    DATE: 3/7/2025    TYPE OF SERVICE: Individual Therapy    LOCATION OF SERVICE: [facility]    DURATION: 55 mins    DIAGNOSIS: :    1. MDD (major depressive disorder), recurrent, in full remission    2. History of posttraumatic stress disorder (PTSD)    3. Family conflict        New Symptoms:   [x]No  []Yes:  Progress Status:  [x]Improving []Declining []Stable []Static  Change in Goals:  [x]No  []Yes    Chief Complaint: symptoms of depression and anxiety    Plan: CBT, DBT, CPT, Humanistic/Client Centered, ACT, Seeking Safety, Psychodynamic, ERP, IFS, and Poly Vagal Theory may be used to address symptoms associated with pt diagnosis and goals.    Mental Status Exam:  Pt was appropriately dressed and groomed.  Pt was 0x4. No unusual mannerisms were noted.  No psychotic symptoms displayed by pt.  Pt was cooperative and engaged in the session.  Insight and judgment were intact.  Denied active suicidal or homicidal ideation.  Fund of knowledge and attention span are WNL.  Denies developmental or language difficulties.  Mood/Affect: even with congruent affect  Thought Process: linear and coherent    Summary of Service:  This SW met with the pt face to face in the office.  The pt updated this SW on her mental status.  Goals continued to be addressed using various therapeutic models listed above.  HW is given as deemed appropriate.  Guided questions were asked, support and active listening were provided, distorted thinking was challenged, and strengths were validated and reinforced.  Motivational interviewing questions were asked to promote change.  Other therapeutic techniques were also utilized based upon the pt's needs and issues.    Issues Addressed:       []Trauma  []Grief  [x]Anxiety/Panic Attacks  [x]Depression    [x]Relational Issues/Boundaries  [x]Self Esteem    []Anger Management  []Mood Instability  []Other     Follow Up: 4 weeks        3/5/2025

## 2025-03-07 NOTE — PROGRESS NOTES
Nurse Navigation outreach related to medical oncology consult visit    Nurse navigation reviewed additional testing, appointments, , and chemotherapy education. Oncology Medical Home information and education on how to contact our office was provided via verbally.    Navigation Assessment:  Opportunity provided for Q&A  Scheduled for PET  Scheduled for Surgical Consult    Goal of next outreach: Follow up after next OV for treatment plan  Time Spent: 10 minutes

## 2025-03-07 NOTE — PSYCHOTHERAPY
INDIVIDUAL THERAPY NOTE  NAME: Bindu Garduno      Pt had her surgery 2/11/25 and she has lost 22 lbs.  They did find a cancerous tumor but it has been removed and did not spread.  Her prognosis is excellent.  Still trying to get her own apt.  Dissatisfied in marriage.  Support given.

## 2025-03-10 ENCOUNTER — OFFICE VISIT (OUTPATIENT)
Dept: SURGERY | Age: 65
End: 2025-03-10

## 2025-03-10 VITALS
DIASTOLIC BLOOD PRESSURE: 96 MMHG | HEIGHT: 67 IN | BODY MASS INDEX: 45.99 KG/M2 | WEIGHT: 293 LBS | HEART RATE: 98 BPM | SYSTOLIC BLOOD PRESSURE: 123 MMHG

## 2025-03-10 DIAGNOSIS — Z71.3 NUTRITIONAL COUNSELING: ICD-10-CM

## 2025-03-10 DIAGNOSIS — Z98.84 S/P GASTRIC BYPASS: ICD-10-CM

## 2025-03-10 DIAGNOSIS — E66.01 OBESITY, CLASS III, BMI 40-49.9 (MORBID OBESITY): ICD-10-CM

## 2025-03-10 DIAGNOSIS — E66.01 MORBID OBESITY: Primary | ICD-10-CM

## 2025-03-10 DIAGNOSIS — Z71.82 EXERCISE COUNSELING: ICD-10-CM

## 2025-03-10 DIAGNOSIS — K59.1 FUNCTIONAL DIARRHEA: ICD-10-CM

## 2025-03-10 PROCEDURE — 99024 POSTOP FOLLOW-UP VISIT: CPT | Performed by: PHYSICIAN ASSISTANT

## 2025-03-10 NOTE — PROGRESS NOTES
and aim for 300 minutes a week and include 2 strength session a week to maintain the weight loss.  She will continue recommended vitamin/mineral supplementation and PPI until the prescription is complete.  Discussed follow up with Dr. Calderon for next steps regarding cancer diagnosis.   Return to the office in 2 weeks with myself.    Lydia Smith PA-C  3/10/2025    Time spent: 30 minutes was spent preparing to see patient (including chart review and preparation), obtaining and/or reviewing additional medical history, performing a physical exam and evaluation, documenting clinical information in the EHR, independently interpreting results, communicating results to patient, family or caregiver, and/or coordinating care.

## 2025-03-11 ENCOUNTER — OFFICE VISIT (OUTPATIENT)
Dept: ORTHOPEDIC SURGERY | Age: 65
End: 2025-03-11

## 2025-03-11 DIAGNOSIS — M17.12 PRIMARY OSTEOARTHRITIS OF LEFT KNEE: ICD-10-CM

## 2025-03-11 DIAGNOSIS — M17.11 PRIMARY OSTEOARTHRITIS OF RIGHT KNEE: ICD-10-CM

## 2025-03-11 RX ORDER — METHYLPREDNISOLONE ACETATE 40 MG/ML
80 INJECTION, SUSPENSION INTRA-ARTICULAR; INTRALESIONAL; INTRAMUSCULAR; SOFT TISSUE ONCE
Status: COMPLETED | OUTPATIENT
Start: 2025-03-11 | End: 2025-03-11

## 2025-03-11 RX ADMIN — METHYLPREDNISOLONE ACETATE 80 MG: 40 INJECTION, SUSPENSION INTRA-ARTICULAR; INTRALESIONAL; INTRAMUSCULAR; SOFT TISSUE at 14:08

## 2025-03-11 NOTE — PROGRESS NOTES
Name: Bindu Garduno  YOB: 1960  Gender: female  MRN: [id number]    CC:   Chief Complaint   Patient presents with    Injections     Bilateral knee cortisone injections       HPI:  Patient returns today for osteoarthritis of bilateral  knee. We have been treating this conservatively with modification of activities, knee exercise program and periodic cortisone injections. They have noted improvement in symptoms of knee pain with cortisone. It has been at least 3-4 months since the last injection(s). They would like to have reinjection of bilateral  knee(s) as it did provide good relief and they are beginning to have recurrence of their symptoms.  She was previously referred to bariatric clinic for weight loss, and recently had gastric bypass surgery.   She has been working very hard on this and has made dietary changes as well as starting water aerobics. She has done very well with this and has lost more weight since her last office visit. Her BMI is now 46    ROS:  As per HPI. Pertinent postives and negatives are addressed with the patient, particularly those related to musculoskeletal concerns.  Non-orthopaedic concerns were referred back to the primary care physician.      Physical exam:  Patient is pleasant and in no acute distress  No obvious bony deformity to the knee(s).  Good range of motion of knee(s)  No skin discoloration  Neurovascularly intact    Impression:    Diagnosis Orders   1. Primary osteoarthritis of left knee        2. Primary osteoarthritis of right knee               Procedure Note    Patient Name: Bindu Garduno    Date of Procedure: March 11, 2025    Preoperative Diagnosis:     ICD-10-CM    1. Primary osteoarthritis of left knee  M17.12       2. Primary osteoarthritis of right knee  M17.11             Post Operative Diagnosis: same    Procedure: bilateral knees joint Injection    Consent: The patient was given the opportunity to ask questions regarding the procedure and its

## 2025-03-25 ENCOUNTER — OFFICE VISIT (OUTPATIENT)
Dept: SURGERY | Age: 65
End: 2025-03-25

## 2025-03-25 VITALS
HEART RATE: 77 BPM | SYSTOLIC BLOOD PRESSURE: 132 MMHG | BODY MASS INDEX: 45.52 KG/M2 | HEIGHT: 67 IN | WEIGHT: 290 LBS | DIASTOLIC BLOOD PRESSURE: 85 MMHG

## 2025-03-25 DIAGNOSIS — R11.0 NAUSEA: ICD-10-CM

## 2025-03-25 DIAGNOSIS — Z71.82 EXERCISE COUNSELING: ICD-10-CM

## 2025-03-25 DIAGNOSIS — E66.01 MORBID OBESITY: Primary | ICD-10-CM

## 2025-03-25 DIAGNOSIS — E66.01 OBESITY, CLASS III, BMI 40-49.9 (MORBID OBESITY): ICD-10-CM

## 2025-03-25 DIAGNOSIS — Z13.21 ENCOUNTER FOR VITAMIN DEFICIENCY SCREENING: ICD-10-CM

## 2025-03-25 DIAGNOSIS — Z98.84 S/P GASTRIC BYPASS: ICD-10-CM

## 2025-03-25 DIAGNOSIS — Z71.3 NUTRITIONAL COUNSELING: ICD-10-CM

## 2025-03-25 PROCEDURE — 99024 POSTOP FOLLOW-UP VISIT: CPT | Performed by: PHYSICIAN ASSISTANT

## 2025-03-25 RX ORDER — ONDANSETRON 4 MG/1
4 TABLET, FILM COATED ORAL 3 TIMES DAILY PRN
Qty: 30 TABLET | Refills: 2 | Status: SHIPPED | OUTPATIENT
Start: 2025-03-25

## 2025-03-25 NOTE — PROGRESS NOTES
[facility] NUTRITION REASSESSMENT    Assessment:  Pt is 6 weeks s/p RYGB.    Pt is doing well with soft diet compliance.    Intake of ~50g protein/d and ~64oz fluid/d.   Pt is eating turkey and protein shakes; and drinking water and protein shakes.    Pt is exercising via chair exercises.   Pt reports diarrhea.     Intervention:   Discussed food choices and meal ideas on regular diet, once released by surgeon.   Encouraged pt to follow mindful eating behaviors, lean protein focus followed by non-starchy vegetables as able.  Encouraged pt use protein supplements throughout the day as snacks rather than as meal replacement.    Encouraged continued and increased activity.      Monitoring and Evaluation:  Monitor for continued safe, supervised weight loss for RYGB.     Monitor pt for meeting protein requirements of 60-80g/d, 64+ fl oz fluids.  Follow for tolerance of regular diet.   F/U per MD Karen Hernández MBA. RD, LD

## 2025-03-25 NOTE — PROGRESS NOTES
Lydia Smith PA-C  Comprehensive Medical & Surgical Weight Loss      Date of visit: 3/25/2025          Name: Bindu Garduno      MRN: [id number]       : 1960       Sex: female  PCP: Allison Loja MD     Surgeon: Dr. Lizbeth Zurita  Procedure: robotic assisted gastric bypass     Surgeon: Daniel   DOS: 2025   Procedure: Bypass   Pre-op weight: 324   Ideal body weight: 150   Excess body weight: 174      Weight History Graph  Last Surgical Weight Loss:      3/25/2025    10:15 AM 3/10/2025     2:35 PM 2025    10:17 AM 2025     1:23 PM 2025     5:00 AM   Surgical Weight Loss Tracker   Date     2024   Height     5' 5\"   Initial Weight     323 lb   Initial BMI     53.74   Ideal Body Weight     150 lb   Surgery Date     2025   Pre-Surgical Weight     324 lb   Pre Surgery BMI     53.91   Date 3/25/2025 3/10/2025 2025 2025    Weight 290 lb 299 lb 302 lb 313 lb    BMI  46.82 47.29 49.02    Wt Change Since Last Visit -9 lb -3 lb -11 lb -11 lb    Total Wt Change Since Surgery -34 lb -25 lb -22 lb -11 lb    % EBWL <UNK> 14% 13% 6%       Subjective   6 weeks post-op visit after a robotic assisted gastric bypass was done on 2025.   She has lost 9lbs since her last office visit.    Clinical Assessment:    She is doing very well today and is feeling good.  She reports that she has been adhering to the soft diet without difficulty.  She denies any abdominal pain, nausea or vomiting or heartburn.  She denies fevers or chills, or any redness or drainage from the incision sites.  She does not report having problems with constipation but does continue to experience diarrhea.  Her pain is well controlled and is not taking pain medications.  She has been taking the PPI without difficulty.  She has been doing her protein shakes without difficulty, and is mainly utilizing protein shakes to meet her protein intake.  She is scheduled to meet with Dr. Calderon on

## 2025-03-26 ENCOUNTER — HOSPITAL ENCOUNTER (OUTPATIENT)
Dept: PET IMAGING | Age: 65
Discharge: HOME | End: 2025-03-29
Payer: MEDICARE

## 2025-03-26 DIAGNOSIS — C45.0: ICD-10-CM

## 2025-03-26 LAB
GLUCOSE BLD STRIP.AUTO-MCNC: 90 MG/DL (ref 65–100)
SERVICE CMNT-IMP: NORMAL

## 2025-03-26 PROCEDURE — 78815 PET IMAGE W/CT SKULL-THIGH: CPT

## 2025-03-26 PROCEDURE — A9609 HC RX DIAGNOSTIC RADIOPHARMACEUTICAL: HCPCS | Performed by: INTERNAL MEDICINE

## 2025-03-26 PROCEDURE — 3430000000 HC RX DIAGNOSTIC RADIOPHARMACEUTICAL: Performed by: INTERNAL MEDICINE

## 2025-03-26 PROCEDURE — 2500000003 HC RX 250 WO HCPCS: Performed by: INTERNAL MEDICINE

## 2025-03-26 PROCEDURE — 6360000004 HC RX CONTRAST MEDICATION: Performed by: INTERNAL MEDICINE

## 2025-03-26 PROCEDURE — 82962 GLUCOSE BLOOD TEST: CPT

## 2025-03-26 RX ORDER — FLUDEOXYGLUCOSE F 18 200 MCI/ML
15.7 INJECTION, SOLUTION INTRAVENOUS
Status: COMPLETED | OUTPATIENT
Start: 2025-03-26 | End: 2025-03-26

## 2025-03-26 RX ORDER — SODIUM CHLORIDE 0.9 % (FLUSH) 0.9 %
20 SYRINGE (ML) INJECTION AS NEEDED
Status: DISCONTINUED | OUTPATIENT
Start: 2025-03-26 | End: 2025-03-30 | Stop reason: HOSPADM

## 2025-03-26 RX ORDER — DIATRIZOATE MEGLUMINE AND DIATRIZOATE SODIUM 660; 100 MG/ML; MG/ML
10 SOLUTION ORAL; RECTAL
Status: DISCONTINUED | OUTPATIENT
Start: 2025-03-26 | End: 2025-03-30 | Stop reason: HOSPADM

## 2025-03-26 RX ADMIN — SODIUM CHLORIDE, PRESERVATIVE FREE 20 ML: 5 INJECTION INTRAVENOUS at 07:50

## 2025-03-26 RX ADMIN — FLUDEOXYGLUCOSE F 18 15.7 MILLICURIE: 200 INJECTION, SOLUTION INTRAVENOUS at 07:50

## 2025-03-26 RX ADMIN — DIATRIZOATE MEGLUMINE AND DIATRIZOATE SODIUM 10 ML: 660; 100 LIQUID ORAL; RECTAL at 07:50

## 2025-03-28 ENCOUNTER — CLINICAL DOCUMENTATION (OUTPATIENT)
Dept: CASE MANAGEMENT | Age: 65
End: 2025-03-28

## 2025-03-28 NOTE — PROGRESS NOTES
Nurse Navigation outreach related to imaging    Other PET scan on 3/36; results pending    Goal of next outreach: follow up on PET results and MedOnc recommendation  Time Spent: 5 minutes

## 2025-04-01 ENCOUNTER — OFFICE VISIT (OUTPATIENT)
Dept: SURGERY | Age: 65
End: 2025-04-01
Payer: MEDICARE

## 2025-04-01 VITALS — BODY MASS INDEX: 45.83 KG/M2 | WEIGHT: 292 LBS | HEIGHT: 67 IN

## 2025-04-01 DIAGNOSIS — C45.0: Primary | ICD-10-CM

## 2025-04-01 PROCEDURE — 99214 OFFICE O/P EST MOD 30 MIN: CPT | Performed by: SURGERY

## 2025-04-01 PROCEDURE — 1036F TOBACCO NON-USER: CPT | Performed by: SURGERY

## 2025-04-01 PROCEDURE — G8427 DOCREV CUR MEDS BY ELIG CLIN: HCPCS | Performed by: SURGERY

## 2025-04-01 PROCEDURE — 3017F COLORECTAL CA SCREEN DOC REV: CPT | Performed by: SURGERY

## 2025-04-01 PROCEDURE — G8417 CALC BMI ABV UP PARAM F/U: HCPCS | Performed by: SURGERY

## 2025-04-02 NOTE — PROGRESS NOTES
the gastric pouch and the jejunum using a hot scissors. A posterior gastroenterotomy was created by firing a Sure Form blue load stapler. Two 2-0 Vicryl sutures were placed on each side of the gastrojejunostomy and they were used to close the common enterotomy in two layers. We then created a window in the mesentery of the small bowel and divided the mihaela limb near the gastrojejunostomy to avoid candy cane syndrome using white loaded Sure Form stapler. Next, the Mihaela limb was measured to approximately 120 cm and a stapled jejunojejunostomy created with the biliopancreatic limb using a white loaded Sure Form. The common enterostomy was closed with a double layer of running 2-0 Vicryl sutures. The resulting mesenteric defect was closed with a running 2-0 Silk suture.     Holding distal obstruction with a clamp, an EGD was advanced and a leak test perfomed with inflation while holding the gastrojejunostomy under sterile saline. Neither a leak nor obstruction were observed. Normal stomach and jejunal mucosa were observed without staple line bleeding. Next, the EGD was removed while suctioning. The gastrojejunostomy was observed and found to be tension free. The intraperitoneal saline was aspirated. Hemostasis was assured. All robotic instruments were removed and the robot was undocked. The liver retractor was removed under direct vision. [employer] was placed over the gastrojejunostomy and the remnant stomach staple line. The fascia of the 12 mm port sites was closed using 0-Vicryl ties and an endoclosure device. Pneumoperitoneum was evacuated and all trocars were removed. Care was taken to observe there was no bleeding at the trocar sites. The wounds were all cleansed, dried, and closed with 4-0 Monocryl subcuticular suture. Dermabond was applied over the incisions. All counts were reported as correct. The patient tolerated the procedure well and there were no immediate complications.      Disposition: the patient was

## 2025-04-22 ENCOUNTER — TELEPHONE (OUTPATIENT)
Dept: ORTHOPEDIC SURGERY | Age: 65
End: 2025-04-22

## 2025-04-22 NOTE — TELEPHONE ENCOUNTER
Left detailed VM to return call., the pt did not have sx so if handicap was given unsure if it was temporary placard from ellie or permanent somehow with cait years ago, just need to know which provider for correct letter also need confirmed mailing address or if we need to leave at  for the pt to

## 2025-04-28 RX ORDER — OMEPRAZOLE 40 MG/1
40 CAPSULE, DELAYED RELEASE ORAL
Qty: 90 CAPSULE | Refills: 0 | Status: SHIPPED | OUTPATIENT
Start: 2025-04-28

## 2025-04-29 ENCOUNTER — OFFICE VISIT (OUTPATIENT)
Dept: ONCOLOGY | Age: 65
End: 2025-04-29
Payer: MEDICARE

## 2025-04-29 VITALS
BODY MASS INDEX: 44.83 KG/M2 | OXYGEN SATURATION: 98 % | TEMPERATURE: 98.2 F | DIASTOLIC BLOOD PRESSURE: 70 MMHG | SYSTOLIC BLOOD PRESSURE: 116 MMHG | RESPIRATION RATE: 17 BRPM | HEART RATE: 90 BPM | HEIGHT: 67 IN | WEIGHT: 285.6 LBS

## 2025-04-29 DIAGNOSIS — C45.0: Primary | ICD-10-CM

## 2025-04-29 PROCEDURE — 1036F TOBACCO NON-USER: CPT | Performed by: INTERNAL MEDICINE

## 2025-04-29 PROCEDURE — 3078F DIAST BP <80 MM HG: CPT | Performed by: INTERNAL MEDICINE

## 2025-04-29 PROCEDURE — G8427 DOCREV CUR MEDS BY ELIG CLIN: HCPCS | Performed by: INTERNAL MEDICINE

## 2025-04-29 PROCEDURE — G8417 CALC BMI ABV UP PARAM F/U: HCPCS | Performed by: INTERNAL MEDICINE

## 2025-04-29 PROCEDURE — 99213 OFFICE O/P EST LOW 20 MIN: CPT | Performed by: INTERNAL MEDICINE

## 2025-04-29 PROCEDURE — 3074F SYST BP LT 130 MM HG: CPT | Performed by: INTERNAL MEDICINE

## 2025-04-29 PROCEDURE — 3017F COLORECTAL CA SCREEN DOC REV: CPT | Performed by: INTERNAL MEDICINE

## 2025-04-29 ASSESSMENT — PATIENT HEALTH QUESTIONNAIRE - PHQ9
SUM OF ALL RESPONSES TO PHQ QUESTIONS 1-9: 0
2. FEELING DOWN, DEPRESSED OR HOPELESS: NOT AT ALL
SUM OF ALL RESPONSES TO PHQ QUESTIONS 1-9: 0
1. LITTLE INTEREST OR PLEASURE IN DOING THINGS: NOT AT ALL

## 2025-04-29 NOTE — PATIENT INSTRUCTIONS
Patient Information from Today's Visit    The members of your Oncology Medical Home are listed below:    Physician Provider: Dr. Erick Trejo, Medical Oncologist  Advanced Practice Clinician: N/A  Registered Nurse: Ivon GARCIA RN  Nurse Navigator: Karina TREJO RN  Medical Assistant: Nura MARTINEZ MA  : Yolie TOLLIVER  Supportive Care Services: Drew ESPINO LM    Diagnosis (Information Sheet Provided on Day of Diagnosis): papillary mesothelial tumor peritoneum (WDPMT)    Follow Up Instructions: 1 year with CT of abdomen and pelvis one week prior to follow-up      Current Labs: N/A        Please refer to After Visit Summary or MyChart for upcoming appointment information. Please call our office for rescheduling needs at least 24 hours before your scheduled appointment time.If you have any questions regarding your upcoming schedule please reach out to your care team through [employer] or call [phone].     Please notify your assigned Nurse Navigator of any unplanned hospital admissions or Emergency Room visits within 24 hours of discharge.    -------------------------------------------------------------------------------------------------------------------  Please call our office at [phone] if you have any  of the following symptoms:   Fever of 100.5 or greater  Chills  Shortness of breath  Swelling or pain in one leg    After office hours an answering service is available and will contact a provider for emergencies or if you are experiencing any of the above symptoms.        Ivon Abraham RN

## 2025-04-29 NOTE — PROGRESS NOTES
(36.8 °C)   Resp 17   Ht 1.702 m (5' 7\")   Wt 129.5 kg (285 lb 9.6 oz)   SpO2 98%   BMI 44.73 kg/m²    Gen - appears stated age, no acute distress  HEENT - NC/AT, no scleral icterus, no cervical/occipital LAD  CV - RRR, no MRG  PULM - CTAB  Abdominal - Soft, NT/ND, no guarding, +bowel sounds  Ext - No CCE  Neuro - nonfocal    Pathology  2/11/25  FINAL PATHOLOGIC DIAGNOSIS   A:  Omentum, biopsy: Well-differentiated papillary mesothelial tumor, see comment.   Comment: There is limited tissue for evaluation; however, microscopic examination shows small branching papillae lined by a single layer of bland cuboidal cells without cytologic atypia or mitoses. Immunohistochemical stains show positive expression of calretinin (supporting mesothelial origin) and   retained BAP1 expression. Tissue was sent for CDKN2A/B (p16) deletion analysis by FISH at [facility] and the results were negative. In summary, the overall findings are most compatible with a well differentiated papillary mesothelial tumor in the submitted material. Small unifocal   lesions are generally regarded as benign, although recurrences are reported in rare cases. This interpretation is contingent on the degree to which the current specimen reflects the entire lesion. Clinical evaluation is recommended to assess whether the lesion has been completely removed   and/or adequately sampled. This is advised to assure that the maximum pathologic findings have been determined, since lesions can be heterogeneous.     Imaging  2/24/25  CT Chest  Addendum: After discussion with Dr. Sanchez, there was a suspicious biopsied peritoneal nodule with concern for papillary mesothelioma.   - The punctate calcified pleural plaque at the left lung apex shows no significant soft tissue component. There are no other areas of abnormal pleural calcification.  - There is subtle pleural thickening in 2 locations along the medial aspect of the right lung base dependently,

## 2025-04-30 ENCOUNTER — CLINICAL DOCUMENTATION (OUTPATIENT)
Dept: CASE MANAGEMENT | Age: 65
End: 2025-04-30

## 2025-04-30 NOTE — PROGRESS NOTES
Nurse Navigation outreach related to care coordination    Navigation will be changed to  as needed status due to other stable, no evidence of residual disease .  Written handoff provided to [facility] [place].  If the patient should need to be re-engaged for navigation services, please send a staff message request to the appropriate navigation pool.  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  Provided re-assurance today and discussed surveillance moving forward.    PET/CT showed no evidence of residual disease.  Previously evaluated by Dr. Calderon/surgical oncology.  No additional surgery recommended at this point.  F/u 1 year with labs.  Discuss CT a/p at that time depending on symptoms.    Suvivorship Care Plan to follow  Time Spent: 10 minutes

## 2025-05-14 ENCOUNTER — OFFICE VISIT (OUTPATIENT)
Dept: BEHAVIORAL/MENTAL HEALTH CLINIC | Facility: CLINIC | Age: 65
End: 2025-05-14
Payer: MEDICARE

## 2025-05-14 DIAGNOSIS — F33.42 MDD (MAJOR DEPRESSIVE DISORDER), RECURRENT, IN FULL REMISSION: Primary | ICD-10-CM

## 2025-05-14 DIAGNOSIS — Z63.8 FAMILY CONFLICT: ICD-10-CM

## 2025-05-14 DIAGNOSIS — Z86.59 HISTORY OF POSTTRAUMATIC STRESS DISORDER (PTSD): ICD-10-CM

## 2025-05-14 PROCEDURE — 1036F TOBACCO NON-USER: CPT | Performed by: SOCIAL WORKER

## 2025-05-14 PROCEDURE — 90837 PSYTX W PT 60 MINUTES: CPT | Performed by: SOCIAL WORKER

## 2025-05-14 SDOH — SOCIAL STABILITY - SOCIAL INSECURITY: OTHER SPECIFIED PROBLEMS RELATED TO PRIMARY SUPPORT GROUP: Z63.8

## 2025-05-14 NOTE — PROGRESS NOTES
INDIVIDUAL THERAPY NOTE  NAME: Bindu Garduno    DATE: 5/14/2025    TYPE OF SERVICE: Individual Therapy    LOCATION OF SERVICE: [facility]    DURATION:    DIAGNOSIS: :    1. MDD (major depressive disorder), recurrent, in full remission    2. History of posttraumatic stress disorder (PTSD)    3. Family conflict        New Symptoms:   [x]No  []Yes:  Progress Status:  [x]Improving []Declining []Stable []Static  Change in Goals:  [x]No  []Yes    Chief Complaint: symptoms of depression and anxiety    Plan: CBT, DBT, CPT, Humanistic/Client Centered, ACT, Seeking Safety, Psychodynamic, ERP, IFS, and Poly Vagal Theory may be used to address symptoms associated with pt diagnosis and goals.    Mental Status Exam:  Pt was appropriately dressed and groomed.  Pt was 0x4. No unusual mannerisms were noted.  No psychotic symptoms displayed by pt.  Pt was cooperative and engaged in the session.  Insight and judgment were intact.  Denied active suicidal or homicidal ideation.  Fund of knowledge and attention span are WNL.  Denies developmental or language difficulties.  Mood/Affect: even with congruent affect  Thought Process: linear and coherent    Summary of Service:  This SW met with the pt face to face in the office.  The pt updated this SW on her mental status.  Goals continued to be addressed using various therapeutic models listed above.  HW is given as deemed appropriate.  Guided questions were asked, support and active listening were provided, distorted thinking was challenged, and strengths were validated and reinforced.  Motivational interviewing questions were asked to promote change.  Other therapeutic techniques were also utilized based upon the pt's needs and issues.    Issues Addressed:       []Trauma     []Anxiety/Panic Attacks  []Depression    [x]Relational Issues/Boundaries  [x]Self Esteem    []Anger Management  [x]Self Care/Emotional Regulation/Coping Skills     []Mood

## 2025-05-14 NOTE — PSYCHOTHERAPY
INDIVIDUAL THERAPY NOTE  NAME: Bindu Garduno    Pt is doing well.  She has lost about 50 pounds now.  She has decided to stay with her  because she is afraid her housing benefits would be canceled.  She is handling this okay.  She sets limits and has her own space in the home.

## 2025-05-15 ENCOUNTER — OFFICE VISIT (OUTPATIENT)
Dept: SURGERY | Age: 65
End: 2025-05-15
Payer: MEDICARE

## 2025-05-15 VITALS
DIASTOLIC BLOOD PRESSURE: 87 MMHG | SYSTOLIC BLOOD PRESSURE: 129 MMHG | BODY MASS INDEX: 43.79 KG/M2 | HEART RATE: 95 BPM | HEIGHT: 67 IN | WEIGHT: 279 LBS

## 2025-05-15 DIAGNOSIS — E66.01 MORBID OBESITY (HCC): Primary | ICD-10-CM

## 2025-05-15 DIAGNOSIS — E66.813 OBESITY, CLASS III, BMI 40-49.9 (MORBID OBESITY) (HCC): ICD-10-CM

## 2025-05-15 DIAGNOSIS — Z71.82 EXERCISE COUNSELING: ICD-10-CM

## 2025-05-15 DIAGNOSIS — Z71.3 NUTRITIONAL COUNSELING: ICD-10-CM

## 2025-05-15 DIAGNOSIS — Z98.84 S/P GASTRIC BYPASS: ICD-10-CM

## 2025-05-15 DIAGNOSIS — R11.11 VOMITING WITHOUT NAUSEA, UNSPECIFIED VOMITING TYPE: ICD-10-CM

## 2025-05-15 PROCEDURE — 1036F TOBACCO NON-USER: CPT | Performed by: PHYSICIAN ASSISTANT

## 2025-05-15 PROCEDURE — 3017F COLORECTAL CA SCREEN DOC REV: CPT | Performed by: PHYSICIAN ASSISTANT

## 2025-05-15 PROCEDURE — G8427 DOCREV CUR MEDS BY ELIG CLIN: HCPCS | Performed by: PHYSICIAN ASSISTANT

## 2025-05-15 PROCEDURE — G8417 CALC BMI ABV UP PARAM F/U: HCPCS | Performed by: PHYSICIAN ASSISTANT

## 2025-05-15 PROCEDURE — 3079F DIAST BP 80-89 MM HG: CPT | Performed by: PHYSICIAN ASSISTANT

## 2025-05-15 PROCEDURE — 3074F SYST BP LT 130 MM HG: CPT | Performed by: PHYSICIAN ASSISTANT

## 2025-05-15 PROCEDURE — 99215 OFFICE O/P EST HI 40 MIN: CPT | Performed by: PHYSICIAN ASSISTANT

## 2025-05-15 NOTE — PROGRESS NOTES
Lydia Smith PA-C  Comprehensive Medical & Surgical Weight Loss    Name: Bindu Garduno      MRN: [id number]       : 1960       Sex: female  PCP: Allison Loja MD     Surgeon: Dr. Lizbeth Zurita  Procedure: robotic assisted gastric bypass     Surgeon: Daniel   DOS: 2025   Procedure: Bypass   Pre-op weight: 324   Ideal body weight: 150   Excess body weight: 174      Weight History Graph  Last Surgical Weight Loss:      5/15/2025    12:53 PM 3/25/2025    10:15 AM 3/10/2025     2:35 PM 2025    10:17 AM 2025     1:23 PM 2025     5:00 AM   Surgical Weight Loss Tracker   Date 2025   Height 5' 5\"     5' 5\"   Initial Weight 323 lb     323 lb   Initial BMI 53.7     53.74   Ideal Body Weight 150 lb     150 lb   Initial Excess Body Weight (EBW) 173 lb        Surgery Date 2025   Pre-Surgical Weight 324 lb     324 lb   Pre Surgery BMI 53.9     53.91   Preop Weight Change -1 lb        Preop % Weight Change 0%        Pre-Surgical EBW 10 lb 14 oz        Date 5/15/2025 3/25/2025 3/10/2025 2025 2025    Weight 279 lb 290 lb 299 lb 302 lb 313 lb    BMI 46.4  46.82 47.29 49.02    Wt Change Since Last Visit -11 lb -9 lb -3 lb -11 lb -11 lb    Total Wt Change Since Surgery -45 lb -34 lb -25 lb -22 lb -11 lb    % EBWL 26% <UNK> 14% 13% 6%    % Total Body Wt Loss 14%           Subjective   3 months post-op visit after a robotic assisted gastric bypass was done on 2025.   She has lost 11 lbs since her last office visit.    Clinical Assessment:    She is doing very well today and is feeling good.  She reports that she has been adhering to a regular diet without difficulty.  She admits to occasional vomiting when over eating.  She denies any abdominal pain, nausea or heartburn.  She denies fevers, chills, or any problems with the incision sites.  She does not report having problems with constipation.  She is happy with her progress

## 2025-05-15 NOTE — PROGRESS NOTES
[facility] NUTRITION REASSESSMENT  Assessment:   3 months post-op RYGB. Pt consuming ~50g protein/d and ~64oz fluid/d. Pt is eating at least 3x/d.PT is drinking water and protein shakes. Pt is exercising via walker 3x weekly. Pt is taking MVI and Ca supplements as recommended.    Diet Recall:   Breakfast: Eggs and toast   Lunch: Popcorn   Dinner: Chx and rice   Snack: Greens and popcorn     Intervention:   Evaluated diet recall and identified modifications.  Encouraged slight increase in lean protein   Encouraged continued and increased activity- as able.        Monitoring and Evaluation:  Monitor for continued safe, supervised weight loss for RYGB.   F/U per MD Karen Hernández MBA. RD, LD

## 2025-05-29 ENCOUNTER — TELEPHONE (OUTPATIENT)
Dept: SURGERY | Age: 65
End: 2025-05-29

## 2025-05-29 NOTE — TELEPHONE ENCOUNTER
Returned patients call, she states she becomes very tired after she eats.  We discussed what she was eating and she may not be getting enough protein and fluids in.  After speaking with her I spoke with Karen about her concerns, she will give her a call tomorrow.

## 2025-05-30 ENCOUNTER — TELEPHONE (OUTPATIENT)
Dept: SURGERY | Age: 65
End: 2025-05-30

## 2025-05-30 DIAGNOSIS — Z71.3 NUTRITIONAL COUNSELING: Primary | ICD-10-CM

## 2025-05-30 NOTE — TELEPHONE ENCOUNTER
Pt called due to low energy and feeling nauseous with certain foods (could not remember which foods). Pt reports getting full after a few bites - encouraged pt to eat more than 3 x daily in order to meet calorie and protein goals as pt is still only 3 months after sx (encouraged pt to eat every few hours). Encouraged pt to start paying attention to what foods she is eating before the nausea starts in order to see if the nausea is caused by certain foods. Pt verbalized understanding.

## 2025-06-12 ENCOUNTER — OFFICE VISIT (OUTPATIENT)
Dept: ORTHOPEDIC SURGERY | Age: 65
End: 2025-06-12
Payer: MEDICARE

## 2025-06-12 DIAGNOSIS — M25.511 BILATERAL SHOULDER PAIN, UNSPECIFIED CHRONICITY: Primary | ICD-10-CM

## 2025-06-12 DIAGNOSIS — M25.512 BILATERAL SHOULDER PAIN, UNSPECIFIED CHRONICITY: Primary | ICD-10-CM

## 2025-06-12 PROCEDURE — 99203 OFFICE O/P NEW LOW 30 MIN: CPT | Performed by: STUDENT IN AN ORGANIZED HEALTH CARE EDUCATION/TRAINING PROGRAM

## 2025-06-12 NOTE — PROGRESS NOTES
Encounter    XR SHOULDER BILATERAL STANDARD     AP/GRASHEY/AXILLARY     Standing Status:   Future     Number of Occurrences:   1     Expected Date:   6/12/2025     Expiration Date:   6/11/2026     Scheduling Instructions:      AP/GRASHEY/AXILLARY     Reason for exam::   [place] Shoulder B        Today we discussed imaging and examination findings, diagnosis, treatment, and expected prognosis. The patient expressed understanding and agreed with the plan.    Follow up: Return if symptoms worsen or fail to improve.     Rui Daily MD, [facility]  [facility]    The patient (or guardian, if applicable) and other individuals in attendance with the patient were advised that Artificial Intelligence will be utilized during this visit to record, process the conversation to generate a clinical note, and support improvement of the AI technology. The patient (or guardian, if applicable) and other individuals in attendance at the appointment consented to the use of AI, including the recording.

## 2025-07-24 ENCOUNTER — OFFICE VISIT (OUTPATIENT)
Dept: ORTHOPEDIC SURGERY | Age: 65
End: 2025-07-24

## 2025-07-24 VITALS — BODY MASS INDEX: 41.35 KG/M2 | WEIGHT: 264 LBS

## 2025-07-24 DIAGNOSIS — M17.12 PRIMARY OSTEOARTHRITIS OF LEFT KNEE: Primary | ICD-10-CM

## 2025-07-24 DIAGNOSIS — M17.11 PRIMARY OSTEOARTHRITIS OF RIGHT KNEE: ICD-10-CM

## 2025-07-24 RX ORDER — METHYLPREDNISOLONE ACETATE 40 MG/ML
80 INJECTION, SUSPENSION INTRA-ARTICULAR; INTRALESIONAL; INTRAMUSCULAR; SOFT TISSUE ONCE
Status: COMPLETED | OUTPATIENT
Start: 2025-07-24 | End: 2025-07-24

## 2025-07-24 RX ADMIN — METHYLPREDNISOLONE ACETATE 80 MG: 40 INJECTION, SUSPENSION INTRA-ARTICULAR; INTRALESIONAL; INTRAMUSCULAR; SOFT TISSUE at 14:31

## 2025-07-24 RX ADMIN — METHYLPREDNISOLONE ACETATE 80 MG: 40 INJECTION, SUSPENSION INTRA-ARTICULAR; INTRALESIONAL; INTRAMUSCULAR; SOFT TISSUE at 14:32

## 2025-07-24 NOTE — PROGRESS NOTES
Name: Bindu Garduno  YOB: 1960  Gender: female  MRN: [id number]    CC:   Chief Complaint   Patient presents with    Injections     Kartik knee cortisone       HPI:  Patient returns today for osteoarthritis of bilateral  knee. We have been treating this conservatively with modification of activities, knee exercise program and periodic cortisone injections. They have noted improvement in symptoms of knee pain with cortisone. It has been at least 3-4 months since the last injection(s). They would like to have reinjection of bilateral  knee(s) as it did provide good relief and they are beginning to have recurrence of their symptoms.  She was previously referred to bariatric clinic for weight loss, and recently had gastric bypass surgery.   She has been working very hard on this and has made dietary changes as well as starting water aerobics. She has done very well with this and has lost more weight since her last office visit. Her BMI is now 46    ROS:  As per HPI. Pertinent postives and negatives are addressed with the patient, particularly those related to musculoskeletal concerns.  Non-orthopaedic concerns were referred back to the primary care physician.      Physical exam:  Patient is pleasant and in no acute distress  No obvious bony deformity to the knee(s).  Good range of motion of knee(s)  No skin discoloration  Neurovascularly intact    Impression:    Diagnosis Orders   1. Primary osteoarthritis of left knee  DRAIN/INJECT LARGE JOINT/BURSA    methylPREDNISolone acetate (DEPO-MEDROL) injection 80 mg      2. Primary osteoarthritis of right knee  DRAIN/INJECT LARGE JOINT/BURSA    methylPREDNISolone acetate (DEPO-MEDROL) injection 80 mg        XRAY:  AP, lateral, sunrise, and 45 degree  views of the bilateral knees are reviewed.    There is advanced joint space loss with bone-on-bone articulation, eburnated bone, and osteophyte formation present.  X-ray impression:  Advanced osteoarthritis of

## 2025-08-12 ENCOUNTER — OFFICE VISIT (OUTPATIENT)
Dept: SURGERY | Age: 65
End: 2025-08-12
Payer: MEDICARE

## 2025-08-12 VITALS
SYSTOLIC BLOOD PRESSURE: 136 MMHG | HEIGHT: 67 IN | BODY MASS INDEX: 39.24 KG/M2 | WEIGHT: 250 LBS | HEART RATE: 116 BPM | DIASTOLIC BLOOD PRESSURE: 91 MMHG

## 2025-08-12 DIAGNOSIS — Z71.3 NUTRITIONAL COUNSELING: ICD-10-CM

## 2025-08-12 DIAGNOSIS — Z71.82 EXERCISE COUNSELING: ICD-10-CM

## 2025-08-12 DIAGNOSIS — Z98.84 S/P GASTRIC BYPASS: ICD-10-CM

## 2025-08-12 DIAGNOSIS — E66.812 OBESITY, CLASS II, BMI 35-39.9: ICD-10-CM

## 2025-08-12 DIAGNOSIS — E66.01 MORBID OBESITY (HCC): Primary | ICD-10-CM

## 2025-08-12 PROCEDURE — 1036F TOBACCO NON-USER: CPT | Performed by: PHYSICIAN ASSISTANT

## 2025-08-12 PROCEDURE — 3075F SYST BP GE 130 - 139MM HG: CPT | Performed by: PHYSICIAN ASSISTANT

## 2025-08-12 PROCEDURE — 3017F COLORECTAL CA SCREEN DOC REV: CPT | Performed by: PHYSICIAN ASSISTANT

## 2025-08-12 PROCEDURE — 99215 OFFICE O/P EST HI 40 MIN: CPT | Performed by: PHYSICIAN ASSISTANT

## 2025-08-12 PROCEDURE — 3080F DIAST BP >= 90 MM HG: CPT | Performed by: PHYSICIAN ASSISTANT

## 2025-08-12 PROCEDURE — G8417 CALC BMI ABV UP PARAM F/U: HCPCS | Performed by: PHYSICIAN ASSISTANT

## 2025-08-12 PROCEDURE — G8427 DOCREV CUR MEDS BY ELIG CLIN: HCPCS | Performed by: PHYSICIAN ASSISTANT

## 2025-08-12 RX ORDER — AMLODIPINE BESYLATE 5 MG/1
TABLET ORAL
COMMUNITY
Start: 2025-08-08

## (undated) DEVICE — REDUCER: Brand: ENDOWRIST

## (undated) DEVICE — SYR 5ML 1/5 GRAD LL NSAF LF --

## (undated) DEVICE — SUCTION IRRIGATOR: Brand: ENDOWRIST

## (undated) DEVICE — VALVE SUCTION AIR H2O SET ORCA POD + DISP

## (undated) DEVICE — STAPLER 60: Brand: SUREFORM

## (undated) DEVICE — DRAPE TWL SURG 16X26IN BLU ORB04] ALLCARE INC]

## (undated) DEVICE — MARKER SURG SKIN UTIL BLK REG TIP NONSMEARING W/ 6IN RUL

## (undated) DEVICE — BW-412T DISP COMBO CLEANING BRUSH: Brand: SINGLE USE COMBINATION CLEANING BRUSH

## (undated) DEVICE — PAD PT POS 36 IN SURGYPAD DISP

## (undated) DEVICE — BINDER ABD M/L H12IN FOR 46-62IN WHT 4 SLD PNL DSGN HOOP

## (undated) DEVICE — STAPLER 60 RELOAD BLUE: Brand: SUREFORM

## (undated) DEVICE — SYR 3ML LL TIP 1/10ML GRAD --

## (undated) DEVICE — SINGLE BASIN: Brand: CARDINAL HEALTH

## (undated) DEVICE — GLOVE SURG SZ 7 L12IN FNGR THK79MIL GRN LTX FREE

## (undated) DEVICE — BLADELESS OBTURATOR: Brand: WECK VISTA

## (undated) DEVICE — SEAL

## (undated) DEVICE — SUTURE ETHIBOND EXCEL SZ 0 L30IN NONABSORBABLE GRN L26MM SH X834H

## (undated) DEVICE — SUTURE MONOCRYL SZ 4-0 L27IN ABSRB UD L19MM PS-2 1/2 CIR PRIM Y426H

## (undated) DEVICE — SUTURE VICRYL SZ 2-0 L27IN ABSRB VLT L26MM SH 1/2 CIR J317H

## (undated) DEVICE — CONNECTOR TBNG OD5-7MM O2 END DISP

## (undated) DEVICE — GLOVE SURG SZ 65 THK91MIL LTX FREE SYN POLYISOPRENE

## (undated) DEVICE — LIQUIBAND RAPID ADHESIVE 36/CS 0.8ML: Brand: MEDLINE

## (undated) DEVICE — CANNULA NSL ORAL AD FOR CAPNOFLEX CO2 O2 AIRLFE

## (undated) DEVICE — VESSEL SEALER EXTEND: Brand: ENDOWRIST

## (undated) DEVICE — TUBING, SUCTION, 3/16" X 6', STRAIGHT: Brand: MEDLINE

## (undated) DEVICE — SUTURE VICRYL + SZ 0 L27IN ABSRB VLT L26MM UR-6 5/8 CIR VCP603H

## (undated) DEVICE — CONNECTOR TBNG WHT PLAS SUCT STR 5IN1 LTWT W/ M CONN

## (undated) DEVICE — CANNULA NSL AD CO2 SAMP FOR DASH 3000 4000 MON LO FLO

## (undated) DEVICE — SEALANT TISS 4 CC FIBRIN VISTASEAL

## (undated) DEVICE — NDL PRT INJ NSAF BLNT 18GX1.5 --

## (undated) DEVICE — STAPLER 60 RELOAD WHITE: Brand: SUREFORM

## (undated) DEVICE — SYRINGE MED 30ML STD CLR PLAS LUERLOCK TIP N CTRL DISP

## (undated) DEVICE — ARM DRAPE

## (undated) DEVICE — SYRINGE MED 10ML LUERLOCK TIP W/O SFTY DISP

## (undated) DEVICE — SOLUTION IRRIG 1000ML STRL H2O USP PLAS POUR BTL

## (undated) DEVICE — GENERAL ROBOTIC: Brand: MEDLINE INDUSTRIES, INC.

## (undated) DEVICE — APPLICATOR LAP 35 CM 2 RIGID VISTASEAL

## (undated) DEVICE — COLUMN DRAPE

## (undated) DEVICE — BLOCK BITE 60FR W/ DENT RIM SCRIP ONLY

## (undated) DEVICE — APPLICATOR MEDICATED 26 CC SOLUTION HI LT ORNG CHLORAPREP

## (undated) DEVICE — SUTURE STRATAFIX SYMMETRIC SZ 1 L18IN ABSRB VLT CT1 L36CM SXPP1A404

## (undated) DEVICE — GAUZE,SPONGE,4"X4",12PLY,WOVEN,NS,LF: Brand: MEDLINE

## (undated) DEVICE — TIP COVER ACCESSORY

## (undated) DEVICE — KENDALL RADIOLUCENT FOAM MONITORING ELECTRODE RECTANGULAR SHAPE: Brand: KENDALL

## (undated) DEVICE — SUTURE PERMA-HAND SZ 2-0 L30IN NONABSORBABLE BLK L26MM SH K833H

## (undated) DEVICE — GARMENT,MEDLINE,DVT,INT,CALF,LG, GEN2: Brand: MEDLINE

## (undated) DEVICE — AIR/WATER CLEANING ADAPTER FOR OLYMPUS® GI ENDOSCOPE: Brand: BULLDOG®